# Patient Record
Sex: MALE | Race: WHITE | NOT HISPANIC OR LATINO | URBAN - METROPOLITAN AREA
[De-identification: names, ages, dates, MRNs, and addresses within clinical notes are randomized per-mention and may not be internally consistent; named-entity substitution may affect disease eponyms.]

---

## 2017-01-03 ENCOUNTER — AMBULATORY - HEALTHEAST (OUTPATIENT)
Dept: CARDIOLOGY | Facility: CLINIC | Age: 82
End: 2017-01-03

## 2017-01-03 DIAGNOSIS — Z95.0 PACEMAKER: ICD-10-CM

## 2017-01-05 ENCOUNTER — AMBULATORY - HEALTHEAST (OUTPATIENT)
Dept: NURSING | Facility: CLINIC | Age: 82
End: 2017-01-05

## 2017-01-13 ENCOUNTER — COMMUNICATION - HEALTHEAST (OUTPATIENT)
Dept: NURSING | Facility: CLINIC | Age: 82
End: 2017-01-13

## 2017-01-13 ENCOUNTER — AMBULATORY - HEALTHEAST (OUTPATIENT)
Dept: LAB | Facility: CLINIC | Age: 82
End: 2017-01-13

## 2017-01-13 DIAGNOSIS — I48.92 ATRIAL FLUTTER, UNSPECIFIED TYPE (H): ICD-10-CM

## 2017-01-13 DIAGNOSIS — I48.0 PAROXYSMAL ATRIAL FIBRILLATION (H): ICD-10-CM

## 2017-01-30 ENCOUNTER — OFFICE VISIT - HEALTHEAST (OUTPATIENT)
Dept: FAMILY MEDICINE | Facility: CLINIC | Age: 82
End: 2017-01-30

## 2017-01-30 DIAGNOSIS — J06.9 URI (UPPER RESPIRATORY INFECTION): ICD-10-CM

## 2017-02-08 ENCOUNTER — COMMUNICATION - HEALTHEAST (OUTPATIENT)
Dept: FAMILY MEDICINE | Facility: CLINIC | Age: 82
End: 2017-02-08

## 2017-02-08 DIAGNOSIS — G47.00 INSOMNIA: ICD-10-CM

## 2017-03-08 ENCOUNTER — COMMUNICATION - HEALTHEAST (OUTPATIENT)
Dept: FAMILY MEDICINE | Facility: CLINIC | Age: 82
End: 2017-03-08

## 2017-03-08 DIAGNOSIS — R52 PAIN: ICD-10-CM

## 2017-03-27 ENCOUNTER — COMMUNICATION - HEALTHEAST (OUTPATIENT)
Dept: FAMILY MEDICINE | Facility: CLINIC | Age: 82
End: 2017-03-27

## 2017-03-27 DIAGNOSIS — G47.00 INSOMNIA: ICD-10-CM

## 2017-04-12 ENCOUNTER — COMMUNICATION - HEALTHEAST (OUTPATIENT)
Dept: FAMILY MEDICINE | Facility: CLINIC | Age: 82
End: 2017-04-12

## 2017-04-12 DIAGNOSIS — I48.0 PAROXYSMAL ATRIAL FIBRILLATION (H): ICD-10-CM

## 2017-05-08 ENCOUNTER — AMBULATORY - HEALTHEAST (OUTPATIENT)
Dept: CARDIOLOGY | Facility: CLINIC | Age: 82
End: 2017-05-08

## 2017-05-08 DIAGNOSIS — Z95.0 CARDIAC PACEMAKER IN SITU: ICD-10-CM

## 2017-05-10 LAB — HCC DEVICE COMMENTS: NORMAL

## 2017-05-18 ENCOUNTER — OFFICE VISIT - HEALTHEAST (OUTPATIENT)
Dept: FAMILY MEDICINE | Facility: CLINIC | Age: 82
End: 2017-05-18

## 2017-05-18 DIAGNOSIS — R05.9 COUGH: ICD-10-CM

## 2017-05-18 ASSESSMENT — MIFFLIN-ST. JEOR: SCORE: 1427.89

## 2017-06-02 ENCOUNTER — OFFICE VISIT - HEALTHEAST (OUTPATIENT)
Dept: FAMILY MEDICINE | Facility: CLINIC | Age: 82
End: 2017-06-02

## 2017-06-02 DIAGNOSIS — R47.1 DYSARTHRIA: ICD-10-CM

## 2017-06-02 DIAGNOSIS — K21.00 REFLUX ESOPHAGITIS: ICD-10-CM

## 2017-06-02 DIAGNOSIS — Z00.00 ROUTINE GENERAL MEDICAL EXAMINATION AT A HEALTH CARE FACILITY: ICD-10-CM

## 2017-06-02 DIAGNOSIS — I50.22 CHRONIC SYSTOLIC CONGESTIVE HEART FAILURE (H): ICD-10-CM

## 2017-06-02 DIAGNOSIS — I25.10 CORONARY ATHEROSCLEROSIS: ICD-10-CM

## 2017-06-02 DIAGNOSIS — F33.9 MAJOR DEPRESSIVE DISORDER, RECURRENT EPISODE (H): ICD-10-CM

## 2017-06-02 ASSESSMENT — MIFFLIN-ST. JEOR: SCORE: 1401.57

## 2017-06-05 ENCOUNTER — COMMUNICATION - HEALTHEAST (OUTPATIENT)
Dept: FAMILY MEDICINE | Facility: CLINIC | Age: 82
End: 2017-06-05

## 2017-06-07 ENCOUNTER — COMMUNICATION - HEALTHEAST (OUTPATIENT)
Dept: NURSING | Facility: CLINIC | Age: 82
End: 2017-06-07

## 2017-06-07 ENCOUNTER — AMBULATORY - HEALTHEAST (OUTPATIENT)
Dept: LAB | Facility: CLINIC | Age: 82
End: 2017-06-07

## 2017-06-07 DIAGNOSIS — I48.0 PAROXYSMAL ATRIAL FIBRILLATION (H): ICD-10-CM

## 2017-06-07 DIAGNOSIS — I48.92 ATRIAL FLUTTER, UNSPECIFIED TYPE (H): ICD-10-CM

## 2017-07-06 ENCOUNTER — AMBULATORY - HEALTHEAST (OUTPATIENT)
Dept: LAB | Facility: CLINIC | Age: 82
End: 2017-07-06

## 2017-07-06 ENCOUNTER — COMMUNICATION - HEALTHEAST (OUTPATIENT)
Dept: NURSING | Facility: CLINIC | Age: 82
End: 2017-07-06

## 2017-07-06 DIAGNOSIS — I48.0 PAROXYSMAL ATRIAL FIBRILLATION (H): ICD-10-CM

## 2017-07-06 DIAGNOSIS — I48.92 ATRIAL FLUTTER, UNSPECIFIED TYPE (H): ICD-10-CM

## 2017-07-31 ENCOUNTER — AMBULATORY - HEALTHEAST (OUTPATIENT)
Dept: LAB | Facility: CLINIC | Age: 82
End: 2017-07-31

## 2017-07-31 ENCOUNTER — COMMUNICATION - HEALTHEAST (OUTPATIENT)
Dept: INTERNAL MEDICINE | Facility: CLINIC | Age: 82
End: 2017-07-31

## 2017-07-31 DIAGNOSIS — I48.0 PAROXYSMAL ATRIAL FIBRILLATION (H): ICD-10-CM

## 2017-07-31 DIAGNOSIS — I48.92 ATRIAL FLUTTER, UNSPECIFIED TYPE (H): ICD-10-CM

## 2017-08-10 ENCOUNTER — COMMUNICATION - HEALTHEAST (OUTPATIENT)
Dept: FAMILY MEDICINE | Facility: CLINIC | Age: 82
End: 2017-08-10

## 2017-08-10 DIAGNOSIS — I48.0 PAROXYSMAL ATRIAL FIBRILLATION (H): ICD-10-CM

## 2017-08-15 ENCOUNTER — AMBULATORY - HEALTHEAST (OUTPATIENT)
Dept: CARDIOLOGY | Facility: CLINIC | Age: 82
End: 2017-08-15

## 2017-08-15 DIAGNOSIS — Z95.0 PACEMAKER: ICD-10-CM

## 2017-08-15 LAB — HCC DEVICE COMMENTS: NORMAL

## 2017-08-31 ENCOUNTER — COMMUNICATION - HEALTHEAST (OUTPATIENT)
Dept: FAMILY MEDICINE | Facility: CLINIC | Age: 82
End: 2017-08-31

## 2017-08-31 ENCOUNTER — AMBULATORY - HEALTHEAST (OUTPATIENT)
Dept: LAB | Facility: CLINIC | Age: 82
End: 2017-08-31

## 2017-08-31 DIAGNOSIS — I48.0 PAROXYSMAL ATRIAL FIBRILLATION (H): ICD-10-CM

## 2017-08-31 DIAGNOSIS — I48.92 ATRIAL FLUTTER, UNSPECIFIED TYPE (H): ICD-10-CM

## 2017-09-08 ENCOUNTER — COMMUNICATION - HEALTHEAST (OUTPATIENT)
Dept: FAMILY MEDICINE | Facility: CLINIC | Age: 82
End: 2017-09-08

## 2017-09-08 DIAGNOSIS — G47.00 INSOMNIA: ICD-10-CM

## 2017-09-18 ENCOUNTER — COMMUNICATION - HEALTHEAST (OUTPATIENT)
Dept: NURSING | Facility: CLINIC | Age: 82
End: 2017-09-18

## 2017-09-18 DIAGNOSIS — I48.91 ATRIAL FIBRILLATION (H): ICD-10-CM

## 2017-09-19 ENCOUNTER — COMMUNICATION - HEALTHEAST (OUTPATIENT)
Dept: FAMILY MEDICINE | Facility: CLINIC | Age: 82
End: 2017-09-19

## 2017-09-21 ENCOUNTER — COMMUNICATION - HEALTHEAST (OUTPATIENT)
Dept: NURSING | Facility: CLINIC | Age: 82
End: 2017-09-21

## 2017-09-21 ENCOUNTER — OFFICE VISIT - HEALTHEAST (OUTPATIENT)
Dept: FAMILY MEDICINE | Facility: CLINIC | Age: 82
End: 2017-09-21

## 2017-09-21 DIAGNOSIS — R20.9 BILATERAL COLD FEET: ICD-10-CM

## 2017-09-21 DIAGNOSIS — I25.10 CORONARY ARTERY DISEASE: ICD-10-CM

## 2017-09-21 DIAGNOSIS — R05.9 COUGH: ICD-10-CM

## 2017-09-21 DIAGNOSIS — I48.91 ATRIAL FIBRILLATION (H): ICD-10-CM

## 2017-09-21 LAB — LDLC SERPL CALC-MCNC: 90 MG/DL

## 2017-09-21 ASSESSMENT — MIFFLIN-ST. JEOR: SCORE: 1407.47

## 2017-09-22 ENCOUNTER — COMMUNICATION - HEALTHEAST (OUTPATIENT)
Dept: FAMILY MEDICINE | Facility: CLINIC | Age: 82
End: 2017-09-22

## 2017-09-28 ENCOUNTER — COMMUNICATION - HEALTHEAST (OUTPATIENT)
Dept: FAMILY MEDICINE | Facility: CLINIC | Age: 82
End: 2017-09-28

## 2017-10-12 ENCOUNTER — AMBULATORY - HEALTHEAST (OUTPATIENT)
Dept: CARDIOLOGY | Facility: CLINIC | Age: 82
End: 2017-10-12

## 2017-10-12 ENCOUNTER — OFFICE VISIT - HEALTHEAST (OUTPATIENT)
Dept: CARDIOLOGY | Facility: CLINIC | Age: 82
End: 2017-10-12

## 2017-10-12 DIAGNOSIS — I48.0 PAROXYSMAL ATRIAL FIBRILLATION (H): ICD-10-CM

## 2017-10-12 DIAGNOSIS — I49.5 SICK SINUS SYNDROME WITH TACHYCARDIA (H): ICD-10-CM

## 2017-10-12 DIAGNOSIS — Z95.0 CARDIAC PACEMAKER IN SITU: ICD-10-CM

## 2017-10-12 DIAGNOSIS — I25.10 CORONARY ATHEROSCLEROSIS: ICD-10-CM

## 2017-10-12 DIAGNOSIS — I10 ESSENTIAL HYPERTENSION: ICD-10-CM

## 2017-10-12 DIAGNOSIS — I50.22 CHRONIC SYSTOLIC CONGESTIVE HEART FAILURE (H): ICD-10-CM

## 2017-10-12 LAB — HCC DEVICE COMMENTS: NORMAL

## 2017-10-12 ASSESSMENT — MIFFLIN-ST. JEOR: SCORE: 1431.28

## 2017-10-18 ENCOUNTER — RECORDS - HEALTHEAST (OUTPATIENT)
Dept: ADMINISTRATIVE | Facility: OTHER | Age: 82
End: 2017-10-18

## 2017-10-18 ENCOUNTER — AMBULATORY - HEALTHEAST (OUTPATIENT)
Dept: CARDIOLOGY | Facility: CLINIC | Age: 82
End: 2017-10-18

## 2017-10-26 ENCOUNTER — OFFICE VISIT - HEALTHEAST (OUTPATIENT)
Dept: FAMILY MEDICINE | Facility: CLINIC | Age: 82
End: 2017-10-26

## 2017-10-26 ENCOUNTER — COMMUNICATION - HEALTHEAST (OUTPATIENT)
Dept: FAMILY MEDICINE | Facility: CLINIC | Age: 82
End: 2017-10-26

## 2017-10-26 ENCOUNTER — COMMUNICATION - HEALTHEAST (OUTPATIENT)
Dept: CARDIOLOGY | Facility: CLINIC | Age: 82
End: 2017-10-26

## 2017-10-26 ENCOUNTER — COMMUNICATION - HEALTHEAST (OUTPATIENT)
Dept: INTERNAL MEDICINE | Facility: CLINIC | Age: 82
End: 2017-10-26

## 2017-10-26 DIAGNOSIS — I48.91 A-FIB (H): ICD-10-CM

## 2017-10-26 DIAGNOSIS — I48.91 ATRIAL FIBRILLATION (H): ICD-10-CM

## 2017-10-26 ASSESSMENT — MIFFLIN-ST. JEOR: SCORE: 1431.28

## 2018-01-16 ENCOUNTER — AMBULATORY - HEALTHEAST (OUTPATIENT)
Dept: CARDIOLOGY | Facility: CLINIC | Age: 83
End: 2018-01-16

## 2018-01-16 DIAGNOSIS — Z95.0 CARDIAC PACEMAKER IN SITU: ICD-10-CM

## 2018-01-16 LAB — HCC DEVICE COMMENTS: NORMAL

## 2018-03-01 ENCOUNTER — COMMUNICATION - HEALTHEAST (OUTPATIENT)
Dept: FAMILY MEDICINE | Facility: CLINIC | Age: 83
End: 2018-03-01

## 2018-03-01 DIAGNOSIS — G47.00 INSOMNIA: ICD-10-CM

## 2018-04-10 ENCOUNTER — COMMUNICATION - HEALTHEAST (OUTPATIENT)
Dept: FAMILY MEDICINE | Facility: CLINIC | Age: 83
End: 2018-04-10

## 2018-04-12 ENCOUNTER — COMMUNICATION - HEALTHEAST (OUTPATIENT)
Dept: FAMILY MEDICINE | Facility: CLINIC | Age: 83
End: 2018-04-12

## 2018-04-12 DIAGNOSIS — R52 PAIN: ICD-10-CM

## 2018-04-19 ENCOUNTER — COMMUNICATION - HEALTHEAST (OUTPATIENT)
Dept: CARDIOLOGY | Facility: CLINIC | Age: 83
End: 2018-04-19

## 2018-04-19 DIAGNOSIS — I48.91 A-FIB (H): ICD-10-CM

## 2018-05-01 ENCOUNTER — AMBULATORY - HEALTHEAST (OUTPATIENT)
Dept: CARDIOLOGY | Facility: CLINIC | Age: 83
End: 2018-05-01

## 2018-05-01 DIAGNOSIS — Z95.0 CARDIAC PACEMAKER IN SITU: ICD-10-CM

## 2018-05-01 LAB
HCC DEVICE COMMENTS: NORMAL
HCC DEVICE IMPLANTING PROVIDER: NORMAL
HCC DEVICE MANUFACTURE: NORMAL
HCC DEVICE MODEL: NORMAL
HCC DEVICE SERIAL NUMBER: NORMAL
HCC DEVICE TYPE: NORMAL

## 2018-05-01 ASSESSMENT — MIFFLIN-ST. JEOR: SCORE: 1444.89

## 2018-05-25 ENCOUNTER — COMMUNICATION - HEALTHEAST (OUTPATIENT)
Dept: FAMILY MEDICINE | Facility: CLINIC | Age: 83
End: 2018-05-25

## 2018-05-25 DIAGNOSIS — G47.00 INSOMNIA: ICD-10-CM

## 2018-06-07 ENCOUNTER — RECORDS - HEALTHEAST (OUTPATIENT)
Dept: ADMINISTRATIVE | Facility: OTHER | Age: 83
End: 2018-06-07

## 2018-06-11 ENCOUNTER — OFFICE VISIT - HEALTHEAST (OUTPATIENT)
Dept: FAMILY MEDICINE | Facility: CLINIC | Age: 83
End: 2018-06-11

## 2018-06-11 DIAGNOSIS — R47.1 DYSARTHRIA: ICD-10-CM

## 2018-06-11 DIAGNOSIS — I25.5 ISCHEMIC CARDIOMYOPATHY: ICD-10-CM

## 2018-06-11 DIAGNOSIS — Z95.0 CARDIAC PACEMAKER IN SITU: ICD-10-CM

## 2018-06-11 DIAGNOSIS — Z00.00 MEDICARE ANNUAL WELLNESS VISIT, SUBSEQUENT: ICD-10-CM

## 2018-06-11 DIAGNOSIS — I25.10 CORONARY ATHEROSCLEROSIS: ICD-10-CM

## 2018-06-11 DIAGNOSIS — R52 PAIN: ICD-10-CM

## 2018-06-11 DIAGNOSIS — I48.92 ATRIAL FLUTTER, UNSPECIFIED TYPE (H): ICD-10-CM

## 2018-06-11 DIAGNOSIS — G62.9 NEUROPATHY: ICD-10-CM

## 2018-06-11 DIAGNOSIS — I10 ESSENTIAL HYPERTENSION: ICD-10-CM

## 2018-06-11 ASSESSMENT — MIFFLIN-ST. JEOR: SCORE: 1412.01

## 2018-06-15 ENCOUNTER — COMMUNICATION - HEALTHEAST (OUTPATIENT)
Dept: FAMILY MEDICINE | Facility: CLINIC | Age: 83
End: 2018-06-15

## 2018-06-22 ENCOUNTER — AMBULATORY - HEALTHEAST (OUTPATIENT)
Dept: CARDIOLOGY | Facility: CLINIC | Age: 83
End: 2018-06-22

## 2018-06-25 ENCOUNTER — OFFICE VISIT - HEALTHEAST (OUTPATIENT)
Dept: CARDIOLOGY | Facility: CLINIC | Age: 83
End: 2018-06-25

## 2018-06-25 DIAGNOSIS — Z95.0 CARDIAC PACEMAKER IN SITU: ICD-10-CM

## 2018-06-25 DIAGNOSIS — I10 ESSENTIAL HYPERTENSION: ICD-10-CM

## 2018-06-25 DIAGNOSIS — I25.5 ISCHEMIC CARDIOMYOPATHY: ICD-10-CM

## 2018-06-25 DIAGNOSIS — I48.92 ATRIAL FLUTTER (H): ICD-10-CM

## 2018-06-25 DIAGNOSIS — I50.22 CHRONIC SYSTOLIC CONGESTIVE HEART FAILURE (H): ICD-10-CM

## 2018-06-25 LAB
ANION GAP SERPL CALCULATED.3IONS-SCNC: 7 MMOL/L (ref 5–18)
BNP SERPL-MCNC: 196 PG/ML (ref 0–93)
BUN SERPL-MCNC: 20 MG/DL (ref 8–28)
CALCIUM SERPL-MCNC: 9.2 MG/DL (ref 8.5–10.5)
CHLORIDE BLD-SCNC: 95 MMOL/L (ref 98–107)
CO2 SERPL-SCNC: 31 MMOL/L (ref 22–31)
CREAT SERPL-MCNC: 0.85 MG/DL (ref 0.7–1.3)
GFR SERPL CREATININE-BSD FRML MDRD: >60 ML/MIN/1.73M2
GLUCOSE BLD-MCNC: 103 MG/DL (ref 70–125)
POTASSIUM BLD-SCNC: 4.4 MMOL/L (ref 3.5–5)
SODIUM SERPL-SCNC: 133 MMOL/L (ref 136–145)

## 2018-06-25 ASSESSMENT — MIFFLIN-ST. JEOR: SCORE: 1402.94

## 2018-07-09 ENCOUNTER — AMBULATORY - HEALTHEAST (OUTPATIENT)
Dept: CARDIOLOGY | Facility: CLINIC | Age: 83
End: 2018-07-09

## 2018-07-09 DIAGNOSIS — I50.23 ACUTE ON CHRONIC SYSTOLIC CONGESTIVE HEART FAILURE (H): ICD-10-CM

## 2018-07-25 ENCOUNTER — AMBULATORY - HEALTHEAST (OUTPATIENT)
Dept: NURSING | Facility: CLINIC | Age: 83
End: 2018-07-25

## 2018-07-25 DIAGNOSIS — Z23 IMMUNIZATION DUE: ICD-10-CM

## 2018-07-30 ENCOUNTER — AMBULATORY - HEALTHEAST (OUTPATIENT)
Dept: CARDIOLOGY | Facility: CLINIC | Age: 83
End: 2018-07-30

## 2018-07-30 DIAGNOSIS — I50.23 ACUTE ON CHRONIC SYSTOLIC CONGESTIVE HEART FAILURE (H): ICD-10-CM

## 2018-07-30 LAB
ANION GAP SERPL CALCULATED.3IONS-SCNC: 6 MMOL/L (ref 5–18)
BUN SERPL-MCNC: 17 MG/DL (ref 8–28)
CALCIUM SERPL-MCNC: 9 MG/DL (ref 8.5–10.5)
CHLORIDE BLD-SCNC: 95 MMOL/L (ref 98–107)
CO2 SERPL-SCNC: 32 MMOL/L (ref 22–31)
CREAT SERPL-MCNC: 0.84 MG/DL (ref 0.7–1.3)
GFR SERPL CREATININE-BSD FRML MDRD: >60 ML/MIN/1.73M2
GLUCOSE BLD-MCNC: 95 MG/DL (ref 70–125)
POTASSIUM BLD-SCNC: 4.4 MMOL/L (ref 3.5–5)
SODIUM SERPL-SCNC: 133 MMOL/L (ref 136–145)

## 2018-08-01 ENCOUNTER — AMBULATORY - HEALTHEAST (OUTPATIENT)
Dept: CARDIOLOGY | Facility: CLINIC | Age: 83
End: 2018-08-01

## 2018-08-01 ENCOUNTER — COMMUNICATION - HEALTHEAST (OUTPATIENT)
Dept: CARDIOLOGY | Facility: CLINIC | Age: 83
End: 2018-08-01

## 2018-08-01 DIAGNOSIS — Z95.0 CARDIAC PACEMAKER IN SITU: ICD-10-CM

## 2018-08-08 ENCOUNTER — COMMUNICATION - HEALTHEAST (OUTPATIENT)
Dept: CARDIOLOGY | Facility: CLINIC | Age: 83
End: 2018-08-08

## 2018-08-08 DIAGNOSIS — I10 HYPERTENSION: ICD-10-CM

## 2018-08-13 ENCOUNTER — COMMUNICATION - HEALTHEAST (OUTPATIENT)
Dept: CARDIOLOGY | Facility: CLINIC | Age: 83
End: 2018-08-13

## 2018-10-04 ENCOUNTER — AMBULATORY - HEALTHEAST (OUTPATIENT)
Dept: CARDIOLOGY | Facility: CLINIC | Age: 83
End: 2018-10-04

## 2018-10-04 DIAGNOSIS — Z95.0 CARDIAC PACEMAKER IN SITU: ICD-10-CM

## 2018-10-04 ASSESSMENT — MIFFLIN-ST. JEOR: SCORE: 1436.96

## 2018-10-10 ENCOUNTER — COMMUNICATION - HEALTHEAST (OUTPATIENT)
Dept: CARDIOLOGY | Facility: CLINIC | Age: 83
End: 2018-10-10

## 2018-10-10 DIAGNOSIS — I48.91 A-FIB (H): ICD-10-CM

## 2018-10-18 ENCOUNTER — OFFICE VISIT - HEALTHEAST (OUTPATIENT)
Dept: CARDIOLOGY | Facility: CLINIC | Age: 83
End: 2018-10-18

## 2018-10-18 DIAGNOSIS — I10 ESSENTIAL HYPERTENSION: ICD-10-CM

## 2018-10-18 DIAGNOSIS — I48.19 PERSISTENT ATRIAL FIBRILLATION (H): ICD-10-CM

## 2018-10-18 DIAGNOSIS — I25.10 ATHEROSCLEROSIS OF NATIVE CORONARY ARTERY OF NATIVE HEART WITHOUT ANGINA PECTORIS: ICD-10-CM

## 2018-10-18 ASSESSMENT — MIFFLIN-ST. JEOR: SCORE: 1446.03

## 2018-11-14 ENCOUNTER — COMMUNICATION - HEALTHEAST (OUTPATIENT)
Dept: FAMILY MEDICINE | Facility: CLINIC | Age: 83
End: 2018-11-14

## 2018-11-14 DIAGNOSIS — G47.00 INSOMNIA: ICD-10-CM

## 2019-01-02 ENCOUNTER — COMMUNICATION - HEALTHEAST (OUTPATIENT)
Dept: FAMILY MEDICINE | Facility: CLINIC | Age: 84
End: 2019-01-02

## 2019-01-02 DIAGNOSIS — R52 PAIN: ICD-10-CM

## 2019-01-07 ENCOUNTER — AMBULATORY - HEALTHEAST (OUTPATIENT)
Dept: CARDIOLOGY | Facility: CLINIC | Age: 84
End: 2019-01-07

## 2019-01-07 DIAGNOSIS — Z95.0 CARDIAC PACEMAKER IN SITU: ICD-10-CM

## 2019-03-06 ENCOUNTER — COMMUNICATION - HEALTHEAST (OUTPATIENT)
Dept: CARDIOLOGY | Facility: CLINIC | Age: 84
End: 2019-03-06

## 2019-03-06 DIAGNOSIS — I48.91 A-FIB (H): ICD-10-CM

## 2019-03-14 ENCOUNTER — COMMUNICATION - HEALTHEAST (OUTPATIENT)
Dept: TELEHEALTH | Facility: CLINIC | Age: 84
End: 2019-03-14

## 2019-04-03 ENCOUNTER — COMMUNICATION - HEALTHEAST (OUTPATIENT)
Dept: FAMILY MEDICINE | Facility: CLINIC | Age: 84
End: 2019-04-03

## 2019-04-03 DIAGNOSIS — R52 PAIN: ICD-10-CM

## 2019-04-10 ENCOUNTER — AMBULATORY - HEALTHEAST (OUTPATIENT)
Dept: CARDIOLOGY | Facility: CLINIC | Age: 84
End: 2019-04-10

## 2019-04-10 DIAGNOSIS — Z95.0 CARDIAC PACEMAKER IN SITU: ICD-10-CM

## 2019-05-07 ENCOUNTER — OFFICE VISIT - HEALTHEAST (OUTPATIENT)
Dept: CARDIOLOGY | Facility: CLINIC | Age: 84
End: 2019-05-07

## 2019-05-07 DIAGNOSIS — I48.19 PERSISTENT ATRIAL FIBRILLATION (H): ICD-10-CM

## 2019-05-07 DIAGNOSIS — I25.10 ATHEROSCLEROSIS OF NATIVE CORONARY ARTERY OF NATIVE HEART WITHOUT ANGINA PECTORIS: ICD-10-CM

## 2019-05-07 DIAGNOSIS — I10 ESSENTIAL HYPERTENSION: ICD-10-CM

## 2019-05-07 DIAGNOSIS — I49.5 SICK SINUS SYNDROME WITH TACHYCARDIA (H): ICD-10-CM

## 2019-05-07 DIAGNOSIS — Z95.0 CARDIAC PACEMAKER IN SITU: ICD-10-CM

## 2019-05-07 DIAGNOSIS — I25.10 CAD (CORONARY ARTERY DISEASE): ICD-10-CM

## 2019-05-07 ASSESSMENT — MIFFLIN-ST. JEOR: SCORE: 1433.61

## 2019-05-16 ENCOUNTER — COMMUNICATION - HEALTHEAST (OUTPATIENT)
Dept: FAMILY MEDICINE | Facility: CLINIC | Age: 84
End: 2019-05-16

## 2019-05-16 DIAGNOSIS — G47.00 INSOMNIA: ICD-10-CM

## 2019-06-14 ENCOUNTER — OFFICE VISIT - HEALTHEAST (OUTPATIENT)
Dept: FAMILY MEDICINE | Facility: CLINIC | Age: 84
End: 2019-06-14

## 2019-06-14 DIAGNOSIS — I49.5 SICK SINUS SYNDROME WITH TACHYCARDIA (H): ICD-10-CM

## 2019-06-14 DIAGNOSIS — Z00.00 MEDICARE ANNUAL WELLNESS VISIT, SUBSEQUENT: ICD-10-CM

## 2019-06-14 DIAGNOSIS — I10 ESSENTIAL HYPERTENSION: ICD-10-CM

## 2019-06-14 DIAGNOSIS — R47.1 DYSARTHRIA: ICD-10-CM

## 2019-06-14 DIAGNOSIS — I25.10 ATHEROSCLEROSIS OF NATIVE CORONARY ARTERY OF NATIVE HEART WITHOUT ANGINA PECTORIS: ICD-10-CM

## 2019-06-14 DIAGNOSIS — I48.19 PERSISTENT ATRIAL FIBRILLATION (H): ICD-10-CM

## 2019-06-14 LAB
ALBUMIN SERPL-MCNC: 3.2 G/DL (ref 3.5–5)
ALP SERPL-CCNC: 119 U/L (ref 45–120)
ALT SERPL W P-5'-P-CCNC: 23 U/L (ref 0–45)
ANION GAP SERPL CALCULATED.3IONS-SCNC: 9 MMOL/L (ref 5–18)
AST SERPL W P-5'-P-CCNC: 36 U/L (ref 0–40)
BILIRUB DIRECT SERPL-MCNC: 0.4 MG/DL
BILIRUB SERPL-MCNC: 0.8 MG/DL (ref 0–1)
BUN SERPL-MCNC: 18 MG/DL (ref 8–28)
CALCIUM SERPL-MCNC: 9.4 MG/DL (ref 8.5–10.5)
CHLORIDE BLD-SCNC: 96 MMOL/L (ref 98–107)
CHOLEST SERPL-MCNC: 143 MG/DL
CO2 SERPL-SCNC: 29 MMOL/L (ref 22–31)
CREAT SERPL-MCNC: 0.86 MG/DL (ref 0.7–1.3)
ERYTHROCYTE [DISTWIDTH] IN BLOOD BY AUTOMATED COUNT: 11.9 % (ref 11–14.5)
FASTING STATUS PATIENT QL REPORTED: YES
GFR SERPL CREATININE-BSD FRML MDRD: >60 ML/MIN/1.73M2
GLUCOSE BLD-MCNC: 76 MG/DL (ref 70–125)
HCT VFR BLD AUTO: 37.4 % (ref 40–54)
HDLC SERPL-MCNC: 45 MG/DL
HGB BLD-MCNC: 12.6 G/DL (ref 14–18)
LDLC SERPL CALC-MCNC: 88 MG/DL
MCH RBC QN AUTO: 31.4 PG (ref 27–34)
MCHC RBC AUTO-ENTMCNC: 33.7 G/DL (ref 32–36)
MCV RBC AUTO: 93 FL (ref 80–100)
PLATELET # BLD AUTO: 149 THOU/UL (ref 140–440)
PMV BLD AUTO: 6.9 FL (ref 7–10)
POTASSIUM BLD-SCNC: 4.1 MMOL/L (ref 3.5–5)
PROT SERPL-MCNC: 7.4 G/DL (ref 6–8)
RBC # BLD AUTO: 4.02 MILL/UL (ref 4.4–6.2)
SODIUM SERPL-SCNC: 134 MMOL/L (ref 136–145)
TRIGL SERPL-MCNC: 52 MG/DL
WBC: 5.5 THOU/UL (ref 4–11)

## 2019-06-14 ASSESSMENT — MIFFLIN-ST. JEOR: SCORE: 1435.03

## 2019-06-19 ENCOUNTER — COMMUNICATION - HEALTHEAST (OUTPATIENT)
Dept: FAMILY MEDICINE | Facility: CLINIC | Age: 84
End: 2019-06-19

## 2019-06-26 ENCOUNTER — COMMUNICATION - HEALTHEAST (OUTPATIENT)
Dept: FAMILY MEDICINE | Facility: CLINIC | Age: 84
End: 2019-06-26

## 2019-06-26 DIAGNOSIS — R52 PAIN: ICD-10-CM

## 2019-06-27 ENCOUNTER — COMMUNICATION - HEALTHEAST (OUTPATIENT)
Dept: FAMILY MEDICINE | Facility: CLINIC | Age: 84
End: 2019-06-27

## 2019-06-27 DIAGNOSIS — R52 PAIN: ICD-10-CM

## 2019-07-15 ENCOUNTER — AMBULATORY - HEALTHEAST (OUTPATIENT)
Dept: CARDIOLOGY | Facility: CLINIC | Age: 84
End: 2019-07-15

## 2019-07-15 DIAGNOSIS — Z95.0 CARDIAC PACEMAKER IN SITU: ICD-10-CM

## 2019-07-16 ENCOUNTER — COMMUNICATION - HEALTHEAST (OUTPATIENT)
Dept: CARDIOLOGY | Facility: CLINIC | Age: 84
End: 2019-07-16

## 2019-07-26 ENCOUNTER — OFFICE VISIT - HEALTHEAST (OUTPATIENT)
Dept: FAMILY MEDICINE | Facility: CLINIC | Age: 84
End: 2019-07-26

## 2019-07-26 ENCOUNTER — RECORDS - HEALTHEAST (OUTPATIENT)
Dept: GENERAL RADIOLOGY | Facility: CLINIC | Age: 84
End: 2019-07-26

## 2019-07-26 DIAGNOSIS — M25.562 ACUTE PAIN OF LEFT KNEE: ICD-10-CM

## 2019-07-26 DIAGNOSIS — M25.562 PAIN IN LEFT KNEE: ICD-10-CM

## 2019-07-31 ENCOUNTER — COMMUNICATION - HEALTHEAST (OUTPATIENT)
Dept: CARDIOLOGY | Facility: CLINIC | Age: 84
End: 2019-07-31

## 2019-07-31 DIAGNOSIS — I10 HYPERTENSION: ICD-10-CM

## 2019-08-08 ENCOUNTER — COMMUNICATION - HEALTHEAST (OUTPATIENT)
Dept: FAMILY MEDICINE | Facility: CLINIC | Age: 84
End: 2019-08-08

## 2019-08-08 DIAGNOSIS — G47.00 INSOMNIA: ICD-10-CM

## 2019-09-03 ENCOUNTER — COMMUNICATION - HEALTHEAST (OUTPATIENT)
Dept: FAMILY MEDICINE | Facility: CLINIC | Age: 84
End: 2019-09-03

## 2019-09-05 ENCOUNTER — COMMUNICATION - HEALTHEAST (OUTPATIENT)
Dept: FAMILY MEDICINE | Facility: CLINIC | Age: 84
End: 2019-09-05

## 2019-09-11 ENCOUNTER — AMBULATORY - HEALTHEAST (OUTPATIENT)
Dept: CARDIOLOGY | Facility: CLINIC | Age: 84
End: 2019-09-11

## 2019-09-11 DIAGNOSIS — I50.22 CHRONIC SYSTOLIC HEART FAILURE (H): ICD-10-CM

## 2019-09-11 DIAGNOSIS — I48.19 PERSISTENT ATRIAL FIBRILLATION (H): ICD-10-CM

## 2019-09-11 DIAGNOSIS — I25.10 CAD (CORONARY ARTERY DISEASE): ICD-10-CM

## 2019-09-11 DIAGNOSIS — I49.5 SICK SINUS SYNDROME WITH TACHYCARDIA (H): ICD-10-CM

## 2019-09-11 DIAGNOSIS — Z95.0 CARDIAC PACEMAKER IN SITU: ICD-10-CM

## 2019-09-11 DIAGNOSIS — I25.10 ATHEROSCLEROSIS OF NATIVE CORONARY ARTERY OF NATIVE HEART WITHOUT ANGINA PECTORIS: ICD-10-CM

## 2019-09-11 DIAGNOSIS — I10 ESSENTIAL HYPERTENSION: ICD-10-CM

## 2019-09-11 DIAGNOSIS — I48.4 ATYPICAL ATRIAL FLUTTER (H): ICD-10-CM

## 2019-09-11 DIAGNOSIS — I25.5 ISCHEMIC CARDIOMYOPATHY: ICD-10-CM

## 2019-09-11 LAB
ANION GAP SERPL CALCULATED.3IONS-SCNC: 6 MMOL/L (ref 5–18)
BNP SERPL-MCNC: 1075 PG/ML (ref 0–93)
BUN SERPL-MCNC: 24 MG/DL (ref 8–28)
CALCIUM SERPL-MCNC: 9.1 MG/DL (ref 8.5–10.5)
CHLORIDE BLD-SCNC: 92 MMOL/L (ref 98–107)
CO2 SERPL-SCNC: 29 MMOL/L (ref 22–31)
CREAT SERPL-MCNC: 0.89 MG/DL (ref 0.7–1.3)
GFR SERPL CREATININE-BSD FRML MDRD: >60 ML/MIN/1.73M2
GLUCOSE BLD-MCNC: 84 MG/DL (ref 70–125)
POTASSIUM BLD-SCNC: 5 MMOL/L (ref 3.5–5)
SODIUM SERPL-SCNC: 127 MMOL/L (ref 136–145)

## 2019-09-11 ASSESSMENT — MIFFLIN-ST. JEOR: SCORE: 1460.77

## 2019-09-12 ENCOUNTER — AMBULATORY - HEALTHEAST (OUTPATIENT)
Dept: CARDIOLOGY | Facility: CLINIC | Age: 84
End: 2019-09-12

## 2019-09-12 ENCOUNTER — COMMUNICATION - HEALTHEAST (OUTPATIENT)
Dept: CARDIOLOGY | Facility: CLINIC | Age: 84
End: 2019-09-12

## 2019-09-12 DIAGNOSIS — I25.10 CAD (CORONARY ARTERY DISEASE): ICD-10-CM

## 2019-09-13 ENCOUNTER — HOSPITAL ENCOUNTER (OUTPATIENT)
Dept: CARDIOLOGY | Facility: HOSPITAL | Age: 84
Discharge: HOME | End: 2019-09-13
Attending: INTERNAL MEDICINE

## 2019-09-13 ENCOUNTER — OFFICE VISIT - HEALTHEAST (OUTPATIENT)
Dept: FAMILY MEDICINE | Facility: CLINIC | Age: 84
End: 2019-09-13

## 2019-09-13 DIAGNOSIS — I50.22 CHRONIC SYSTOLIC HEART FAILURE (H): ICD-10-CM

## 2019-09-13 DIAGNOSIS — I25.5 ISCHEMIC CARDIOMYOPATHY: ICD-10-CM

## 2019-09-13 DIAGNOSIS — R09.89 THROAT CLEARING: ICD-10-CM

## 2019-09-13 ASSESSMENT — MIFFLIN-ST. JEOR: SCORE: 1452.83

## 2019-09-16 LAB
AORTIC ROOT: 3.1 CM
AORTIC VALVE MEAN VELOCITY: 95.5 CM/S
AV DIMENSIONLESS INDEX VTI: 0.8
AV MEAN GRADIENT: 4 MMHG
AV PEAK GRADIENT: 6.1 MMHG
AV VALVE AREA: 3 CM2
AV VELOCITY RATIO: 0.8
BSA FOR ECHO PROCEDURE: 1.95 M2
CV BLOOD PRESSURE: ABNORMAL MMHG
CV ECHO HEIGHT: 68.5 IN
CV ECHO WEIGHT: 174 LBS
DOP CALC AO PEAK VEL: 123 CM/S
DOP CALC AO VTI: 24.1 CM
DOP CALC LVOT AREA: 3.8 CM2
DOP CALC LVOT DIAMETER: 2.2 CM
DOP CALC LVOT PEAK VEL: 92.4 CM/S
DOP CALC LVOT STROKE VOLUME: 71.8 CM3
DOP CALCLVOT PEAK VEL VTI: 18.9 CM
EJECTION FRACTION: 51 % (ref 55–75)
FRACTIONAL SHORTENING: 57 % (ref 28–44)
INTERVENTRICULAR SEPTUM IN END DIASTOLE: 0.9 CM (ref 0.6–1)
IVS/PW RATIO: 1
LA AREA 1: 34 CM2
LA AREA 2: 34 CM2
LEFT ATRIUM LENGTH: 6.6 CM
LEFT ATRIUM SIZE: 5.2 CM
LEFT ATRIUM VOLUME INDEX: 76.3 ML/M2
LEFT ATRIUM VOLUME: 148.9 ML
LEFT VENTRICLE CARDIAC INDEX: 2.3 L/MIN/M2
LEFT VENTRICLE CARDIAC OUTPUT: 4.5 L/MIN
LEFT VENTRICLE DIASTOLIC VOLUME INDEX: 61 CM3/M2 (ref 34–74)
LEFT VENTRICLE DIASTOLIC VOLUME: 119 CM3 (ref 62–150)
LEFT VENTRICLE HEART RATE: 63 BPM
LEFT VENTRICLE MASS INDEX: 81.1 G/M2
LEFT VENTRICLE SYSTOLIC VOLUME INDEX: 30 CM3/M2 (ref 11–31)
LEFT VENTRICLE SYSTOLIC VOLUME: 58.5 CM3 (ref 21–61)
LEFT VENTRICULAR INTERNAL DIMENSION IN DIASTOLE: 5 CM (ref 4.2–5.8)
LEFT VENTRICULAR INTERNAL DIMENSION IN SYSTOLE: 2.15 CM (ref 2.5–4)
LEFT VENTRICULAR MASS: 158.2 G
LEFT VENTRICULAR OUTFLOW TRACT MEAN GRADIENT: 2 MMHG
LEFT VENTRICULAR OUTFLOW TRACT MEAN VELOCITY: 72.1 CM/S
LEFT VENTRICULAR OUTFLOW TRACT PEAK GRADIENT: 3 MMHG
LEFT VENTRICULAR POSTERIOR WALL IN END DIASTOLE: 0.9 CM (ref 0.6–1)
LV STROKE VOLUME INDEX: 36.8 ML/M2
MITRAL REGURGITANT VELOCITY TIME INTEGRAL: 137 CM
MR FLOW: 21 CM3
MR MEAN GRADIENT: 59 MMHG
MR MEAN VELOCITY: 410 CM/S
MR PEAK GRADIENT: 80.6 MMHG
MR PISA EROA: 0.2 CM2
MR PISA RADIUS: 0.6 CM
MR PISA VN NYQUIST: 30.7 CM/S
MV AVERAGE E/E' RATIO: 5.9 CM/S
MV DECELERATION TIME: 278 MS
MV E'TISSUE VEL-LAT: 15.3 CM/S
MV E'TISSUE VEL-MED: 5.22 CM/S
MV LATERAL E/E' RATIO: 3.9
MV MEDIAL E/E' RATIO: 11.5
MV PEAK E VELOCITY: 60.1 CM/S
MV REGURGITANT VOLUME: 21.2 CC
NUC REST DIASTOLIC VOLUME INDEX: 2788 LBS
NUC REST SYSTOLIC VOLUME INDEX: 68.5 IN
PISA MR PEAK VEL: 449 CM/S
TRICUSPID REGURGITATION PEAK PRESSURE GRADIENT: 37.9 MMHG
TRICUSPID VALVE PEAK REGURGITANT VELOCITY: 308 CM/S

## 2019-09-19 ENCOUNTER — AMBULATORY - HEALTHEAST (OUTPATIENT)
Dept: CARDIOLOGY | Facility: CLINIC | Age: 84
End: 2019-09-19

## 2019-09-19 ENCOUNTER — OFFICE VISIT - HEALTHEAST (OUTPATIENT)
Dept: CARDIOLOGY | Facility: CLINIC | Age: 84
End: 2019-09-19

## 2019-09-19 DIAGNOSIS — Z95.0 CARDIAC PACEMAKER IN SITU: ICD-10-CM

## 2019-09-19 DIAGNOSIS — I48.19 PERSISTENT ATRIAL FIBRILLATION (H): ICD-10-CM

## 2019-09-19 DIAGNOSIS — I50.33 ACUTE ON CHRONIC HEART FAILURE WITH PRESERVED EJECTION FRACTION (H): ICD-10-CM

## 2019-09-19 DIAGNOSIS — I25.5 ISCHEMIC CARDIOMYOPATHY: ICD-10-CM

## 2019-09-19 DIAGNOSIS — R60.9 EDEMA, UNSPECIFIED TYPE: ICD-10-CM

## 2019-09-19 LAB
ANION GAP SERPL CALCULATED.3IONS-SCNC: 6 MMOL/L (ref 5–18)
BUN SERPL-MCNC: 19 MG/DL (ref 8–28)
CALCIUM SERPL-MCNC: 9.4 MG/DL (ref 8.5–10.5)
CHLORIDE BLD-SCNC: 93 MMOL/L (ref 98–107)
CO2 SERPL-SCNC: 32 MMOL/L (ref 22–31)
CREAT SERPL-MCNC: 0.8 MG/DL (ref 0.7–1.3)
GFR SERPL CREATININE-BSD FRML MDRD: >60 ML/MIN/1.73M2
GLUCOSE BLD-MCNC: 87 MG/DL (ref 70–125)
POTASSIUM BLD-SCNC: 4.1 MMOL/L (ref 3.5–5)
SODIUM SERPL-SCNC: 131 MMOL/L (ref 136–145)

## 2019-09-19 ASSESSMENT — MIFFLIN-ST. JEOR: SCORE: 1449.99

## 2019-09-23 ENCOUNTER — HOSPITAL ENCOUNTER (OUTPATIENT)
Dept: INTERVENTIONAL RADIOLOGY/VASCULAR | Facility: HOSPITAL | Age: 84
Discharge: HOME | End: 2019-09-23
Attending: INTERNAL MEDICINE | Admitting: RADIOLOGY

## 2019-09-23 DIAGNOSIS — I25.5 ISCHEMIC CARDIOMYOPATHY: ICD-10-CM

## 2019-09-23 DIAGNOSIS — I48.19 PERSISTENT ATRIAL FIBRILLATION (H): ICD-10-CM

## 2019-09-23 DIAGNOSIS — I50.33 ACUTE ON CHRONIC HEART FAILURE WITH PRESERVED EJECTION FRACTION (H): ICD-10-CM

## 2019-09-23 DIAGNOSIS — Z95.0 CARDIAC PACEMAKER IN SITU: ICD-10-CM

## 2019-09-29 ENCOUNTER — COMMUNICATION - HEALTHEAST (OUTPATIENT)
Dept: CARDIOLOGY | Facility: CLINIC | Age: 84
End: 2019-09-29

## 2019-10-02 ENCOUNTER — COMMUNICATION - HEALTHEAST (OUTPATIENT)
Dept: CARDIOLOGY | Facility: CLINIC | Age: 84
End: 2019-10-02

## 2019-10-02 DIAGNOSIS — I48.91 A-FIB (H): ICD-10-CM

## 2019-10-09 ENCOUNTER — SURGERY - HEALTHEAST (OUTPATIENT)
Dept: CARDIOLOGY | Facility: CLINIC | Age: 84
End: 2019-10-09

## 2019-10-09 ENCOUNTER — OFFICE VISIT - HEALTHEAST (OUTPATIENT)
Dept: CARDIOLOGY | Facility: CLINIC | Age: 84
End: 2019-10-09

## 2019-10-09 ENCOUNTER — AMBULATORY - HEALTHEAST (OUTPATIENT)
Dept: CARDIOLOGY | Facility: CLINIC | Age: 84
End: 2019-10-09

## 2019-10-09 DIAGNOSIS — I25.5 ISCHEMIC CARDIOMYOPATHY: ICD-10-CM

## 2019-10-09 DIAGNOSIS — I50.9 HEART FAILURE (H): ICD-10-CM

## 2019-10-09 DIAGNOSIS — I50.32 CHRONIC HEART FAILURE WITH PRESERVED EJECTION FRACTION (H): ICD-10-CM

## 2019-10-09 DIAGNOSIS — I49.5 SICK SINUS SYNDROME WITH TACHYCARDIA (H): ICD-10-CM

## 2019-10-09 DIAGNOSIS — Z95.0 CARDIAC PACEMAKER IN SITU: ICD-10-CM

## 2019-10-09 DIAGNOSIS — I48.91 ATRIAL FIBRILLATION (H): ICD-10-CM

## 2019-10-09 LAB
ANION GAP SERPL CALCULATED.3IONS-SCNC: 7 MMOL/L (ref 5–18)
BUN SERPL-MCNC: 22 MG/DL (ref 8–28)
CALCIUM SERPL-MCNC: 8.9 MG/DL (ref 8.5–10.5)
CHLORIDE BLD-SCNC: 95 MMOL/L (ref 98–107)
CO2 SERPL-SCNC: 33 MMOL/L (ref 22–31)
CREAT SERPL-MCNC: 0.88 MG/DL (ref 0.7–1.3)
GFR SERPL CREATININE-BSD FRML MDRD: >60 ML/MIN/1.73M2
GLUCOSE BLD-MCNC: 106 MG/DL (ref 70–125)
POTASSIUM BLD-SCNC: 4.3 MMOL/L (ref 3.5–5)
SODIUM SERPL-SCNC: 135 MMOL/L (ref 136–145)

## 2019-10-09 ASSESSMENT — MIFFLIN-ST. JEOR: SCORE: 1404.63

## 2019-10-10 ENCOUNTER — COMMUNICATION - HEALTHEAST (OUTPATIENT)
Dept: CARDIOLOGY | Facility: CLINIC | Age: 84
End: 2019-10-10

## 2019-10-18 ENCOUNTER — COMMUNICATION - HEALTHEAST (OUTPATIENT)
Dept: FAMILY MEDICINE | Facility: CLINIC | Age: 84
End: 2019-10-18

## 2019-10-18 DIAGNOSIS — R52 PAIN: ICD-10-CM

## 2019-10-23 ENCOUNTER — AMBULATORY - HEALTHEAST (OUTPATIENT)
Dept: CARDIOLOGY | Facility: CLINIC | Age: 84
End: 2019-10-23

## 2019-10-23 DIAGNOSIS — I25.10 CAD (CORONARY ARTERY DISEASE): ICD-10-CM

## 2019-10-23 DIAGNOSIS — Z95.0 CARDIAC PACEMAKER IN SITU: ICD-10-CM

## 2019-10-30 ENCOUNTER — AMBULATORY - HEALTHEAST (OUTPATIENT)
Dept: NURSING | Facility: CLINIC | Age: 84
End: 2019-10-30

## 2019-10-30 DIAGNOSIS — Z23 FLU VACCINE NEED: ICD-10-CM

## 2019-11-20 ENCOUNTER — COMMUNICATION - HEALTHEAST (OUTPATIENT)
Dept: CARDIOLOGY | Facility: CLINIC | Age: 84
End: 2019-11-20

## 2019-11-21 ENCOUNTER — OFFICE VISIT - HEALTHEAST (OUTPATIENT)
Dept: CARDIOLOGY | Facility: CLINIC | Age: 84
End: 2019-11-21

## 2019-11-21 DIAGNOSIS — Z95.0 CARDIAC PACEMAKER IN SITU: ICD-10-CM

## 2019-11-21 DIAGNOSIS — I49.5 SICK SINUS SYNDROME WITH TACHYCARDIA (H): ICD-10-CM

## 2019-11-21 DIAGNOSIS — T82.110A PACEMAKER LEAD MALFUNCTION: ICD-10-CM

## 2019-11-21 DIAGNOSIS — I50.32 CHRONIC HEART FAILURE WITH PRESERVED EJECTION FRACTION (H): ICD-10-CM

## 2019-11-21 DIAGNOSIS — I25.5 ISCHEMIC CARDIOMYOPATHY: ICD-10-CM

## 2019-11-21 DIAGNOSIS — I48.4 ATYPICAL ATRIAL FLUTTER (H): ICD-10-CM

## 2019-12-02 ENCOUNTER — SURGERY - HEALTHEAST (OUTPATIENT)
Dept: CARDIOLOGY | Facility: CLINIC | Age: 84
End: 2019-12-02

## 2019-12-02 ASSESSMENT — MIFFLIN-ST. JEOR: SCORE: 1374.82

## 2019-12-03 ASSESSMENT — MIFFLIN-ST. JEOR: SCORE: 1353.5

## 2019-12-05 ENCOUNTER — RECORDS - HEALTHEAST (OUTPATIENT)
Dept: ADMINISTRATIVE | Facility: OTHER | Age: 84
End: 2019-12-05

## 2019-12-09 ENCOUNTER — AMBULATORY - HEALTHEAST (OUTPATIENT)
Dept: CARDIOLOGY | Facility: CLINIC | Age: 84
End: 2019-12-09

## 2019-12-09 ENCOUNTER — OFFICE VISIT - HEALTHEAST (OUTPATIENT)
Dept: CARDIOLOGY | Facility: CLINIC | Age: 84
End: 2019-12-09

## 2019-12-09 DIAGNOSIS — I25.10 ATHEROSCLEROSIS OF NATIVE CORONARY ARTERY OF NATIVE HEART WITHOUT ANGINA PECTORIS: ICD-10-CM

## 2019-12-09 DIAGNOSIS — I10 ESSENTIAL HYPERTENSION: ICD-10-CM

## 2019-12-09 DIAGNOSIS — I25.5 ISCHEMIC CARDIOMYOPATHY: ICD-10-CM

## 2019-12-09 DIAGNOSIS — Z95.0 BIVENTRICULAR CARDIAC PACEMAKER IN SITU: ICD-10-CM

## 2019-12-09 DIAGNOSIS — I48.21 PERMANENT ATRIAL FIBRILLATION (H): ICD-10-CM

## 2019-12-09 DIAGNOSIS — I49.5 SICK SINUS SYNDROME WITH TACHYCARDIA (H): ICD-10-CM

## 2019-12-09 DIAGNOSIS — I50.32 CHRONIC HEART FAILURE WITH PRESERVED EJECTION FRACTION (H): ICD-10-CM

## 2019-12-09 ASSESSMENT — MIFFLIN-ST. JEOR: SCORE: 1391.6

## 2019-12-20 ENCOUNTER — AMBULATORY - HEALTHEAST (OUTPATIENT)
Dept: CARDIOLOGY | Facility: CLINIC | Age: 84
End: 2019-12-20

## 2019-12-20 DIAGNOSIS — Z95.0 BIVENTRICULAR CARDIAC PACEMAKER IN SITU: ICD-10-CM

## 2019-12-20 DIAGNOSIS — I48.92 ATRIAL FLUTTER, UNSPECIFIED TYPE (H): ICD-10-CM

## 2020-01-01 ENCOUNTER — COMMUNICATION - HEALTHEAST (OUTPATIENT)
Dept: FAMILY MEDICINE | Facility: CLINIC | Age: 85
End: 2020-01-01

## 2020-01-01 ENCOUNTER — AMBULATORY - HEALTHEAST (OUTPATIENT)
Dept: CARDIOLOGY | Facility: CLINIC | Age: 85
End: 2020-01-01

## 2020-01-01 ENCOUNTER — RECORDS - HEALTHEAST (OUTPATIENT)
Dept: ADMINISTRATIVE | Facility: OTHER | Age: 85
End: 2020-01-01

## 2020-01-01 ENCOUNTER — COMMUNICATION - HEALTHEAST (OUTPATIENT)
Dept: CARDIOLOGY | Facility: CLINIC | Age: 85
End: 2020-01-01

## 2020-01-01 ENCOUNTER — COMMUNICATION - HEALTHEAST (OUTPATIENT)
Dept: HOME HEALTH SERVICES | Facility: HOME HEALTH | Age: 85
End: 2020-01-01

## 2020-01-01 ENCOUNTER — OFFICE VISIT - HEALTHEAST (OUTPATIENT)
Dept: CARDIOLOGY | Facility: CLINIC | Age: 85
End: 2020-01-01

## 2020-01-01 ENCOUNTER — COMMUNICATION - HEALTHEAST (OUTPATIENT)
Dept: ADMINISTRATIVE | Facility: CLINIC | Age: 85
End: 2020-01-01

## 2020-01-01 ENCOUNTER — DOCUMENTATION ONLY (OUTPATIENT)
Dept: OTHER | Facility: CLINIC | Age: 85
End: 2020-01-01

## 2020-01-01 ENCOUNTER — OFFICE VISIT - HEALTHEAST (OUTPATIENT)
Dept: FAMILY MEDICINE | Facility: CLINIC | Age: 85
End: 2020-01-01

## 2020-01-01 ENCOUNTER — AMBULATORY - HEALTHEAST (OUTPATIENT)
Dept: PULMONOLOGY | Facility: OTHER | Age: 85
End: 2020-01-01

## 2020-01-01 ENCOUNTER — HOME CARE/HOSPICE - HEALTHEAST (OUTPATIENT)
Dept: HOME HEALTH SERVICES | Facility: HOME HEALTH | Age: 85
End: 2020-01-01

## 2020-01-01 ENCOUNTER — COMMUNICATION - HEALTHEAST (OUTPATIENT)
Dept: PULMONOLOGY | Facility: OTHER | Age: 85
End: 2020-01-01

## 2020-01-01 ENCOUNTER — COMMUNICATION - HEALTHEAST (OUTPATIENT)
Dept: SCHEDULING | Facility: CLINIC | Age: 85
End: 2020-01-01

## 2020-01-01 ENCOUNTER — AMBULATORY - HEALTHEAST (OUTPATIENT)
Dept: FAMILY MEDICINE | Facility: CLINIC | Age: 85
End: 2020-01-01

## 2020-01-01 ENCOUNTER — AMBULATORY - HEALTHEAST (OUTPATIENT)
Dept: OTHER | Facility: CLINIC | Age: 85
End: 2020-01-01

## 2020-01-01 ENCOUNTER — OFFICE VISIT - HEALTHEAST (OUTPATIENT)
Dept: PHARMACY | Facility: CLINIC | Age: 85
End: 2020-01-01

## 2020-01-01 ENCOUNTER — HOSPITAL ENCOUNTER (OUTPATIENT)
Dept: CARDIOLOGY | Facility: HOSPITAL | Age: 85
Discharge: HOME | End: 2020-09-23
Attending: INTERNAL MEDICINE

## 2020-01-01 DIAGNOSIS — I50.32 CHRONIC HEART FAILURE WITH PRESERVED EJECTION FRACTION (H): ICD-10-CM

## 2020-01-01 DIAGNOSIS — I25.5 ISCHEMIC CARDIOMYOPATHY: ICD-10-CM

## 2020-01-01 DIAGNOSIS — I97.190 COMPLETE AV BLOCK DUE TO AV NODAL ABLATION (H): ICD-10-CM

## 2020-01-01 DIAGNOSIS — F33.9 EPISODE OF RECURRENT MAJOR DEPRESSIVE DISORDER, UNSPECIFIED DEPRESSION EPISODE SEVERITY (H): ICD-10-CM

## 2020-01-01 DIAGNOSIS — I44.2 COMPLETE AV BLOCK DUE TO AV NODAL ABLATION (H): ICD-10-CM

## 2020-01-01 DIAGNOSIS — I47.29 PAROXYSMAL VENTRICULAR TACHYCARDIA (H): ICD-10-CM

## 2020-01-01 DIAGNOSIS — R09.02 HYPOXEMIA: ICD-10-CM

## 2020-01-01 DIAGNOSIS — Z95.0 BIVENTRICULAR CARDIAC PACEMAKER IN SITU: ICD-10-CM

## 2020-01-01 DIAGNOSIS — G89.4 CHRONIC PAIN SYNDROME: ICD-10-CM

## 2020-01-01 DIAGNOSIS — Z00.00 MEDICARE ANNUAL WELLNESS VISIT, SUBSEQUENT: ICD-10-CM

## 2020-01-01 DIAGNOSIS — Z79.899 POLYPHARMACY: ICD-10-CM

## 2020-01-01 DIAGNOSIS — I27.20 PULMONARY HYPERTENSION (H): ICD-10-CM

## 2020-01-01 DIAGNOSIS — I48.21 PERMANENT ATRIAL FIBRILLATION (H): ICD-10-CM

## 2020-01-01 DIAGNOSIS — G47.00 INSOMNIA: ICD-10-CM

## 2020-01-01 DIAGNOSIS — I27.22 PULMONARY HYPERTENSION DUE TO LEFT HEART DISEASE (H): ICD-10-CM

## 2020-01-01 DIAGNOSIS — R40.0 DROWSINESS: ICD-10-CM

## 2020-01-01 DIAGNOSIS — I25.10 ATHEROSCLEROSIS OF NATIVE CORONARY ARTERY OF NATIVE HEART WITHOUT ANGINA PECTORIS: ICD-10-CM

## 2020-01-01 DIAGNOSIS — R23.8 FRAGILE SKIN: ICD-10-CM

## 2020-01-01 DIAGNOSIS — I48.91 A-FIB (H): ICD-10-CM

## 2020-01-01 DIAGNOSIS — G47.00 INSOMNIA, UNSPECIFIED TYPE: ICD-10-CM

## 2020-01-01 DIAGNOSIS — Z78.9 POLST (PHYSICIAN ORDERS FOR LIFE-SUSTAINING TREATMENT): ICD-10-CM

## 2020-01-01 DIAGNOSIS — L89.101 PRESSURE INJURY OF BACK, STAGE 1: ICD-10-CM

## 2020-01-01 DIAGNOSIS — F41.9 ANXIETY: ICD-10-CM

## 2020-01-01 DIAGNOSIS — R52 PAIN: ICD-10-CM

## 2020-01-01 DIAGNOSIS — J69.0 ASPIRATION PNEUMONIA, UNSPECIFIED ASPIRATION PNEUMONIA TYPE, UNSPECIFIED LATERALITY, UNSPECIFIED PART OF LUNG (H): ICD-10-CM

## 2020-01-01 DIAGNOSIS — R09.02 HYPOXIA: ICD-10-CM

## 2020-01-01 DIAGNOSIS — J43.9 PULMONARY EMPHYSEMA (H): ICD-10-CM

## 2020-01-01 DIAGNOSIS — J96.01 ACUTE RESPIRATORY FAILURE WITH HYPOXIA (H): ICD-10-CM

## 2020-01-01 DIAGNOSIS — R41.0 CONFUSION: ICD-10-CM

## 2020-01-01 DIAGNOSIS — I48.92 ATRIAL FLUTTER (H): ICD-10-CM

## 2020-01-01 DIAGNOSIS — J43.9 PULMONARY EMPHYSEMA, UNSPECIFIED EMPHYSEMA TYPE (H): ICD-10-CM

## 2020-01-01 DIAGNOSIS — I10 HYPERTENSION: ICD-10-CM

## 2020-01-01 DIAGNOSIS — R68.89 CLINICAL DECOMPENSATION: ICD-10-CM

## 2020-01-01 DIAGNOSIS — I49.5 SICK SINUS SYNDROME WITH TACHYCARDIA (H): ICD-10-CM

## 2020-01-01 DIAGNOSIS — Z23 ENCOUNTER FOR IMMUNIZATION: ICD-10-CM

## 2020-01-01 DIAGNOSIS — I48.0 PAROXYSMAL ATRIAL FIBRILLATION (H): ICD-10-CM

## 2020-01-01 DIAGNOSIS — Z78.9 PATIENT HAS ACTIVE PHYSICIAN ORDERS FOR LIFE-SUSTAINING TREATMENT (POLST) FORM: ICD-10-CM

## 2020-01-01 DIAGNOSIS — I10 ESSENTIAL HYPERTENSION: ICD-10-CM

## 2020-01-01 LAB
ALBUMIN SERPL-MCNC: 3.5 G/DL (ref 3.5–5)
ALP SERPL-CCNC: 172 U/L (ref 45–120)
ALT SERPL W P-5'-P-CCNC: 26 U/L (ref 0–45)
ANION GAP SERPL CALCULATED.3IONS-SCNC: 11 MMOL/L (ref 5–18)
ANION GAP SERPL CALCULATED.3IONS-SCNC: 8 MMOL/L (ref 5–18)
AORTIC ROOT: 3.1 CM
AORTIC VALVE MEAN VELOCITY: 73.1 CM/S
AR DECEL SLOPE: 1720 MM/S2
AR PEAK VELOCITY: 329 CM/S
ASCENDING AORTA: 3.1 CM
AST SERPL W P-5'-P-CCNC: 37 U/L (ref 0–40)
AV DIMENSIONLESS INDEX VTI: 0.8
AV MEAN GRADIENT: 2 MMHG
AV PEAK GRADIENT: 3.7 MMHG
AV REGURGITANT PEAK GRADIENT: 43.3 MMHG
AV REGURGITATION PRESSURE HALF TIME: 560 MS
AV VALVE AREA: 2.5 CM2
BILIRUB DIRECT SERPL-MCNC: 0.3 MG/DL
BILIRUB SERPL-MCNC: 0.6 MG/DL (ref 0–1)
BNP SERPL-MCNC: 409 PG/ML (ref 0–93)
BSA FOR ECHO PROCEDURE: 1.84 M2
BUN SERPL-MCNC: 38 MG/DL (ref 8–28)
BUN SERPL-MCNC: 42 MG/DL (ref 8–28)
CALCIUM SERPL-MCNC: 8.8 MG/DL (ref 8.5–10.5)
CALCIUM SERPL-MCNC: 9.1 MG/DL (ref 8.5–10.5)
CHLORIDE BLD-SCNC: 92 MMOL/L (ref 98–107)
CHLORIDE BLD-SCNC: 95 MMOL/L (ref 98–107)
CO2 SERPL-SCNC: 33 MMOL/L (ref 22–31)
CO2 SERPL-SCNC: 35 MMOL/L (ref 22–31)
CREAT SERPL-MCNC: 0.85 MG/DL (ref 0.7–1.3)
CREAT SERPL-MCNC: 0.88 MG/DL (ref 0.7–1.3)
CV BLOOD PRESSURE: NORMAL MMHG
CV ECHO HEIGHT: 69 IN
CV ECHO WEIGHT: 153 LBS
DOP CALC AO PEAK VEL: 96.7 CM/S
DOP CALC AO VTI: 17.5 CM
DOP CALC LVOT AREA: 3.14 CM2
DOP CALC LVOT DIAMETER: 2 CM
DOP CALC LVOT STROKE VOLUME: 43.6 CM3
DOP CALCLVOT PEAK VEL VTI: 13.9 CM
EJECTION FRACTION: 51 % (ref 55–75)
ERYTHROCYTE [DISTWIDTH] IN BLOOD BY AUTOMATED COUNT: 14.4 % (ref 11–14.5)
FRACTIONAL SHORTENING: 26.9 % (ref 28–44)
GFR SERPL CREATININE-BSD FRML MDRD: >60 ML/MIN/1.73M2
GFR SERPL CREATININE-BSD FRML MDRD: >60 ML/MIN/1.73M2
GLUCOSE BLD-MCNC: 108 MG/DL (ref 70–125)
GLUCOSE BLD-MCNC: 79 MG/DL (ref 70–125)
HCC DEVICE COMMENTS: NORMAL
HCC DEVICE IMPLANTING PROVIDER: NORMAL
HCC DEVICE MANUFACTURE: NORMAL
HCC DEVICE MODEL: NORMAL
HCC DEVICE SERIAL NUMBER: NORMAL
HCC DEVICE TYPE: NORMAL
HCT VFR BLD AUTO: 39 % (ref 40–54)
HGB BLD-MCNC: 12.3 G/DL (ref 14–18)
INTERVENTRICULAR SEPTUM IN END DIASTOLE: 0.7 CM (ref 0.6–1)
IVS/PW RATIO: 0.7
LA AREA 1: 27.4 CM2
LA AREA 2: 30.2 CM2
LDLC SERPL CALC-MCNC: 107 MG/DL
LEFT ATRIUM LENGTH: 5.8 CM
LEFT ATRIUM SIZE: 4.6 CM
LEFT ATRIUM VOLUME INDEX: 65.9 ML/M2
LEFT ATRIUM VOLUME: 121.3 ML
LEFT VENTRICLE CARDIAC INDEX: 1.5 L/MIN/M2
LEFT VENTRICLE CARDIAC OUTPUT: 2.8 L/MIN
LEFT VENTRICLE DIASTOLIC VOLUME INDEX: 47.8 CM3/M2 (ref 34–74)
LEFT VENTRICLE DIASTOLIC VOLUME: 88 CM3 (ref 62–150)
LEFT VENTRICLE HEART RATE: 64 BPM
LEFT VENTRICLE MASS INDEX: 85.3 G/M2
LEFT VENTRICLE SYSTOLIC VOLUME INDEX: 23.4 CM3/M2 (ref 11–31)
LEFT VENTRICLE SYSTOLIC VOLUME: 43 CM3 (ref 21–61)
LEFT VENTRICULAR INTERNAL DIMENSION IN DIASTOLE: 5.2 CM (ref 4.2–5.8)
LEFT VENTRICULAR INTERNAL DIMENSION IN SYSTOLE: 3.8 CM (ref 2.5–4)
LEFT VENTRICULAR MASS: 156.9 G
LEFT VENTRICULAR OUTFLOW TRACT MEAN GRADIENT: 1 MMHG
LEFT VENTRICULAR OUTFLOW TRACT MEAN VELOCITY: 49.4 CM/S
LEFT VENTRICULAR POSTERIOR WALL IN END DIASTOLE: 1 CM (ref 0.6–1)
LV STROKE VOLUME INDEX: 23.7 ML/M2
MCH RBC QN AUTO: 28.3 PG (ref 27–34)
MCHC RBC AUTO-ENTMCNC: 31.5 G/DL (ref 32–36)
MCV RBC AUTO: 90 FL (ref 80–100)
MITRAL REGURGITANT VELOCITY TIME INTEGRAL: 133 CM
MITRAL VALVE E/A RATIO: 1.9
MR MEAN GRADIENT: 43 MMHG
MR MEAN VELOCITY: 305 CM/S
MR PEAK GRADIENT: 70.6 MMHG
MR PISA EROA: 0.1 CM2
MR PISA RADIUS: 0.5 CM
MR PISA VN NYQUIST: 24 CM/S
MV AVERAGE E/E' RATIO: 9.9 CM/S
MV DECELERATION TIME: 165 MS
MV E'TISSUE VEL-LAT: 8.77 CM/S
MV E'TISSUE VEL-MED: 3.41 CM/S
MV LATERAL E/E' RATIO: 6.9
MV MEDIAL E/E' RATIO: 17.7
MV PEAK A VELOCITY: 32.1 CM/S
MV PEAK E VELOCITY: 60.5 CM/S
MV REGURGITANT VOLUME: 11.9 CC
NUC REST DIASTOLIC VOLUME INDEX: 2448 LBS
NUC REST SYSTOLIC VOLUME INDEX: 69 IN
PISA MR PEAK VEL: 420 CM/S
PLATELET # BLD AUTO: 165 THOU/UL (ref 140–440)
PMV BLD AUTO: 7.3 FL (ref 7–10)
POTASSIUM BLD-SCNC: 3.8 MMOL/L (ref 3.5–5)
POTASSIUM BLD-SCNC: 4.2 MMOL/L (ref 3.5–5)
PROT SERPL-MCNC: 8.5 G/DL (ref 6–8)
RBC # BLD AUTO: 4.35 MILL/UL (ref 4.4–6.2)
RIGHT VENTRICULAR INTERNAL DIMENSION IN DYSTOLE: 4.7 CM
SODIUM SERPL-SCNC: 136 MMOL/L (ref 136–145)
SODIUM SERPL-SCNC: 138 MMOL/L (ref 136–145)
TRICUSPID REGURGITATION PEAK PRESSURE GRADIENT: 49 MMHG
TRICUSPID VALVE ANULAR PLANE SYSTOLIC EXCURSION: 1.3 CM
TRICUSPID VALVE PEAK REGURGITANT VELOCITY: 350 CM/S
WBC: 6 THOU/UL (ref 4–11)

## 2020-01-01 ASSESSMENT — MIFFLIN-ST. JEOR
SCORE: 1382.53
SCORE: 1364.38
SCORE: 1374.36
SCORE: 1364.38

## 2020-01-01 ASSESSMENT — PATIENT HEALTH QUESTIONNAIRE - PHQ9: SUM OF ALL RESPONSES TO PHQ QUESTIONS 1-9: 0

## 2020-01-08 ENCOUNTER — COMMUNICATION - HEALTHEAST (OUTPATIENT)
Dept: CARDIOLOGY | Facility: CLINIC | Age: 85
End: 2020-01-08

## 2020-01-08 ENCOUNTER — AMBULATORY - HEALTHEAST (OUTPATIENT)
Dept: CARDIOLOGY | Facility: CLINIC | Age: 85
End: 2020-01-08

## 2020-01-08 DIAGNOSIS — I49.5 SICK SINUS SYNDROME WITH TACHYCARDIA (H): ICD-10-CM

## 2020-01-08 DIAGNOSIS — Z95.0 BIVENTRICULAR CARDIAC PACEMAKER IN SITU: ICD-10-CM

## 2020-01-10 ENCOUNTER — RECORDS - HEALTHEAST (OUTPATIENT)
Dept: ADMINISTRATIVE | Facility: OTHER | Age: 85
End: 2020-01-10

## 2020-01-29 ENCOUNTER — RECORDS - HEALTHEAST (OUTPATIENT)
Dept: ADMINISTRATIVE | Facility: OTHER | Age: 85
End: 2020-01-29

## 2020-02-17 ENCOUNTER — OFFICE VISIT - HEALTHEAST (OUTPATIENT)
Dept: CARDIOLOGY | Facility: CLINIC | Age: 85
End: 2020-02-17

## 2020-02-17 DIAGNOSIS — Z95.0 BIVENTRICULAR CARDIAC PACEMAKER IN SITU: ICD-10-CM

## 2020-02-17 DIAGNOSIS — I50.32 CHRONIC HEART FAILURE WITH PRESERVED EJECTION FRACTION (H): ICD-10-CM

## 2020-02-17 ASSESSMENT — MIFFLIN-ST. JEOR: SCORE: 1446.03

## 2020-03-02 ENCOUNTER — AMBULATORY - HEALTHEAST (OUTPATIENT)
Dept: CARDIOLOGY | Facility: CLINIC | Age: 85
End: 2020-03-02

## 2020-03-02 DIAGNOSIS — Z95.0 BIVENTRICULAR CARDIAC PACEMAKER IN SITU: ICD-10-CM

## 2020-03-02 DIAGNOSIS — I97.190 COMPLETE AV BLOCK DUE TO AV NODAL ABLATION (H): ICD-10-CM

## 2020-03-02 DIAGNOSIS — I44.2 COMPLETE AV BLOCK DUE TO AV NODAL ABLATION (H): ICD-10-CM

## 2020-03-03 ENCOUNTER — COMMUNICATION - HEALTHEAST (OUTPATIENT)
Dept: CARDIOLOGY | Facility: CLINIC | Age: 85
End: 2020-03-03

## 2020-03-05 ENCOUNTER — OFFICE VISIT - HEALTHEAST (OUTPATIENT)
Dept: CARDIOLOGY | Facility: CLINIC | Age: 85
End: 2020-03-05

## 2020-03-05 DIAGNOSIS — I50.9 CHF (CONGESTIVE HEART FAILURE) (H): ICD-10-CM

## 2020-03-05 LAB
ANION GAP SERPL CALCULATED.3IONS-SCNC: 10 MMOL/L (ref 5–18)
BUN SERPL-MCNC: 35 MG/DL (ref 8–28)
CALCIUM SERPL-MCNC: 9 MG/DL (ref 8.5–10.5)
CHLORIDE BLD-SCNC: 93 MMOL/L (ref 98–107)
CO2 SERPL-SCNC: 30 MMOL/L (ref 22–31)
CREAT SERPL-MCNC: 1.2 MG/DL (ref 0.7–1.3)
GFR SERPL CREATININE-BSD FRML MDRD: 58 ML/MIN/1.73M2
GLUCOSE BLD-MCNC: 99 MG/DL (ref 70–125)
POTASSIUM BLD-SCNC: 4.6 MMOL/L (ref 3.5–5)
SODIUM SERPL-SCNC: 133 MMOL/L (ref 136–145)

## 2020-03-05 ASSESSMENT — MIFFLIN-ST. JEOR: SCORE: 1455.1

## 2020-03-09 ENCOUNTER — AMBULATORY - HEALTHEAST (OUTPATIENT)
Dept: LAB | Facility: CLINIC | Age: 85
End: 2020-03-09

## 2020-03-09 ENCOUNTER — COMMUNICATION - HEALTHEAST (OUTPATIENT)
Dept: CARDIOLOGY | Facility: CLINIC | Age: 85
End: 2020-03-09

## 2020-03-09 DIAGNOSIS — I50.32 CHRONIC HEART FAILURE WITH PRESERVED EJECTION FRACTION (H): ICD-10-CM

## 2020-03-09 DIAGNOSIS — I50.9 CHF (CONGESTIVE HEART FAILURE) (H): ICD-10-CM

## 2020-03-09 LAB
ANION GAP SERPL CALCULATED.3IONS-SCNC: 12 MMOL/L (ref 5–18)
BUN SERPL-MCNC: 32 MG/DL (ref 8–28)
CALCIUM SERPL-MCNC: 9.7 MG/DL (ref 8.5–10.5)
CHLORIDE BLD-SCNC: 84 MMOL/L (ref 98–107)
CO2 SERPL-SCNC: 38 MMOL/L (ref 22–31)
CREAT SERPL-MCNC: 1.14 MG/DL (ref 0.7–1.3)
GFR SERPL CREATININE-BSD FRML MDRD: >60 ML/MIN/1.73M2
GLUCOSE BLD-MCNC: 121 MG/DL (ref 70–125)
POTASSIUM BLD-SCNC: 3.2 MMOL/L (ref 3.5–5)
SODIUM SERPL-SCNC: 134 MMOL/L (ref 136–145)

## 2020-03-11 ENCOUNTER — COMMUNICATION - HEALTHEAST (OUTPATIENT)
Dept: CARDIOLOGY | Facility: CLINIC | Age: 85
End: 2020-03-11

## 2020-03-11 DIAGNOSIS — I50.32 CHRONIC HEART FAILURE WITH PRESERVED EJECTION FRACTION (H): ICD-10-CM

## 2020-03-12 ENCOUNTER — COMMUNICATION - HEALTHEAST (OUTPATIENT)
Dept: FAMILY MEDICINE | Facility: CLINIC | Age: 85
End: 2020-03-12

## 2020-03-12 ENCOUNTER — COMMUNICATION - HEALTHEAST (OUTPATIENT)
Dept: CARDIOLOGY | Facility: CLINIC | Age: 85
End: 2020-03-12

## 2020-03-13 ENCOUNTER — AMBULATORY - HEALTHEAST (OUTPATIENT)
Dept: LAB | Facility: CLINIC | Age: 85
End: 2020-03-13

## 2020-03-13 ENCOUNTER — COMMUNICATION - HEALTHEAST (OUTPATIENT)
Dept: FAMILY MEDICINE | Facility: CLINIC | Age: 85
End: 2020-03-13

## 2020-03-13 DIAGNOSIS — I50.32 CHRONIC HEART FAILURE WITH PRESERVED EJECTION FRACTION (H): ICD-10-CM

## 2020-03-13 DIAGNOSIS — E66.3 OVERWEIGHT: ICD-10-CM

## 2020-03-13 LAB
ALBUMIN SERPL-MCNC: 3.3 G/DL (ref 3.5–5)
ALP SERPL-CCNC: 179 U/L (ref 45–120)
ALT SERPL W P-5'-P-CCNC: 37 U/L (ref 0–45)
ANION GAP SERPL CALCULATED.3IONS-SCNC: 10 MMOL/L (ref 5–18)
AST SERPL W P-5'-P-CCNC: 48 U/L (ref 0–40)
BILIRUB SERPL-MCNC: 1 MG/DL (ref 0–1)
BUN SERPL-MCNC: 51 MG/DL (ref 8–28)
CALCIUM SERPL-MCNC: 9.6 MG/DL (ref 8.5–10.5)
CHLORIDE BLD-SCNC: 91 MMOL/L (ref 98–107)
CO2 SERPL-SCNC: 38 MMOL/L (ref 22–31)
CREAT SERPL-MCNC: 1.31 MG/DL (ref 0.7–1.3)
GFR SERPL CREATININE-BSD FRML MDRD: 52 ML/MIN/1.73M2
GLUCOSE BLD-MCNC: 151 MG/DL (ref 70–125)
POTASSIUM BLD-SCNC: 3.6 MMOL/L (ref 3.5–5)
PROT SERPL-MCNC: 8.4 G/DL (ref 6–8)
SODIUM SERPL-SCNC: 139 MMOL/L (ref 136–145)

## 2020-03-16 ENCOUNTER — COMMUNICATION - HEALTHEAST (OUTPATIENT)
Dept: CARDIOLOGY | Facility: CLINIC | Age: 85
End: 2020-03-16

## 2020-03-16 ENCOUNTER — COMMUNICATION - HEALTHEAST (OUTPATIENT)
Dept: FAMILY MEDICINE | Facility: CLINIC | Age: 85
End: 2020-03-16

## 2020-03-17 ENCOUNTER — COMMUNICATION - HEALTHEAST (OUTPATIENT)
Dept: CARDIOLOGY | Facility: CLINIC | Age: 85
End: 2020-03-17

## 2020-03-17 DIAGNOSIS — I50.32 CHRONIC HEART FAILURE WITH PRESERVED EJECTION FRACTION (H): ICD-10-CM

## 2020-03-17 DIAGNOSIS — I10 HYPERTENSION: ICD-10-CM

## 2020-03-17 DIAGNOSIS — I48.91 A-FIB (H): ICD-10-CM

## 2020-03-20 ENCOUNTER — AMBULATORY - HEALTHEAST (OUTPATIENT)
Dept: OTHER | Facility: CLINIC | Age: 85
End: 2020-03-20

## 2020-03-20 ENCOUNTER — DOCUMENTATION ONLY (OUTPATIENT)
Dept: OTHER | Facility: CLINIC | Age: 85
End: 2020-03-20

## 2020-03-27 ENCOUNTER — OFFICE VISIT - HEALTHEAST (OUTPATIENT)
Dept: FAMILY MEDICINE | Facility: CLINIC | Age: 85
End: 2020-03-27

## 2020-03-27 ENCOUNTER — COMMUNICATION - HEALTHEAST (OUTPATIENT)
Dept: CARDIOLOGY | Facility: CLINIC | Age: 85
End: 2020-03-27

## 2020-03-27 DIAGNOSIS — I10 ESSENTIAL HYPERTENSION: ICD-10-CM

## 2020-03-27 DIAGNOSIS — I50.9 CHF (CONGESTIVE HEART FAILURE) (H): ICD-10-CM

## 2020-03-27 DIAGNOSIS — I50.32 CHRONIC HEART FAILURE WITH PRESERVED EJECTION FRACTION (H): ICD-10-CM

## 2020-03-27 DIAGNOSIS — I48.21 PERMANENT ATRIAL FIBRILLATION (H): ICD-10-CM

## 2020-03-27 DIAGNOSIS — I44.2 COMPLETE AV BLOCK DUE TO AV NODAL ABLATION (H): ICD-10-CM

## 2020-03-27 DIAGNOSIS — I25.10 ATHEROSCLEROSIS OF NATIVE CORONARY ARTERY OF NATIVE HEART WITHOUT ANGINA PECTORIS: ICD-10-CM

## 2020-03-27 DIAGNOSIS — M62.81 GENERALIZED MUSCLE WEAKNESS: ICD-10-CM

## 2020-03-27 DIAGNOSIS — I97.190 COMPLETE AV BLOCK DUE TO AV NODAL ABLATION (H): ICD-10-CM

## 2020-03-30 ENCOUNTER — COMMUNICATION - HEALTHEAST (OUTPATIENT)
Dept: CARDIOLOGY | Facility: CLINIC | Age: 85
End: 2020-03-30

## 2020-03-30 DIAGNOSIS — I50.9 CHF (CONGESTIVE HEART FAILURE) (H): ICD-10-CM

## 2020-04-02 ENCOUNTER — RECORDS - HEALTHEAST (OUTPATIENT)
Dept: ADMINISTRATIVE | Facility: OTHER | Age: 85
End: 2020-04-02

## 2020-04-06 ENCOUNTER — OFFICE VISIT - HEALTHEAST (OUTPATIENT)
Dept: FAMILY MEDICINE | Facility: CLINIC | Age: 85
End: 2020-04-06

## 2020-04-06 DIAGNOSIS — K59.01 SLOW TRANSIT CONSTIPATION: ICD-10-CM

## 2020-04-21 ENCOUNTER — COMMUNICATION - HEALTHEAST (OUTPATIENT)
Dept: FAMILY MEDICINE | Facility: CLINIC | Age: 85
End: 2020-04-21

## 2020-04-21 DIAGNOSIS — I50.32 CHRONIC HEART FAILURE WITH PRESERVED EJECTION FRACTION (H): ICD-10-CM

## 2020-04-21 DIAGNOSIS — R63.0 ANOREXIA: ICD-10-CM

## 2020-04-30 ENCOUNTER — AMBULATORY - HEALTHEAST (OUTPATIENT)
Dept: SCHEDULING | Facility: CLINIC | Age: 85
End: 2020-04-30

## 2020-04-30 DIAGNOSIS — I50.9 CHRONIC HEART FAILURE, UNSPECIFIED HEART FAILURE TYPE (H): ICD-10-CM

## 2020-04-30 DIAGNOSIS — R63.0 ANOREXIA: ICD-10-CM

## 2021-01-01 ENCOUNTER — RECORDS - HEALTHEAST (OUTPATIENT)
Dept: ADMINISTRATIVE | Facility: OTHER | Age: 86
End: 2021-01-01

## 2021-01-01 ENCOUNTER — AMBULATORY - HEALTHEAST (OUTPATIENT)
Dept: NURSING | Facility: CLINIC | Age: 86
End: 2021-01-01

## 2021-01-01 ENCOUNTER — AMBULATORY - HEALTHEAST (OUTPATIENT)
Dept: CARDIOLOGY | Facility: CLINIC | Age: 86
End: 2021-01-01

## 2021-01-01 ENCOUNTER — COMMUNICATION - HEALTHEAST (OUTPATIENT)
Dept: CARDIOLOGY | Facility: CLINIC | Age: 86
End: 2021-01-01

## 2021-01-01 ENCOUNTER — COMMUNICATION - HEALTHEAST (OUTPATIENT)
Dept: FAMILY MEDICINE | Facility: CLINIC | Age: 86
End: 2021-01-01

## 2021-01-01 DIAGNOSIS — I44.2 THIRD DEGREE AV BLOCK (H): ICD-10-CM

## 2021-01-01 DIAGNOSIS — I48.91 A-FIB (H): ICD-10-CM

## 2021-01-01 DIAGNOSIS — Z95.0 BIVENTRICULAR CARDIAC PACEMAKER IN SITU: ICD-10-CM

## 2021-01-01 DIAGNOSIS — I25.5 ISCHEMIC CARDIOMYOPATHY: ICD-10-CM

## 2021-01-01 DIAGNOSIS — I49.5 SICK SINUS SYNDROME WITH TACHYCARDIA (H): ICD-10-CM

## 2021-01-01 DIAGNOSIS — I48.19 PERSISTENT ATRIAL FIBRILLATION (H): ICD-10-CM

## 2021-01-01 LAB
HCC DEVICE COMMENTS: NORMAL
HCC DEVICE COMMENTS: NORMAL
HCC DEVICE IMPLANTING PROVIDER: NORMAL
HCC DEVICE IMPLANTING PROVIDER: NORMAL
HCC DEVICE MANUFACTURE: NORMAL
HCC DEVICE MANUFACTURE: NORMAL
HCC DEVICE MODEL: NORMAL
HCC DEVICE MODEL: NORMAL
HCC DEVICE SERIAL NUMBER: NORMAL
HCC DEVICE SERIAL NUMBER: NORMAL
HCC DEVICE TYPE: NORMAL
HCC DEVICE TYPE: NORMAL

## 2021-05-13 ENCOUNTER — AMBULATORY - HEALTHEAST (OUTPATIENT)
Dept: CARDIOLOGY | Facility: CLINIC | Age: 86
End: 2021-05-13

## 2021-05-13 ENCOUNTER — COMMUNICATION - HEALTHEAST (OUTPATIENT)
Dept: CARDIOLOGY | Facility: CLINIC | Age: 86
End: 2021-05-13

## 2021-05-13 ENCOUNTER — COMMUNICATION - HEALTHEAST (OUTPATIENT)
Dept: FAMILY MEDICINE | Facility: CLINIC | Age: 86
End: 2021-05-13

## 2021-05-27 ASSESSMENT — PATIENT HEALTH QUESTIONNAIRE - PHQ9: SUM OF ALL RESPONSES TO PHQ QUESTIONS 1-9: 0

## 2021-05-27 NOTE — TELEPHONE ENCOUNTER
Refill Approved    Rx renewed per Medication Renewal Policy. Medication was last renewed on 1/3/19.    Marilee Loza, [facility] Triage/Med Refill 4/4/2019     Requested Prescriptions   Pending Prescriptions Disp Refills     gabapentin (NEURONTIN) 300 MG capsule [Pharmacy Med Name: Gabapentin Oral Capsule 300 MG] 270 capsule 0     Sig: TAKE 1 CAPSULE BY MOUTH 3 TIMES DAILY. MAY TAKE 2-3 CAPSULES BY MOUTH DAILY, DOES NOT NEED TO BE 3 SEPERATE TIMES    Gabapentin/Levetiracetam/Tiagabine Refill Protocol  Passed - 4/3/2019  9:49 AM       Passed - PCP or prescribing provider visit in past 12 months or next 3 months    Last office visit with prescriber/PCP: 9/21/2017 Timbo Auguste MD OR same dept: Visit date not found OR same specialty: 10/26/2017 Dilshad Khan MD  Last physical: 6/11/2018 Last MTM visit: Visit date not found   Next visit within 3 mo: Visit date not found  Next physical within 3 mo: Visit date not found  Prescriber OR PCP: Timbo Auguste MD  Last diagnosis associated with med order: 1. Pain  - gabapentin (NEURONTIN) 300 MG capsule [Pharmacy Med Name: Gabapentin Oral Capsule 300 MG]; TAKE 1 CAPSULE BY MOUTH 3 TIMES DAILY. MAY TAKE 2-3 CAPSULES BY MOUTH DAILY, DOES NOT NEED TO BE 3 SEPERATE TIMES  Dispense: 270 capsule; Refill: 0    If protocol passes may refill for 12 months if within 3 months of last provider visit (or a total of 15 months).

## 2021-05-28 NOTE — TELEPHONE ENCOUNTER
Due to be seen    Rx renewed per Medication Renewal Policy. Medication was last renewed on 11/16/18.    Marilee Loza, [facility] Triage/Med Refill 5/16/2019     Requested Prescriptions   Pending Prescriptions Disp Refills     mirtazapine (REMERON) 7.5 MG tablet [Pharmacy Med Name: Mirtazapine Oral Tablet 7.5 MG] 270 tablet 0     Sig: TAKE 1 TO 3 TABLETS BY MOUTH AT BEDTIME.       Tricyclics/Misc Antidepressant/Antianxiety Meds Refill Protocol Passed - 5/16/2019  9:19 AM        Passed - PCP or prescribing provider visit in last year     Last office visit with prescriber/PCP: 9/21/2017 Timbo Auguste MD OR same dept: Visit date not found OR same specialty: 10/26/2017 Dilshad Khan MD  Last physical: 6/11/2018 Last MTM visit: Visit date not found   Next visit within 3 mo: Visit date not found  Next physical within 3 mo: Visit date not found  Prescriber OR PCP: Timbo Auguste MD  Last diagnosis associated with med order: 1. Insomnia  - mirtazapine (REMERON) 7.5 MG tablet [Pharmacy Med Name: Mirtazapine Oral Tablet 7.5 MG]; TAKE 1 TO 3 TABLETS BY MOUTH AT BEDTIME  Dispense: 270 tablet; Refill: 0    If protocol passes may refill for 12 months if within 3 months of last provider visit (or a total of 15 months).

## 2021-05-29 NOTE — PROGRESS NOTES
Assessment and Plan:       1. Medicare annual wellness visit, subsequent    Reviewed the annual wellness visit health risk assessment form  Mini cog score is 4/5  PHQ-2 score is 0  Reviewed falls risk    Vaccines are up to date    2. Hypertension    Continue lisinopril and metoprolol    - Basic Metabolic Panel  - Hepatic Profile  - Lipid Cascade  - HM2(CBC w/o Differential)    3. Atherosclerosis of native coronary artery of native heart without angina pectoris  4. Persistent atrial fibrillation (H)  5. Sick sinus syndrome with tachycardia (H)    Continue plan of cardiology, Dr. Hartman    Continue furosemide  Continue lisinopril and metoprolol  Continue Eliquis    6. Dysarthria    Follow-up with [facility] as planned        The patient's current medical problems were reviewed.    I have had an Advance Directives discussion with the patient.  The following health maintenance schedule was reviewed with the patient and provided in printed form in the after visit summary:   Health Maintenance   Topic Date Due     DEPRESSION FOLLOW UP  07/17/1934     FALL RISK ASSESSMENT  05/18/2018     INFLUENZA VACCINE RULE BASED (Season Ended) 08/01/2019     ADVANCE DIRECTIVES DISCUSSED WITH PATIENT  06/11/2023     TD 18+ HE  06/06/2024     PNEUMOCOCCAL POLYSACCHARIDE VACCINE AGE 65 AND OVER  Completed     PNEUMOCOCCAL CONJUGATE VACCINE FOR ADULTS (PCV13 OR PREVNAR)  Completed     ZOSTER VACCINES  Completed        Subjective:   Chief Complaint: Frank Robles is an 84 y.o. male here for an Annual Wellness visit.   HPI: This is a pleasant 84-year-old male presents to the clinic for an annual wellness visit.    He has a known history of ischemic cardiomyopathy with previous coronary artery bypass graft surgery and atrial flutter followed by cardiology previously at the [facility] and now with [facility].  He has a history of sick sinus syndrome treated with pacemaker placement.  He had previous surgery which was  complicated by a left atrial appendage laceration which required a second operation.  He developed a left phrenic neuropathy.  He has a history of atrial fibrillation with previous pulmonary vein isolation ablation.  He continues to be treated with Eliquis.    His blood pressure has been stable and he continues to take lisinopril as well as metoprolol.  He also takes furosemide.    He states that he has remained physically active by walking.  He rates his health as excellent.  He exercises 5-7 times per week.  Is not requiring assistance with activities of daily living.    He has follow-up with [facility] previously for dysarthria as well.  There is been no change recently.  He denies any swallowing difficulties.    Review of systems is negative for headache, chest pain, shortness of breath, palpitations, bowel changes, or concerns.      Review of Systems:   Please see above.  The rest of the review of systems are negative for all systems.    Patient Care Team:  Timbo Auguste MD as PCP - General (Family Medicine)     Patient Active Problem List   Diagnosis     Cardiac Devices Pacemaker Present     Adverse Effect Of Anticholinergics     Chronic Major Depression     Frequent, Full-bladder Emptying (Polyuria)     Constipation     Insomnia     Actinic Keratosis     Lipoma Of The Adipose Tissue     Hypertension     Coronary Artery Disease     Congestive Heart Failure     Persistent Atrial Fibrillation     Chronic Reflux Esophagitis     Ischemic cardiomyopathy     Sick sinus syndrome with tachycardia (H)     Pacemaker lead malfunction     Atrial flutter (H)     Dysarthria     Past Medical History:   Diagnosis Date     Actinic keratosis      Acute posthemorrhagic anemia      Anemia      Anxiety disorder due to general medical condition      Atrial flutter (H)      CAD (coronary artery disease)      Cardiomyopathy (H)      CHF (congestive heart failure) (H)      Chronic reflux esophagitis      Constipation       Decubitus ulcer      Fatigue      Fatigue      Foot pain      Hemorrhoids      HTN (hypertension)      Insomnia      Lipoma     adipose tissue     Major depression, chronic      Pacemaker lead malfunction 9/8/2014     Pleural effusion      Polyuria      Slurred speech     few months after open heart     SSS (sick sinus syndrome) (H)      Transient global amnesia      Vasovagal syncope       Past Surgical History:   Procedure Laterality Date     CARDIAC PACEMAKER PLACEMENT       RI ABLATE HEART DYSRHYTHM FOCUS      Description: Catheter Ablation Atrial Fibrillation;  Recorded: 02/05/2014;  Comments: PVI Aug 2009: (PVI-RF + roof line)     RI CABG, VEIN, SINGLE      Description: CABG (CABG);  Proc Date: 02/07/2013;  Comments: Dr. Jatinder Simpson at [facility] ; Feb 2013: CAB X 3 (LIMA to LAD, SVG to OM, SVG to PDA); ; Reopened same day for RA bleeding     RI REMOVAL OF TONSILS,<11 Y/O      Description: Tonsillectomy;  Recorded: 03/26/2013;     TOTAL HIP ARTHROPLASTY      bilateral      Family History   Problem Relation Age of Onset     Heart attack Father 76      Social History     Socioeconomic History     Marital status:      Spouse name: Not on file     Number of children: Not on file     Years of education: Not on file     Highest education level: Not on file   Occupational History     Not on file   Social Needs     Financial resource strain: Not on file     Food insecurity:     Worry: Not on file     Inability: Not on file     Transportation needs:     Medical: Not on file     Non-medical: Not on file   Tobacco Use     Smoking status: Never Smoker     Smokeless tobacco: Never Used   Substance and Sexual Activity     Alcohol use: Yes     Alcohol/week: 0.6 oz     Types: 1 Cans of beer per week     Drug use: No     Sexual activity: Not on file   Lifestyle     Physical activity:     Days per week: Not on file     Minutes per session: Not on file     Stress: Not on file   Relationships     Social connections:      "Talks on phone: Not on file     Gets together: Not on file     Attends Caodaism service: Not on file     Active member of club or organization: Not on file     Attends meetings of clubs or organizations: Not on file     Relationship status: Not on file     Intimate partner violence:     Fear of current or ex partner: Not on file     Emotionally abused: Not on file     Physically abused: Not on file     Forced sexual activity: Not on file   Other Topics Concern     Not on file   Social History Narrative     Not on file      Current Outpatient Medications   Medication Sig Dispense Refill     ELIQUIS 5 mg Tab tablet TAKE 1 TABLET BY MOUTH TWICE DAILY 180 tablet 1     furosemide (LASIX) 20 MG tablet Take 1 tablet (20 mg total) by mouth 2 (two) times a day at 9am and 6pm. Patient currently takes one daily 180 tablet 3     gabapentin (NEURONTIN) 300 MG capsule TAKE 1 CAPSULE BY MOUTH 3 TIMES DAILY. MAY TAKE 2-3 CAPSULES BY MOUTH DAILY, DOES NOT NEED TO BE 3 SEPERATE TIMES 270 capsule 0     lisinopril (PRINIVIL,ZESTRIL) 20 MG tablet Take one PO Qday 90 tablet 3     MELATONIN ORAL Take by mouth.       metoprolol succinate (TOPROL-XL) 25 MG Take 2 tablets (50 mg total) by mouth daily. 90 tablet 3     mirtazapine (REMERON) 7.5 MG tablet TAKE 1 TO 3 TABLETS BY MOUTH AT BEDTIME. 270 tablet 0     valACYclovir (VALTREX) 500 MG tablet 500 mg daily.        No current facility-administered medications for this visit.       Objective:   Vital Signs:   Visit Vitals  /58   Pulse 68   Temp 97.5  F (36.4  C) (Oral)   Resp 16   Ht 5' 8.5\" (1.74 m)   Wt 170 lb 5 oz (77.3 kg)   BMI 25.52 kg/m         VisionScreening:  No exam data present     PHYSICAL EXAM  General appearance: alert, appears stated age and cooperative  Head: Normocephalic, without obvious abnormality, atraumatic  Eyes: conjunctivae/corneas clear. PERRL, EOM's intact.   Ears: normal TM's and external ear canals both ears  Nose: Nares normal. Septum midline. Mucosa " normal. No drainage or sinus tenderness.  Throat: lips, mucosa, and tongue normal; teeth and gums normal  Neck: no adenopathy, supple, symmetrical, trachea midline and thyroid not enlarged  Back: symmetric, no curvature. ROM normal. No CVA tenderness.  Lungs: clear to auscultation bilaterally  Heart: regular rate and rhythm, S1, S2 normal, no murmur, click, rub or gallop  Abdomen: soft, non-tender; bowel sounds normal; no masses,  no organomegaly  Extremities: extremities normal, atraumatic, no cyanosis or edema  Skin: Skin color, texture, turgor normal. No rashes or lesions  Lymph nodes: Cervical nodes normal.  Neurologic: Alert and oriented X 3.  Speech is slow and deliberate      Assessment Results 6/14/2019   Activities of Daily Living No help needed   Instrumental Activities of Daily Living No help needed   Mini Cog Total Score 4   Some recent data might be hidden     A Mini-Cog score of 0-2 suggests the possibility of dementia, score of 3-5 suggests no dementia    Identified Health Risks:     Patient's advanced directive was discussed and I am comfortable with the patient's wishes.

## 2021-05-29 NOTE — PATIENT INSTRUCTIONS - HE
Advance Directive  Patient s advance directive was discussed and I am comfortable with the patient s wishes.  Patient Education   Personalized Prevention Plan  You are due for the preventive services outlined below.  Your care team is available to assist you in scheduling these services.  If you have already completed any of these items, please share that information with your care team to update in your medical record.  Health Maintenance   Topic Date Due     DEPRESSION FOLLOW UP  07/17/1934     FALL RISK ASSESSMENT  05/18/2018     INFLUENZA VACCINE RULE BASED (Season Ended) 08/01/2019     ADVANCE DIRECTIVES DISCUSSED WITH PATIENT  06/11/2023     TD 18+ HE  06/06/2024     PNEUMOCOCCAL POLYSACCHARIDE VACCINE AGE 65 AND OVER  Completed     PNEUMOCOCCAL CONJUGATE VACCINE FOR ADULTS (PCV13 OR PREVNAR)  Completed     ZOSTER VACCINES  Completed

## 2021-05-30 VITALS — BODY MASS INDEX: 25.02 KG/M2 | WEIGHT: 169.4 LBS

## 2021-05-30 NOTE — TELEPHONE ENCOUNTER
Remote device check reviewed.  Probable short run of nonsustained VT (approximately 11 seconds).  Spontaneous termination.  EGM is different than the conduction.  No symptoms reported.  Most recent LVEF 43% (male echo).  No change in current medical therapy.  No change in device clinic follow-up,  Follow-up with me in 2-3 months in device clinic.

## 2021-05-30 NOTE — TELEPHONE ENCOUNTER
Type: routine pacemaker remote transmission.   Presenting rhythm: atrial fibrillation with ventricular pacing 77 bpm.  Battery/lead status: stable.  Arrhythmias: since 4/10/19; one ventricular high rate episode, appears to be 7sec NSVT avg. 180 bpm, EF=41% 6/7/18  Anticoagulant: Eliquis.   Comments: normal pacemaker function. Routed to device RN for review. DANICA      Transmission reviewed. Chronic AF, On Eliquis. VHR Episode on 6/7/19 shows possible NSVT lasting ~7 seconds during AF. Notable change in Vs morphology seen on episode EGM. A similar episode was noted last August and was labeled as RVR at that time. Per Echo at [facility] on 6/7/18 EF 41%.     Call placed to patient to review/assess. Left VM.     Will review EGMs with Dr. Segundo for rhythm identification and recommendations.     Rosita Hunt RN

## 2021-05-30 NOTE — PROGRESS NOTES
[facility] Clinic Note    Name: Frank Robles  : 1934   MRN: [id number]    Frank Robles is a 85 y.o. male presenting to discuss the following:     CC:   Chief Complaint   Patient presents with     Knee Pain     Left knee     HPI:  Bernard presents to clinic today for evaluation of left knee pain. Pain started overnight, woke up with pain. Not red, not warm to touch, mild swelling present. No known acute injuries although reports remote injury. At baseline able to ambulate without assistance, utilizing a cane today due to pain and in wheelchair in clinic.     Denies history of gout or pseudogout.     ROS:   CONSTITUTIONAL: No fevers, no chills.   MSK: Range of motion remains intact.   NEURO: No focal numbness, tingling, or weakness.     PMH:   Patient Active Problem List   Diagnosis     Cardiac Devices Pacemaker Present     Adverse Effect Of Anticholinergics     Chronic Major Depression     Frequent, Full-bladder Emptying (Polyuria)     Constipation     Insomnia     Actinic Keratosis     Lipoma Of The Adipose Tissue     Hypertension     Coronary Artery Disease     Congestive Heart Failure     Persistent Atrial Fibrillation     Chronic Reflux Esophagitis     Ischemic cardiomyopathy     Sick sinus syndrome with tachycardia (H)     Pacemaker lead malfunction     Atrial flutter (H)     Dysarthria       Past Medical History:   Diagnosis Date     Actinic keratosis      Acute posthemorrhagic anemia      Anemia      Anxiety disorder due to general medical condition      Atrial flutter (H)      CAD (coronary artery disease)      Cardiomyopathy (H)      CHF (congestive heart failure) (H)      Chronic reflux esophagitis      Constipation      Decubitus ulcer      Fatigue      Fatigue      Foot pain      Hemorrhoids      HTN (hypertension)      Insomnia      Lipoma     adipose tissue     Major depression, chronic      Pacemaker lead malfunction 2014     Pleural effusion      Polyuria      Slurred speech     few  months after open heart     SSS (sick sinus syndrome) (H)      Transient global amnesia      Vasovagal syncope        PSH:   Past Surgical History:   Procedure Laterality Date     CARDIAC PACEMAKER PLACEMENT       KS ABLATE HEART DYSRHYTHM FOCUS      Description: Catheter Ablation Atrial Fibrillation;  Recorded: 02/05/2014;  Comments: PVI Aug 2009: (PVI-RF + roof line)     KS CABG, VEIN, SINGLE      Description: CABG (CABG);  Proc Date: 02/07/2013;  Comments: Dr. Jatinder Simpson at [facility] ; Feb 2013: CAB X 3 (LIMA to LAD, SVG to OM, SVG to PDA); ; Reopened same day for RA bleeding     KS REMOVAL OF TONSILS,<11 Y/O      Description: Tonsillectomy;  Recorded: 03/26/2013;     TOTAL HIP ARTHROPLASTY      bilateral         MEDICATIONS:   Current Outpatient Medications on File Prior to Visit   Medication Sig Dispense Refill     ELIQUIS 5 mg Tab tablet TAKE 1 TABLET BY MOUTH TWICE DAILY 180 tablet 1     furosemide (LASIX) 20 MG tablet Take 1 tablet (20 mg total) by mouth 2 (two) times a day at 9am and 6pm. Patient currently takes one daily 180 tablet 3     gabapentin (NEURONTIN) 300 MG capsule TAKE 1 CAPSULE BY MOUTH 3 TIMES PER DAY.  MAY TAKE 2 TO 3 CAPSULES BY MOUTH DAILY, DOES NOT NEED TO BE 3 SEPERATE TIMES.  270 capsule 3     lisinopril (PRINIVIL,ZESTRIL) 20 MG tablet Take one PO Qday 90 tablet 3     MELATONIN ORAL Take by mouth.       metoprolol succinate (TOPROL-XL) 25 MG Take 2 tablets (50 mg total) by mouth daily. 90 tablet 3     mirtazapine (REMERON) 7.5 MG tablet TAKE 1 TO 3 TABLETS BY MOUTH AT BEDTIME. 270 tablet 0     valACYclovir (VALTREX) 500 MG tablet 500 mg daily.        No current facility-administered medications on file prior to visit.        ALLERGIES:  Allergies   Allergen Reactions     Zolpidem Other (See Comments)     Hallucinations needed to be restrained in hospital     Beta-Blockers (Beta-Adrenergic Blocking Agts) Other (See Comments)     Fatigue if metoprolol > 12.5 mg     Diphenhydramine Hcl  Other (See Comments)     tachycardia       PHYSICAL EXAM:   /72   Pulse 92   Temp 98.4  F (36.9  C) (Oral)   Resp 24    GENERAL: Bernard is a pleasant, elderly male in no acute distress.   HEART: Regular rate and rhythm, no murmurs.   LUNGS: Clear to auscultation bilaterally, unlabored.   MSK: Negative anterior and posterior drawer test. Pain with passive extension of knee. Not warm to touch or erythematous, mild joint effusion present, no posterior knee fossa cyst.     EXAM: XR KNEE LEFT 1 OR 2 VWS  LOCATION: [facility]  DATE/TIME: 7/26/2019 3:03 PM     INDICATION: acute left knee pain  COMPARISON: None.     FINDINGS: Extensive chondrocalcinosis with severe narrowing at the patellofemoral compartment with minimal spur formation. Mild narrowing in the medial and lateral femoral compartments. Trace suprapatellar effusion. No fracture or dislocation.     Extensive arterial calcifications.    ASSESSMENT & PLAN:   Frank Robles is a 85 y.o. male presenting today for evaluation of acute onset left knee pain.     1. Acute pain of left knee  - XR Knee Left 1 or 2 VWS; Future     Discussed differential diagnosis of exacerbation of underlying OA vs gout/pseudogout vs less likely infected joint. Without trauma, do not suspect acute meniscus, ligamentous, or fracture injury.     X-ray obtained and reveals extensive chondrocalcinosis with severe narrowing patellofemoral compartment. Mild narrowing medial and lateral femoral compartments. Trace suprapatellar effusion.     Called Bernard to discuss x-ray results and recommended scheduled acetaminophen 1000mg Q8H PRN, ice 20 minutes three times a day, and rest. Discussed option of short term NSAIDS but was previously instructed to avoid due to cardiac comorbidities and interactions with other medications (Eliquis).     If symptoms are not improving with conservative management, consider burst of oral steroids or steroid injection. If acutely worsening, return for further  evaluation and possible aspiration.     RTC: 2 weeks - if not improving / sooner if worsening  Scheduled for follow up with cardiology in September 2019  If knee pain resolves, recommend annual wellness exam in June 2020.     Nevaeh Paul, DO

## 2021-05-30 NOTE — TELEPHONE ENCOUNTER
Recommendations  By Dr. Segundo noted and reviewed with patient's wife. Will have schedulers contact patient to set up appointment.      Rosita Hunt RN

## 2021-05-30 NOTE — TELEPHONE ENCOUNTER
Refill Approved    Rx renewed per Medication Renewal Policy. Medication was last renewed on 4/4/19.    Leslye Gillespie, [facility] Triage/Med Refill 6/27/2019     Requested Prescriptions   Pending Prescriptions Disp Refills     gabapentin (NEURONTIN) 300 MG capsule [Pharmacy Med Name: Gabapentin Oral Capsule 300 MG] 270 capsule 0     Sig: TAKE 1 CAPSULE BY MOUTH 3 TIMES PER DAY.  MAY TAKE 2 TO 3 CAPSULES BY MOUTH DAILY, DOES NOT NEED TO BE 3 SEPERATE TIMES.       Gabapentin/Levetiracetam/Tiagabine Refill Protocol  Passed - 6/26/2019 11:16 AM        Passed - PCP or prescribing provider visit in past 12 months or next 3 months     Last office visit with prescriber/PCP: 9/21/2017 Timbo Auguste MD OR same dept: Visit date not found OR same specialty: 10/26/2017 Dilshad Khan MD  Last physical: 6/14/2019 Last MTM visit: Visit date not found   Next visit within 3 mo: Visit date not found  Next physical within 3 mo: Visit date not found  Prescriber OR PCP: Timbo Auguste MD  Last diagnosis associated with med order: 1. Pain  - gabapentin (NEURONTIN) 300 MG capsule [Pharmacy Med Name: Gabapentin Oral Capsule 300 MG]; TAKE 1 CAPSULE BY MOUTH 3 TIMES PER DAY.  MAY TAKE 2 TO 3 CAPSULES BY MOUTH DAILY, DOES NOT NEED TO BE 3 SEPERATE TIMES.   Dispense: 270 capsule; Refill: 0    If protocol passes may refill for 12 months if within 3 months of last provider visit (or a total of 15 months).

## 2021-05-30 NOTE — TELEPHONE ENCOUNTER
Reviewed episode date and time with wife.   She reports [facility] and then came home.   Denies any awareness of NSVT.     Routed to Dr. Segundo for review of NSVT.     Bindu Mccurdy RN BSN PHN  Device Nurse   [facility]

## 2021-05-31 VITALS — HEIGHT: 70 IN | BODY MASS INDEX: 23.77 KG/M2 | WEIGHT: 166 LBS

## 2021-05-31 VITALS — HEIGHT: 70 IN | WEIGHT: 166 LBS | BODY MASS INDEX: 23.77 KG/M2

## 2021-05-31 VITALS — BODY MASS INDEX: 24.73 KG/M2 | HEIGHT: 69 IN | WEIGHT: 167 LBS

## 2021-05-31 VITALS — HEIGHT: 68 IN | WEIGHT: 164.7 LBS | BODY MASS INDEX: 24.96 KG/M2

## 2021-05-31 VITALS — WEIGHT: 166 LBS | BODY MASS INDEX: 25.16 KG/M2 | HEIGHT: 68 IN

## 2021-05-31 NOTE — TELEPHONE ENCOUNTER
Refill Approved    Rx renewed per Medication Renewal Policy. Medication was last renewed on 5/16/19.    Katherine Mattson, [facility] Triage/Med Refill 8/9/2019     Requested Prescriptions   Pending Prescriptions Disp Refills     mirtazapine (REMERON) 7.5 MG tablet [Pharmacy Med Name: Mirtazapine Oral Tablet 7.5 MG] 270 tablet 0     Sig: TAKE 1 TO 3 TABLETS BY MOUTH AT BEDTIME.       Tricyclics/Misc Antidepressant/Antianxiety Meds Refill Protocol Passed - 8/8/2019  9:52 AM        Passed - PCP or prescribing provider visit in last year     Last office visit with prescriber/PCP: 9/21/2017 Timbo Auguste MD OR same dept: 7/26/2019 Nevaeh Paul DO OR same specialty: 7/26/2019 Nevaeh Paul DO  Last physical: 6/14/2019 Last MTM visit: Visit date not found   Next visit within 3 mo: Visit date not found  Next physical within 3 mo: Visit date not found  Prescriber OR PCP: Timbo Auguste MD  Last diagnosis associated with med order: 1. Insomnia  - mirtazapine (REMERON) 7.5 MG tablet [Pharmacy Med Name: Mirtazapine Oral Tablet 7.5 MG]; TAKE 1 TO 3 TABLETS BY MOUTH AT BEDTIME.  Dispense: 270 tablet; Refill: 0    If protocol passes may refill for 12 months if within 3 months of last provider visit (or a total of 15 months).

## 2021-06-01 VITALS — HEIGHT: 68 IN | BODY MASS INDEX: 25.01 KG/M2 | WEIGHT: 165 LBS

## 2021-06-01 VITALS — WEIGHT: 167 LBS | BODY MASS INDEX: 25.31 KG/M2 | HEIGHT: 68 IN

## 2021-06-01 VITALS — WEIGHT: 169 LBS | HEIGHT: 70 IN | BODY MASS INDEX: 24.2 KG/M2

## 2021-06-01 NOTE — PROGRESS NOTES
In clinic device check with Device RN and follow-up with Dr. Segundo.  Please see link for full device report.  Patient was informed of results and next follow up during today's visit.

## 2021-06-01 NOTE — TELEPHONE ENCOUNTER
Noted.  Phone call to pharmacy, rx states lasix 20 mg 2 tablets twice a day and then pt takes one tablet a day.  They would like clarification.  Chart reviewed, pt was seen yesterday by Dr. Segundo and instructions state that patient should increase his lasix to 40 mg two times a day.  Phone call to patient and his wife, they clarify that  he had been taking 20 mg twice a day and understand that Dr. Segundo would like the patient to increase to 40 mg two times a day.  New rx sent to pharmacy.

## 2021-06-01 NOTE — PROGRESS NOTES
[facility] HEART [facility]  Arrhythmia Clinic  Dave Segundo    Referring:      Assessment:         Acute on chronic systolic heart failure: The patient demonstrates evidence of volume overload consistent with systolic heart failure and a relatively acute change in symptoms.  The patient's most recent echocardiogram from approximately 1 year ago demonstrated an LVEF of 41% and mild to moderate mitral regurgitation.  The patient warrants further assessment of his left ventricular systolic performance and valve status with rapid reassessment of medication changes in the heart failure clinic.  Nonsustained VT which was asymptomatic and been demonstrated on the patient's most recent remote device transmission.  Further recommendations based on the findings of his device interrogation.  The patient's presently has been pacing in the right ventricle 97% of the time and could be a contributor to his heart failure symptoms.    Persistent atrial flutter: The patient has had persistent atypical atrial flutter which prompted reprogramming of his pacemaker device to a VVIR pacing mode.  Surprisingly the patient today is out of his atrial flutter and in a junctional rhythm with periods of sinus bradycardia.  His pacemaker was reprogrammed to a DDD-R mode to reestablish AV synchrony.  These programming changes may ultimately help with this above heart failure symptoms.  I would not recommend any membrane active antiarrhythmic drug therapy at this time.    Essential hypertension: The patient's blood pressure is at target, but would afford further titration of his heart failure medications.    Recommendations:    Patient's pacemaker reprogrammed to a DDD- R pacing mode lower rate 60 bpm.    BMP and BNP laboratory sent today.    Lasix dose increased from 20 mg twice daily to 40 mg twice daily    Follow-up in the heart failure clinic with the heart failure NP in 7 days.    Echocardiogram to assess the patient's left  ventricular systolic performance and valve status.    Follow-up with heart failure cardiologist, Dr. David Hartman in the next 1-2 months.      Patient Active Problem List   Diagnosis     Dual Chamber Cardiac Devices Pacemaker Present     Adverse Effect Of Anticholinergics     Chronic Major Depression     Frequent, Full-bladder Emptying (Polyuria)     Constipation     Insomnia     Actinic Keratosis     Lipoma Of The Adipose Tissue     Hypertension     Coronary Artery Disease     Chronic systolic heart failure (H)     Persistent Atrial Fibrillation     Chronic Reflux Esophagitis     Ischemic cardiomyopathy     Sick sinus syndrome with tachycardia (H)     Pacemaker lead malfunction     Atrial flutter (H)     Dysarthria       Subjective:  Frank Robles (85 y.o. male) returns to the arrhythmia clinic for interval reevaluation of his rhythm and device status.  The patient his wife noticed a change in his clinical status starting about 2 weeks ago.  At that time the patient relates that he began to notice an increase in his weight.  He denies any ankle edema but does note that his abdomen is a bit more distended.  His appetite seemed fine but he cut back on his food intake because of the weight increase.  He was still riding his 3 wheeled battery assisted bicycle.  He reports some increased difficulty sleeping but denies orthopnea or PND.  The patient reports no other new changes in his general healthcare status.    Current Outpatient Medications   Medication Sig Dispense Refill     ELIQUIS 5 mg Tab tablet TAKE 1 TABLET BY MOUTH TWICE DAILY 180 tablet 1     furosemide (LASIX) 20 MG tablet Take 2 tablets (40 mg total) by mouth 2 (two) times a day at 9am and 6pm. Patient currently takes one daily 180 tablet 3     gabapentin (NEURONTIN) 300 MG capsule TAKE 1 CAPSULE BY MOUTH 3 TIMES PER DAY.  MAY TAKE 2 TO 3 CAPSULES BY MOUTH DAILY, DOES NOT NEED TO BE 3 SEPERATE TIMES.  (Patient taking differently: TAKE 1 CAPSULE BY MOUTH  "3 TIMES PER DAY.  MAY TAKE 2 TO 4 CAPSULES BY MOUTH DAILY, DOES NOT NEED TO BE 3 SEPERATE TIMES.) 270 capsule 3     MELATONIN ORAL Take by mouth.       metoprolol succinate (TOPROL-XL) 25 MG TAKE 2 TABLETS BY MOUTH DAILY 180 tablet 2     mirtazapine (REMERON) 7.5 MG tablet TAKE 1 TO 3 TABLETS BY MOUTH AT BEDTIME. 270 tablet 3     valACYclovir (VALTREX) 500 MG tablet 500 mg daily.        lisinopril (PRINIVIL,ZESTRIL) 20 MG tablet Take one PO Qday 90 tablet 3     No current facility-administered medications for this visit.        Review of Systems:   General: Weight Gain  Eyes: WNL  Ears/Nose/Throat: WNL  Lungs: Shortness of Breath  Heart: Shortness of Breath with activity  Stomach: WNL  Bladder: WNL  Muscle/Joints: WNL  Skin: WNL  Nervous System: Loss of Balance  Mental Health: WNL     Blood: WNL    Family History  Family History   Problem Relation Age of Onset     Heart attack Father 76       Social History   reports that he has never smoked. He has never used smokeless tobacco. He reports that he drinks about 0.6 oz of alcohol per week. He reports that he does not use drugs.    Objective:   Vital signs in last 24 hours:  /70 (Patient Site: Left Arm, Patient Position: Sitting, Cuff Size: Adult Regular)   Pulse 60   Resp 16   Ht 5' 8.5\" (1.74 m)   Wt 176 lb (79.8 kg)   BMI 26.37 kg/m    Weight: Weight: 176 lb (79.8 kg)     Physical Exam:  General: The patient is alert oriented to person place and situation.  The patient is in o acute distress at the time of my evaluation.  Eyes: Pupils are equal, round, and reactive to light.  Conjunctiva and sclera are clear.  ENT: Oral mucosa is moist and without redness. No evident nasal discharge.  Pulmonary: Lungs are notable for some mild decrease in breath sounds bilaterally in the bases.  There is some scant rales in the mid one third bilaterally.  I do not appreciate any rhonchi, or wheezes.    Cardiovascular exam: Rhythm is regular. S1 and S2 are normal.  There " is a 3/6 systolic murmur heard at the left ventricular apex.  I do not appreciate an S3 gallop.  JVP is elevated to the angle of jaw with the patient sitting 45 degrees.  Lower extremities demonstrate trace edema bilaterally.   Distal pulses are intact bilaterally.  Abdomen is mildly distended, soft, and nontender.  There is possible evidence of the liver edge below the right costal margin.  Musculoskeletal: Spine is straight. Extremities without deformity.  Neuro: Gait is normal.   Skin is warm, dry, and otherwise intact.      Cardiographics:   Interrogation of the patient's dual-chamber pacemaker demonstrates normal battery monitoring voltage.  The patient's right atrial right ventricular lead status are stable and normal.  Device diagnostics show resolution of the patient's atrial flutter and an underlying junctional rhythm or sinus bradycardia with rates in the 30s.  Right ventricular pacing is 97.4%.  There was a single high rate ventricular episode lasting 3 beats on June 7.    Lab Results:   Lab Results   Component Value Date     (L) 09/11/2019    K 5.0 09/11/2019    CL 92 (L) 09/11/2019    CO2 29 09/11/2019    BUN 24 09/11/2019    CREATININE 0.89 09/11/2019    CALCIUM 9.1 09/11/2019     Lab Results   Component Value Date    WBC 5.5 06/14/2019    WBC 6.8 09/08/2014    HGB 12.6 (L) 06/14/2019    HCT 37.4 (L) 06/14/2019    MCV 93 06/14/2019     06/14/2019     Lab Results   Component Value Date    INR 1.10 10/26/2017         Outside record review:

## 2021-06-01 NOTE — PROGRESS NOTES
[facility] Note    Name: Frank Robles  : 1934   MRN: [id number]    Frank Robles is a 85 y.o. male presenting to discuss the following:     CC:   Chief Complaint   Patient presents with     Cough       HPI:  Siting in evening after eating, listening to music or reading, has a cough where he muffles with mouth shut (clearing throat?). When he woke up this morning, lungs felt funny when breathing. Wife asked if rattled and he said maybe. Wanted to get in before the weekend. Has been clearing throat more in the mornings and last 5 days.    Seeing heart clinic later today.     ROS:   CONSTITUTIONAL: No fevers or chills. Appetite is poor. Sleeping okay.   HEENT: No rhinorrhea. No sore throat. No ear pain. No hoarse voice.   HEART: No chest pain.   LUNGS: Is short of breath, less stamina. No productive cough.   ABDOMEN: No nausea, no abdominal pain. No diarrhea. Denies history of reflux. No sore taste in throat.   : No urinary symptoms.   MSK: No myalgias or arthralgias.     PMH:   Patient Active Problem List   Diagnosis     Dual Chamber Cardiac Devices Pacemaker Present     Adverse Effect Of Anticholinergics     Chronic Major Depression     Frequent, Full-bladder Emptying (Polyuria)     Constipation     Insomnia     Actinic Keratosis     Lipoma Of The Adipose Tissue     Hypertension     Coronary Artery Disease     Chronic systolic heart failure (H)     Persistent Atrial Fibrillation     Chronic Reflux Esophagitis     Ischemic cardiomyopathy     Sick sinus syndrome with tachycardia (H)     Pacemaker lead malfunction     Atrial flutter (H)     Dysarthria       Past Medical History:   Diagnosis Date     Actinic keratosis      Acute posthemorrhagic anemia      Anemia      Anxiety disorder due to general medical condition      Atrial flutter (H)      CAD (coronary artery disease)      Cardiomyopathy (H)      CHF (congestive heart failure) (H)      Chronic reflux esophagitis      Chronic systolic heart  failure (H)     Created by [facility] Annotation: Mar 18 2013  1:29PM - SanyaDave: diastolic  Replacement Utility updated for latest IMO load     Constipation      Decubitus ulcer      Fatigue      Fatigue      Foot pain      Hemorrhoids      HTN (hypertension)      Insomnia      Lipoma     adipose tissue     Major depression, chronic      Pacemaker lead malfunction 9/8/2014     Pleural effusion      Polyuria      Slurred speech     few months after open heart     SSS (sick sinus syndrome) (H)      Transient global amnesia      Vasovagal syncope        PSH:   Past Surgical History:   Procedure Laterality Date     CARDIAC PACEMAKER PLACEMENT       MA ABLATE HEART DYSRHYTHM FOCUS      Description: Catheter Ablation Atrial Fibrillation;  Recorded: 02/05/2014;  Comments: PVI Aug 2009: (PVI-RF + roof line)     MA CABG, VEIN, SINGLE      Description: CABG (CABG);  Proc Date: 02/07/2013;  Comments: Dr. Jatinder Simpson at [facility]; ; Feb 2013: CAB X 3 (LIMA to LAD, SVG to OM, SVG to PDA); ; Reopened same day for RA bleeding     MA REMOVAL OF TONSILS,<13 Y/O      Description: Tonsillectomy;  Recorded: 03/26/2013;     TOTAL HIP ARTHROPLASTY      bilateral         MEDICATIONS:   Current Outpatient Medications on File Prior to Visit   Medication Sig Dispense Refill     ELIQUIS 5 mg Tab tablet TAKE 1 TABLET BY MOUTH TWICE DAILY 180 tablet 1     furosemide (LASIX) 20 MG tablet Take 2 tablets (40 mg total) by mouth 2 (two) times a day at 9am and 6pm. 360 tablet 3     gabapentin (NEURONTIN) 300 MG capsule TAKE 1 CAPSULE BY MOUTH 3 TIMES PER DAY.  MAY TAKE 2 TO 3 CAPSULES BY MOUTH DAILY, DOES NOT NEED TO BE 3 SEPERATE TIMES.  (Patient taking differently: TAKE 1 CAPSULE BY MOUTH 3 TIMES PER DAY.  MAY TAKE 2 TO 4 CAPSULES BY MOUTH DAILY, DOES NOT NEED TO BE 3 SEPERATE TIMES.) 270 capsule 3     MELATONIN ORAL Take by mouth.       metoprolol succinate (TOPROL-XL) 25 MG TAKE 2 TABLETS BY MOUTH DAILY 180 tablet 2      "mirtazapine (REMERON) 7.5 MG tablet TAKE 1 TO 3 TABLETS BY MOUTH AT BEDTIME. 270 tablet 3     valACYclovir (VALTREX) 500 MG tablet 500 mg daily.        lisinopril (PRINIVIL,ZESTRIL) 20 MG tablet Take one PO Qday 90 tablet 3     No current facility-administered medications on file prior to visit.        ALLERGIES:  Allergies   Allergen Reactions     Zolpidem Other (See Comments)     Hallucinations needed to be restrained in hospital     Beta-Blockers (Beta-Adrenergic Blocking Agts) Other (See Comments)     Fatigue if metoprolol > 12.5 mg     Diphenhydramine Hcl Other (See Comments)     tachycardia     SOCIAL HISTORY:   Social History     Tobacco Use     Smoking status: Never Smoker     Smokeless tobacco: Never Used   Substance Use Topics     Alcohol use: Yes     Alcohol/week: 0.6 oz     Types: 1 Cans of beer per week     Drug use: No       PHYSICAL EXAM:   /60   Pulse 63   Temp 98.2  F (36.8  C) (Oral)   Resp 16   Ht 5' 8.5\" (1.74 m)   Wt 174 lb 4 oz (79 kg)   SpO2 94%   BMI 26.11 kg/m     GENERAL: Bernard is a pleasant, elderly male, accompanied by wife, non-toxic appearing.   HEENT: Sclera white, no nasal discharge, tympanic membranes normal bilaterally, oropharynx pink and moist, no cervical lymphadenopathy.   HEART: Regular rate and rhythm, no murmurs.   LUNGS: Clear to auscultation bilaterally, unlabored.  ABDOMEN: Soft, non-tender to palpation.  EXTREMITIES: Trace lower extremity edema, pulses intact, normal capillary refill.     ASSESSMENT & PLAN:   Frank Robles is a 85 y.o. male presenting today for evaluation of slight cough/throat clearing, concerned about infection, wants to catch early before the weekend.     1. Throat clearing  At this point in time, I do not suspect pneumonia. No history of tobacco use, now risk for bronchitis. Discussed possibility of volume overload causing symptoms but recently started on Lasix and weight decreasing, so if related should resolve. Also discussed " possibility of silent reflux, but declines initiation of Zantac.     Offered CBC and x-ray, but based on history and clinical exam I do not suspect we will find anything. Bernard and wife are okay with deferring this work-up. May be early onset of viral infection, but discussed recommendation would be for OTC medications for symptomatic management. Will continue to monitor for now.     If symptoms are worsening, will return for further evaluation or present to walk in care over the weekend.    RTC: If symptoms are worsening / June 2020 - annual wellness exam with PCP    Nevaeh Paul, DO

## 2021-06-01 NOTE — TELEPHONE ENCOUNTER
----- Message from Jacqui Ibarra sent at 9/12/2019 10:28 AM CDT -----  Contact: [facility]  General phone call:    Caller: [facility]  Primary cardiologist: Dr. Segundo  Detailed reason for call: they are calling for clarification they received 2 Rx's with conflicting information for the Furosemide 20 mg.    New or active symptoms?   Best phone number: [phone]  Best time to contact: Anytime  Ok to leave a detailed message? Yes  Device? no    Additional Info:

## 2021-06-01 NOTE — PROGRESS NOTES
Patient and pt wife seen in clinic for HF education s/p recent hospital discharge 0-00-00.  Reviewed HF Binder that includes the  HF Sx Awareness & Action plan  handout and  A Stronger Pump  booklet and Weight log booklet highlighting : _X__Na management in diet  __X_importance of daily wts   Instructed patient in signs and sx of heart failure, reiterated when to call clinic - reviewed HF hotline # [phone] and after hours call # [phone].  Majority of time was spent reviewing: signs and symptoms of HF, HF action plan, and minimal low salt diet discussion.  Patient and wife verbalized understanding of HF discussion.  Pt has been eating a low salt diet for the past 6 years, he weighs himself daily, and stays physically active walking. Opportunity to ask questions and have them answered.   No formal f/u scheduled with nurse clinician for continued education - will continue to reinforce HF management education. SUZY,RN

## 2021-06-01 NOTE — PATIENT INSTRUCTIONS - HE
Frank Robles,    It was a pleasure to see you today at the [facility].     My recommendations after this visit include:  - You will be called with the results of your labs. We will discuss any medication changes and follow up at that time .   - Limit salt in your diet.   - Continue to weigh yourself daily.     Kelli Mendoza, CNP

## 2021-06-02 VITALS — WEIGHT: 171 LBS | HEIGHT: 69 IN | BODY MASS INDEX: 25.33 KG/M2

## 2021-06-02 VITALS — WEIGHT: 169 LBS | BODY MASS INDEX: 25.03 KG/M2 | HEIGHT: 69 IN

## 2021-06-02 NOTE — PATIENT INSTRUCTIONS - HE
Frank Robles,    It was a pleasure to see you today at the [facility].     My recommendations after this visit include:  - You will be called with the results of your labs.   - Continue to weigh yourself daily.   - Our clinic will call to schedule your device upgrade.   - You are due to see Dr. Hartman in November, please schedule.     Kelli Mendoza, CNP

## 2021-06-02 NOTE — PROGRESS NOTES
7/17/1934  Home:[phone] (home) Cell:[phone] (mobile)  Emergency Contact: Dedra Robles [phone]    +++Important patient information for CSC/Cath Lab staff : None+++    [facility] EP Cath Lab Procedure Order     Device Implant/Revision:  Procedure: Upgrade to CRT-P, from a dual PPM  Device Type: Dual Pacemaker  Device Company/Device Rep Needed for Procedure: [employer]    Diagnosis:  Cardiomyopathy  Anticipated Case Duration:  Standard  Scheduling Needs/Timeframe:  Next Available  Anesthesia:  None  Research Protocol:  No    [facility] EP Cath Lab Prep   Ordering Provider: Dr Segundo  Ordering Date: 10/9/2019  Orders Status: Intial order placed and Order set placed    H&P:  Compled by Kelli OROZCO on 9/19/19 if scheduled within 30 days, pt to schedule with PMD if procedure outside of this timeframe  PCP: Timbo Auguste MD, [phone]    Pre-op Labs: Ordered AM of procedure    Medical Records Pertinent for Procedure:  Echo 9/13/19, EKG 9/8/14 and Device Implant Record 9/8/14    Patient Education:    Teach with Patient: Will be completed via phone prior to procedure.    Risks Reviewed:     Pacemaker Insertion    <1% for each of the following:  infection, bleeding, hematoma, pneumothorax, subclavian vein thrombosis, cardiac perforation, cardiac tamponade, arrhythmias, pectoral or diaphragmatic stimulation, air embolus, pocket erosion, device interactions.    <4% lead dislodgment, <1% lead fracture or generator  malfunction.    <0.5% CVA or MI.    <0.1% death.    If external defibrillation or CV is needed, 25% risk for superficial burn.    For patients on anticoagulation, the risk of bleeding, hematoma and tamponade are increased.      Biventricular Implants  In addition to standard risks for ICD or pacemaker implant, there is:    90% success of LV lead placement.    10%  dislodgment (LV lead)    <3% risk venous rupture, cardiac tamponade    Patient counseled re: options if LV lead placement is not feasible  transvenously.      Consent: Will be obtained in [facility] day of procedure    Pre-Procedure Instructions that were Reviewed with Patient:  NPO after midnight, Remove all jewelry prior to coming in for procedure, Shower prior to arrival, Notified patient of time and date of procedure by CV , Transportation arrangements needed s/p procedure, Post-procedure follow up process, Sedation plan/orders and Pre-procedure letter was sent to pt by CV     Pre-Procedure Medication Instructions:  Instructions given to pt regarding anticoagulants: Eliquis- instructed to continue anticoagulation uninterrupted through their procedure  Instructions given to pt regarding antiarrhythmic medication: N/A for this type of procedure; N/A  Instructions for medication, other than anticoagulants/antiarrhythmics listed above, given to pt: to take all morning medications with small sips of water, with the exception of OTC supplements and MVI      Allergies   Allergen Reactions     Zolpidem Other (See Comments)     Hallucinations needed to be restrained in hospital     Beta-Blockers (Beta-Adrenergic Blocking Agts) Other (See Comments)     Fatigue if metoprolol > 12.5 mg     Diphenhydramine Hcl Other (See Comments)     tachycardia       Current Outpatient Medications:      ELIQUIS 5 mg Tab tablet, TAKE 1 TABLET BY MOUTH TWICE DAILY, Disp: 180 tablet, Rfl: 1     furosemide (LASIX) 20 MG tablet, Take 2 tablets (40 mg total) by mouth 2 (two) times a day at 9am and 6pm., Disp: 360 tablet, Rfl: 3     gabapentin (NEURONTIN) 300 MG capsule, TAKE 1 CAPSULE BY MOUTH 3 TIMES PER DAY.  MAY TAKE 2 TO 3 CAPSULES BY MOUTH DAILY, DOES NOT NEED TO BE 3 SEPERATE TIMES.  (Patient taking differently: TAKE 1 CAPSULE BY MOUTH 3 TIMES PER DAY.  MAY TAKE 2 TO 4 CAPSULES BY MOUTH DAILY, DOES NOT NEED TO BE 3 SEPERATE TIMES.), Disp: 270 capsule, Rfl: 3     MELATONIN ORAL, Take by mouth., Disp: , Rfl:      metoprolol succinate (TOPROL-XL) 25 MG, TAKE 2 TABLETS  BY MOUTH DAILY, Disp: 180 tablet, Rfl: 2     mirtazapine (REMERON) 7.5 MG tablet, TAKE 1 TO 3 TABLETS BY MOUTH AT BEDTIME., Disp: 270 tablet, Rfl: 3     valACYclovir (VALTREX) 500 MG tablet, 500 mg daily. , Disp: , Rfl:     Documentation Date:10/9/2019 7:27 AM  Sarah Smith RN

## 2021-06-02 NOTE — TELEPHONE ENCOUNTER
Medication Question or Clarification  Who is calling: Patient's spouse, Dominique  What medication are you calling about? (include dose and sig) Gabapentin 300 mg, up to 3 capsules per day  Who prescribed the medication?: Timbo Auguste MD   What is your question/concern?:  Patient now has home health care that loads a medication dispensing machine for Bernard.  Bernard does not have enough medication to fill the box, next refill date in 10/24/19.  Patient is asking Dr. Auguste to call [facility] pharmacy to release the medication today so home care can fill his medication box this weekend.  Please let Dominique know when this is done.  Pharmacy: [facility] #[id number]  Okay to leave a detailed message?: Yes  Site CMT - Please call the pharmacy to obtain any additional needed information.

## 2021-06-03 VITALS
OXYGEN SATURATION: 94 % | DIASTOLIC BLOOD PRESSURE: 60 MMHG | SYSTOLIC BLOOD PRESSURE: 108 MMHG | RESPIRATION RATE: 16 BRPM | HEART RATE: 63 BPM | WEIGHT: 174.25 LBS | HEIGHT: 69 IN | TEMPERATURE: 98.2 F | BODY MASS INDEX: 25.81 KG/M2

## 2021-06-03 VITALS — BODY MASS INDEX: 25.22 KG/M2 | HEIGHT: 69 IN | WEIGHT: 170.31 LBS

## 2021-06-03 VITALS
DIASTOLIC BLOOD PRESSURE: 54 MMHG | HEART RATE: 72 BPM | RESPIRATION RATE: 16 BRPM | WEIGHT: 171 LBS | HEIGHT: 69 IN | BODY MASS INDEX: 25.33 KG/M2 | SYSTOLIC BLOOD PRESSURE: 100 MMHG

## 2021-06-03 VITALS
SYSTOLIC BLOOD PRESSURE: 104 MMHG | HEART RATE: 68 BPM | OXYGEN SATURATION: 92 % | DIASTOLIC BLOOD PRESSURE: 52 MMHG | WEIGHT: 161 LBS | BODY MASS INDEX: 23.85 KG/M2 | RESPIRATION RATE: 14 BRPM | HEIGHT: 69 IN

## 2021-06-03 VITALS
DIASTOLIC BLOOD PRESSURE: 70 MMHG | HEART RATE: 60 BPM | RESPIRATION RATE: 16 BRPM | WEIGHT: 176 LBS | SYSTOLIC BLOOD PRESSURE: 120 MMHG | HEIGHT: 69 IN | BODY MASS INDEX: 26.07 KG/M2

## 2021-06-03 VITALS — WEIGHT: 170 LBS | BODY MASS INDEX: 25.18 KG/M2 | HEIGHT: 69 IN

## 2021-06-04 VITALS
WEIGHT: 172 LBS | HEART RATE: 72 BPM | DIASTOLIC BLOOD PRESSURE: 58 MMHG | BODY MASS INDEX: 25.4 KG/M2 | SYSTOLIC BLOOD PRESSURE: 100 MMHG | RESPIRATION RATE: 20 BRPM

## 2021-06-04 VITALS
WEIGHT: 162 LBS | BODY MASS INDEX: 23.75 KG/M2 | HEART RATE: 72 BPM | DIASTOLIC BLOOD PRESSURE: 70 MMHG | RESPIRATION RATE: 16 BRPM | SYSTOLIC BLOOD PRESSURE: 120 MMHG

## 2021-06-04 VITALS
DIASTOLIC BLOOD PRESSURE: 76 MMHG | BODY MASS INDEX: 25.62 KG/M2 | HEART RATE: 76 BPM | SYSTOLIC BLOOD PRESSURE: 118 MMHG | HEIGHT: 69 IN | RESPIRATION RATE: 16 BRPM | WEIGHT: 173 LBS

## 2021-06-04 VITALS
BODY MASS INDEX: 23.55 KG/M2 | SYSTOLIC BLOOD PRESSURE: 110 MMHG | HEART RATE: 84 BPM | DIASTOLIC BLOOD PRESSURE: 60 MMHG | WEIGHT: 159 LBS | HEIGHT: 69 IN | RESPIRATION RATE: 16 BRPM | TEMPERATURE: 98.2 F

## 2021-06-04 VITALS — HEIGHT: 69 IN | WEIGHT: 150.6 LBS | BODY MASS INDEX: 22.31 KG/M2

## 2021-06-04 VITALS
BODY MASS INDEX: 25.33 KG/M2 | SYSTOLIC BLOOD PRESSURE: 124 MMHG | DIASTOLIC BLOOD PRESSURE: 70 MMHG | WEIGHT: 171 LBS | HEART RATE: 84 BPM | RESPIRATION RATE: 16 BRPM | HEIGHT: 69 IN

## 2021-06-04 VITALS — BODY MASS INDEX: 22.51 KG/M2 | WEIGHT: 152.4 LBS

## 2021-06-04 NOTE — PATIENT INSTRUCTIONS - HE
Pacemaker Post-operative Checklist      The Device Registered Nurse (RN) reviewed the pacemaker function.      The Device RN did a wound assessment and wound care teaching.    Please call the Device Nurses with any signs of infection or questions regarding wound healing. Device Nurse Line: [phone], Option #3      The Device RN demonstrated and displayed the specific remote monitoring system for your pacemaker.      The Device RN reviewed the Partnership Agreement Form.    Patient Instructions    Do not lift your LEFT arm above the shoulder height, perform any vigorous arm movements such as swimming, golfing, washing windows, shoveling show, vacuuming or lifting greater than 10-15lbs with the affected arm for four weeks from the date of implant. The last December 30th.       To reduce the risk of infection, try to avoid any dental procedures for the first 6 weeks after your pacemaker implant. If you have an emergent or urgent dental need during this time, contact the device clinic for a prescription for an antibiotic. January 13th.       You will receive a device identification (ID) card in the mail from the device  within 6 weeks to replace the temporary ID card you were given in the hospital.      You may travel by any mode of transportation; just show your pacemaker ID card. You may be subject to a hand search or use of a handheld wand, but official should not keep the wand over the implant site for greater than 5-10 seconds.      For any surgery, let your doctor know you have a pacemaker.       Your pacemaker is NOT MRI safe -- due to abandoned leads on right.       Most household appliances, including a microwave, will not interfere with your pacemaker function. If you suspect interference, simply move away from the source. Cell phones do not cause a problem. Please refer to the device booklet from the  or their website under the section on electromagnetic interference (REEMA) for  further guidelines on things that may interfere with your pacemaker.       Device Clinic Contact Information  Device Nurses: [phone], Option #3    Device Remote Specialists: [phone], Option #2. For questions about your Remote Transmission or Transmission Schedule  Device Schedulers: [phone], Option #1

## 2021-06-04 NOTE — PROGRESS NOTES
DEVICE CLINIC RN POST-OP NOTE    Reason for visit: Post-op biventricular pacemaker check and wound assessment     Device: Medtronic Precepta Quad CRT-P  Procedure date: 12/2/19  Implant Diagnosis: AVB, CHB, SSS, AF  Implanting Physician: Dr. Dave Segundo    Assessment  Subjective: Bernard is feeling well since his upgrade to a CRT-P. No significant complaints or concerns expressed. See physical assessment in Homa Johnson CNP, note.   Incision/device pocket: The steri-strips were removed from the incision and it was cleansed with adhesive remover and alcohol wipes. The incision is clean, dry and intact. There are no signs of infection present.      Device Findings  Interrogation of device reveals normal sensing and capture thresholds  See the Paceart Report for a full summary of the device information.        Patient Education  Frank Robles was accompanied today by his son and wife.     [facility]'s Partnership Agreement for Device Patients and Post-operative Checklist were presented and reviewed with patient at today's appointment.    Signs and symptoms of infection, poor wound healing, and device function were reviewed. Range of motion and weight restrictions for the left side are four weeks. He was issued a Carelink remote monitor and instructed on its set-up and use for remote monitoring by the Med[employer] representative prior to hospital discharge. These instructions were reviewed with the patient at today s visit.       Plan  Remote on 1/8/19  Return in 3 months    Sandra Yanez RN BSN  [facility]

## 2021-06-04 NOTE — PATIENT INSTRUCTIONS - HE
Frank Robles,    It was a pleasure to see you today at [facility].     My recommendations after this visit include:  - Please follow up with Dr Hartman in May   - Please follow up with Kelli Mendoza in 2 months  - No changes to your medications    Homa Johnson, CNP

## 2021-06-05 VITALS
TEMPERATURE: 97.2 F | DIASTOLIC BLOOD PRESSURE: 58 MMHG | BODY MASS INDEX: 22.65 KG/M2 | WEIGHT: 153.4 LBS | RESPIRATION RATE: 20 BRPM | HEART RATE: 68 BPM | SYSTOLIC BLOOD PRESSURE: 103 MMHG | OXYGEN SATURATION: 91 %

## 2021-06-05 VITALS
TEMPERATURE: 97.3 F | WEIGHT: 149.6 LBS | BODY MASS INDEX: 22.09 KG/M2 | OXYGEN SATURATION: 96 % | HEART RATE: 64 BPM | RESPIRATION RATE: 24 BRPM | SYSTOLIC BLOOD PRESSURE: 100 MMHG | DIASTOLIC BLOOD PRESSURE: 49 MMHG

## 2021-06-05 VITALS
HEART RATE: 72 BPM | BODY MASS INDEX: 22.66 KG/M2 | DIASTOLIC BLOOD PRESSURE: 65 MMHG | SYSTOLIC BLOOD PRESSURE: 117 MMHG | WEIGHT: 153 LBS | HEIGHT: 69 IN | TEMPERATURE: 97.2 F

## 2021-06-05 VITALS
SYSTOLIC BLOOD PRESSURE: 90 MMHG | DIASTOLIC BLOOD PRESSURE: 60 MMHG | OXYGEN SATURATION: 96 % | WEIGHT: 154 LBS | BODY MASS INDEX: 22.74 KG/M2 | HEART RATE: 65 BPM | RESPIRATION RATE: 14 BRPM

## 2021-06-05 VITALS — BODY MASS INDEX: 22.66 KG/M2 | HEIGHT: 69 IN | WEIGHT: 153 LBS

## 2021-06-05 VITALS
SYSTOLIC BLOOD PRESSURE: 110 MMHG | WEIGHT: 155.2 LBS | OXYGEN SATURATION: 94 % | HEIGHT: 69 IN | DIASTOLIC BLOOD PRESSURE: 60 MMHG | HEART RATE: 65 BPM | BODY MASS INDEX: 22.99 KG/M2

## 2021-06-05 VITALS
HEART RATE: 74 BPM | WEIGHT: 157 LBS | SYSTOLIC BLOOD PRESSURE: 110 MMHG | HEIGHT: 69 IN | BODY MASS INDEX: 23.25 KG/M2 | DIASTOLIC BLOOD PRESSURE: 58 MMHG | RESPIRATION RATE: 16 BRPM

## 2021-06-05 NOTE — TELEPHONE ENCOUNTER
Type: routine one month post-op remote CRT-P upgrade.  Presenting rhythm: appears atrial flutter with biV pacing, rate 78 pm.  Battery/lead status: stable  Arrhythmias: since 12/20/19, 38 mode switches, EGMs confirm atrial flutter with occasional functional atrial undersensing, possibly single episode in progress since 1/4/20, burden 21%, V-rates >/=120 bpm ~2%. No ventricular high rate episodes detected.  Anticoagulant: apixaban  Comments: normal pacemaker function. BiV pacing 98.6%. Routed to device RN. prd        Since last AF episode on 12/20/19 patient had returned back to NSR, but now back in AF since 1/4/20. V-rates controlled while in AF. On Eliquis. Hx of persistent A. Flutter in past and at one point was programmed VVIR but subsequently found to have spontaneously converted at last OV. Device was reprogrammed to Dual chamber mode for AV synchrony.       Call placed to patient to review/assess. LVM for callback.     Rosita Hunt RN

## 2021-06-05 NOTE — TELEPHONE ENCOUNTER
Wife, Dominique, Called back today. States he has not complained of any troubles or awareness of rhythm changes in last couple days. Confirms Eliquis compliance. Advised to call with any prolonged palpitations, increasing fatigue, shortness of breath, edema etc. She verbalized understanding.     Will continue routine monitoring for now as this is not a new rhythm change for patient, v-rates are controlled, and is asymptomatic.  Rosita Hunt RN

## 2021-06-06 NOTE — TELEPHONE ENCOUNTER
The large area of bruising is more likely related to the fact that he is taking Eliquis.  That is more likely than potassium.  However, I do recommend that he follow-up with the provider prescribing doctor if there are significant concerns.

## 2021-06-06 NOTE — TELEPHONE ENCOUNTER
Orders being requested: home care skilled nursing  Reason service is needed/diagnosis: medication, education and setup  When are orders needed by: asazafar  Where to send Orders: Phone:  [phone] and [facility] - Manager - Rickey Cano  Okay to leave detailed message?  Yes

## 2021-06-06 NOTE — PATIENT INSTRUCTIONS - HE
Frank Robles,    It was a pleasure to see you today at the [facility].     My recommendations after this visit include:  - No changes to your medications.    - You are due to see Dr. Hartman in May. See me in 6 months.     - Please call my nurse Eliz if you have any concerns: [phone]    Kelli Mendoza, CNP

## 2021-06-06 NOTE — TELEPHONE ENCOUNTER
----- Message from Padmini Hendrickson MD sent at 3/9/2020  2:58 PM CDT -----  Kale Wellington;  Can you contact Mr. Robles and update him regarding the test results? Please have him take 40 mEq of potassium x2 days and then repeat lab work on Thursday.     Thanks;  ~ Padmini

## 2021-06-06 NOTE — TELEPHONE ENCOUNTER
I have no problem entering an order.  However, I have not seen him within the past 6 months and I believe that a face-to-face visit is required.  There is no point in me entering an order if that will not suffice.

## 2021-06-06 NOTE — TELEPHONE ENCOUNTER
Contacted Al the patient's son today to discuss Bernard's response to the increased diuretic use.  He reports using   Torsemide 50mg daily   And the Metolazone 2.5mg x2 two days Friday and Saturday     They went in for labs today.they are currently pending.    Al reports that they weighed his father today both at home and at the clinic to confirm the wt. Of 153#  This is down significantly from his clinic wt on 3/5/20  These wts are fully clothed and with shoes.     He is looking thin, not much peripheral swelling noted at all.He was able to walk into the clinic with his cane today without too much difficulty, ( no stopping for shortness of breath) He has been up multiple times in the night to pass urine, but no problems sleeping.     They are concerned this is a lot of wt loss response. They were advised that we will review labs and let them know of any new recommendations at that time. Sk/RN    DOUGLASI to Dr. Hendrickson

## 2021-06-06 NOTE — TELEPHONE ENCOUNTER
Al was contacted today with lab results and recommendations for Bernard. He was advised of the low Potassium and the recommendation to take 20meq twice a day x 2 days then recheck labs on Thursday , orders placed sk/RN

## 2021-06-06 NOTE — TELEPHONE ENCOUNTER
Dr. Auguste,  Spoke to Rickey from [facility] home care. They would like pt to have a home care order through [facility] for medication education and set up, so it can be processed through Medicare for him.  She states if they are seeing the pt for this, he will be getting charged whenever they have to go out for med set up.  If billed through medicare, should be covered for him.  Please enter order for home care, skilled nurse visit.

## 2021-06-06 NOTE — TELEPHONE ENCOUNTER
----- Message from Padmini Hendrickson MD sent at 3/5/2020 11:06 AM CST -----  Regarding: Follow-up  Kale Wellington;  Can you please give Mr. Robles's wife a call on Monday, March 9th, 2020 to follow-up on his fluid status as well make sure that he gets repeat lab work. If his wife is not available to discuss, then please give his son a call at Son Pamela) @ [phone]?    Thanks;  ~ Padmini

## 2021-06-06 NOTE — TELEPHONE ENCOUNTER
----- Message from Kelli Mendoza CNP sent at 3/17/2020 12:00 PM CDT -----  Potassium most likely dropped due to dose of metolazone on 3/5. Potassium now normal.  Continue normal dose of potassium and high potassium foods. Seeing PCP next week and PCP can order labs.

## 2021-06-06 NOTE — TELEPHONE ENCOUNTER
Medication Question or Clarification  Who is calling: Spouse  What medication are you calling about (include dose and sig)?: Potassium 20 MEQ  Who prescribed the medication?: Dr. Hendrickson [facility]  What is your question/concern?: Patient spouse is calling for the patient to request advise on whether the new potassium he is taking could be could large bruise on his arms.   Requested Pharmacy: [facility]  Okay to leave a detailed message?: Yes

## 2021-06-06 NOTE — TELEPHONE ENCOUNTER
"----- Message from Camille Werner sent at 3/12/2020  9:14 AM CDT -----  Regarding: Rx questions from family  Caller: Carlo (son)    Primary cardiologist: Dr. Hartman    Detailed reason for call: Carlo stated that his parents are having trouble adjusting and/or making changes to Bernard's daily medications, for instance, his mother has discarded Bernard's Potassium Chloride tabs.    Carlo said family is helping manage Bernard's Rx(s) intake now, but are asking for guidance and resources such as a weekly phone \"visit\" from an RN just to make sure everyone is on track. What does Dr. Hartman recommend? Please call back.     Best phone number: [phone]    Best time to contact: today    Ok to leave a detailed message? Y    Device? Yes    "

## 2021-06-06 NOTE — TELEPHONE ENCOUNTER
Spoke to patient's wife Dedra. She called to provide update that patient, per recommendation of Device RN yesterday, is scheduled to be seen in [facility] on 3/5. Patient has had increase in shortness of breath, fatigue, decrease in appetite and weight gain (although weight is documented as stable since 2/17/20 in our chart). Dedra wanted to make sure Dr. Hartman was aware and also requested direct contact information for MATT RN-- which was provided to her. She had no other questions or concerns they plan to keep [facility] appointment on 3/5/20. -shital

## 2021-06-06 NOTE — PATIENT INSTRUCTIONS - HE
Thank you for allowing the [facility] clinic to be a part of your care. Please pay close attention to the following medications as they have been changed during this visit.    1.) Please stop taking furosemide (Lasix)    2.) Please start taking torsemide 50 mg (half of a 100 mg tablet) one time daily in the morning    3.) Please take Metolazone 2.5 mg one time daily for ONLY 2 DAYS with your torsemide medication.    4.) Please have repeat blood work on Monday, March 9th. The order has been placed.     5.) [facility] will call on Monday to follow-up.

## 2021-06-06 NOTE — TELEPHONE ENCOUNTER
Patient's son advised of lab results and care facility as well. Potassium Chloride 10meq two times a day continued following the increased supplement dosing for 4 days. sk/RN

## 2021-06-06 NOTE — TELEPHONE ENCOUNTER
----- Message from Jacqui Ibarra sent at 3/9/2020 10:33 AM CDT -----  General phone call:    Caller: Al Miller  Primary cardiologist: Dr. Hartman  Detailed reason for call: Paul is calling about CHF weight questions  New or active symptoms?   Best phone number: Paul Melendez [phone]  Best time to contact: Anytime  Ok to leave a detailed message? yes  Device? no    Additional Info:

## 2021-06-06 NOTE — TELEPHONE ENCOUNTER
Received message from patient wife Dedra requesting call back to discuss bruising.     Call back placed to patient wife-- unable to reach. Left message requesting call back. -shreyab

## 2021-06-06 NOTE — TELEPHONE ENCOUNTER
Please assist with the nutritionist referral.  However, I would recommend waiting for the coronoavirus epidemic to quiet down before following up.

## 2021-06-06 NOTE — TELEPHONE ENCOUNTER
Return call to patient son. Offered to enroll patient in [employer]. Son feels that this would be too confusing for patient and his wife. Therefore, advised to contact PCP to discuss initiating Home Care. -shital

## 2021-06-06 NOTE — TELEPHONE ENCOUNTER
----- Message from Nereida Anthony sent at 3/3/2020 11:07 AM CST -----      Caller: Wife    Primary cardiologist: Dr. Hartman    Detailed reason for call: Patient's wife stated that her  is becoming more fatigued, shortness of breath and gaining weight. He does have a [facility] appointment for 3/5/2020 but wife wants to discuss this too. Please advise    Best phone number: [phone]  Best time to contact: today  Ok to leave a detailed message? yes  Device? yes

## 2021-06-07 NOTE — TELEPHONE ENCOUNTER
I entered a referral for this patient and will for her wife as well so you can call regarding both tasks at the same time.  I am not sure if they are able to do virtual visits but I do recommend providing the number for nutritional services.

## 2021-06-07 NOTE — TELEPHONE ENCOUNTER
I called and talked with Al. His dad has congestive heart failure. He needs to be on a low sodium diet and he is loosing weight.

## 2021-06-07 NOTE — PROGRESS NOTES
"Frank Robles is a 85 y.o. male who is being evaluated via a billable telephone visit.      The patient has been notified of following:     \"This telephone visit will be conducted via a call between you and your physician/provider. We have found that certain health care needs can be provided without the need for a physical exam.  This service lets us provide the care you need with a short phone conversation.  If a prescription is necessary we can send it directly to your pharmacy.  If lab work is needed we can place an order for that and you can then stop by our lab to have the test done at a later time.    If during the course of the call the physician/provider feels a telephone visit is not appropriate, you will not be charged for this service.\"         Frank Robles complains of    Chief Complaint   Patient presents with     Consult     Discuss need for home health care/skilled nursing for medication assitance       I have reviewed and updated the patient's Past Medical History, Social History, Family History and Medication List.    ALLERGIES  Zolpidem; Beta-blockers (beta-adrenergic blocking agts); and Diphenhydramine hcl    Additional provider notes:     This is a pleasant 85-year-old male who was cared for during a phone visit given the COVID-19 epidemic.  Initially, the appointment was set up as a face-to-face visit was required for home health care.  However, given the epidemic it is hoped that a phone visit will suffice under the circumstances.  The patient is homebound given his health conditions which will be discussed below.    He is a pleasant gentleman with a well-known history of an ischemic cardiomyopathy with previous coronary artery bypass graft surgery.  He has a history of atrial flutter followed by cardiology previously at the [facility] and now with [facility].  He has a history of sick sinus syndrome and has had a pacemaker placement.  He also had a previous surgery which was " complicated by left atrial appendage laceration which required a second operation.  He developed a left phrenic neuropathy.  He has a history of atrial fibrillation with previous pulmonary vein isolation procedure as well.  He is treated with Eliquis as well as metoprolol.  In December, 2019 he had an upgrade to a CRT-P and has followed up with cardiology.  At the last visit he was felt to be hypervolemic and had an adjustment to his diuretics with a bump in metolazone as well as torsemide.  In September of 2018 he had an echocardiogram showing ejection fraction of 51%.  There is mild mitral regurgitation noted.    His medical history is otherwise notable for hypertension as well as skin cancer.  He has followed up with dermatology on a consistent basis and did have a squamous cell carcinoma lesion removed this past year.  He has been compliant with his medications.    In general, he is weak.  He has required home health care as he is considered homebound.  He walks with the aid of a walker and is very challenging, even prior to the COVID-19 epidemic, for him to be able to go places.  He primarily has been staying home for months.  He does require some assistance with food preparation.  He is able to get dressed and bathe himself at this point but does need assistance otherwise.  He has been attempting to walk down the hallway three times per day.  Otherwise spends a lot of time in a recliner or at the table doing puzzles.    His wife reports there have been some memory concerns.  He can be more forgetful and she will write things down for him.  She notes his energy level has been low.  He has been monitoring his weight closely.  He states that he has not had any breathing problems.  He denies chest pain, orthopnea, or paroxysmal nocturnal dyspnea.    He did have laboratory testing in early March and at one point his potassium level was low and this was replaced.  His more recent electrolytes were normal and his  creatinine was of elevated to 1.31 with a GFR 52.    His mood has been stable.  His wife and has been helpful though she has a fracture in her back and is required home health care as well.       Assessment/Plan:  1. Chronic heart failure with preserved ejection fraction (H)  2. Atherosclerosis of native coronary artery of native heart without angina pectoris  3. Complete AV block due to AV amira ablation (H)    Continue plan per cardiology  Recommend monitoring daily weight  Torsemide was increased to 50 mg daily  He has taken metolazone as needed under the direction of cardiology  Continue metoprolol    4. Generalized muscle weakness    Reviewed his health history  Patient does require home health care services including assistance with activities of daily living  He does walk with the aid of a walker    5. Permanent atrial fibrillation    Continue Eliquis  He has been treated with Metroprolol  Follow-up with cardiology    6. Hypertension    Continue with trouble on  Monitor blood pressure    Reviewed the plan with the patient and with his wife as well as the healthcare worker providing assistance  Follow-up as needed      Phone call duration:  18 minutes    NAHID Crawford MD

## 2021-06-07 NOTE — PROGRESS NOTES
"Frank Robles is a 85 y.o. male who is being evaluated via a billable telephone visit.      The patient has been notified of following:     \"This telephone visit will be conducted via a call between you and your physician/provider. We have found that certain health care needs can be provided without the need for a physical exam.  This service lets us provide the care you need with a short phone conversation.  If a prescription is necessary we can send it directly to your pharmacy.  If lab work is needed we can place an order for that and you can then stop by our lab to have the test done at a later time.    If during the course of the call the physician/provider feels a telephone visit is not appropriate, you will not be charged for this service.\"     Patient has given verbal consent to a Telephone visit? Yes    Frank Robles complains of    Chief Complaint   Patient presents with     Constipation     x last week       I have reviewed and updated the patient's Past Medical History, Social History, Family History and Medication List.    ALLERGIES  Zolpidem; Beta-blockers (beta-adrenergic blocking agts); and Diphenhydramine hcl    Additional provider notes  85-year-old gentleman being evaluated for complaints of constipation.  He is being evaluated via telephone due to the current coronavirus crisis.  He and his wife are on the phone today.  They report that he has for the last 4 to 5 days had problems with constipation.  He has had very little in the way of bowel movements.  He is having more abdominal discomfort, particularly when trying to have a bowel movement.  He has had a history of constipation in the past but this has been more prominent over the last 4 to 5 days.  No fevers, nausea, or vomiting.  No blood in his stools.  No recent change in his diet.  I reviewed his past medical history.  They have tried one half dose of MiraLAX 1-2 times a day and Colace which have not been helpful.    Past Medical " History:   Diagnosis Date     Actinic keratosis      Acute posthemorrhagic anemia      Anemia      Anxiety disorder due to general medical condition      Atrial fibrillation (H)      Atrial flutter (H)      Biventricular cardiac pacemaker in situ     Previous right-sided implant  Medtronic [id number] [employer]vis[employer] DR CARBAJAL DOI: 9/8/2014 Upgrade to CRT-P: Dec 2, 2019     CAD (coronary artery disease)      Cardiomyopathy (H)      CHF (congestive heart failure) (H)      Chronic reflux esophagitis      Chronic systolic heart failure (H)     Created by [facility] [employer] [facility] Annotation: Mar 18 2013  1:29PM - Dave Segundo: diastolic  Replacement Utility updated for latest IMO load     Constipation      Decubitus ulcer      Fatigue      Fatigue      Foot pain      Hemorrhoids      HTN (hypertension)      Insomnia      Lipoma     adipose tissue     Major depression, chronic      Pacemaker lead malfunction 9/8/2014     Pleural effusion      Polyuria      Slurred speech     few months after open heart     SSS (sick sinus syndrome) (H)      Stroke (H)      Third degree AV block (H) 12/2/2019     Transient global amnesia      Vasovagal syncope      Current Outpatient Medications on File Prior to Visit   Medication Sig Dispense Refill     diphenhydrAMINE-acetaminophen (TYLENOL PM)  mg Tab Take 1 tablet by mouth at bedtime as needed.       ELIQUIS 5 mg Tab tablet TAKE 1 TABLET (5 mg) BY MOUTH 2 times a day 180 tablet 1     gabapentin (NEURONTIN) 300 MG capsule Take 3 capsules (900 mg total) by mouth at bedtime.       gabapentin (NEURONTIN) 300 MG capsule Take 1 capsule (300 mg total) by mouth every morning.       MELATONIN ORAL Take 3 mg by mouth at bedtime. Takes 1/4 tablet       metOLazone (ZAROXOLYN) 2.5 MG tablet Take 1 tablet (2.5 mg total) by mouth as needed. Take when increse of 2-3 lb in 1-2 days. Edema to legs, shortness of breath. 4 tablet 2     metoprolol succinate (TOPROL-XL) 25 MG TAKE 2 TABLETS BY MOUTH once DAILY 180  tablet 1     mirtazapine (REMERON) 7.5 MG tablet TAKE 1 TO 3 TABLETS BY MOUTH AT BEDTIME. 270 tablet 3     potassium chloride (K-DUR,KLOR-CON) 10 MEQ tablet Take 1 tablet (10 mEq total) by mouth 2 (two) times a day. 60 tablet 2     torsemide (DEMADEX) 100 MG tablet Take 0.5 tablets (50 mg total) by mouth daily. 90 tablet 3     valACYclovir (VALTREX) 500 MG tablet 500 mg daily.        No current facility-administered medications on file prior to visit.        Assessment/Plan:  1. Slow transit constipation  85-year-old gentleman with uncomplicated constipation.  I recommend that he increase his MiraLAX to twice daily and add 1 to 2 tablets of Senokot once daily.  Would expect results in the next 1 to 2 days.  If after 2 to 3 days he is not experiencing any relief, he will call back to the clinic.  Once he has had a bowel movement, he will stop the Senokot and decrease the MiraLAX to once daily for 1 week.  Finally, we discussed the option of a trial of a glycerin suppository.  They are advised to avoid any over-the-counter enema treatments and to call back with any worsening symptoms or further questions.  - polyethylene glycol (GLYCOLAX) 17 gram/dose powder; Take 17 g by mouth 2 (two) times a day. Once you have results, reduce to once daily for 1 week, then prn  Dispense: 510 g; Refill: 0  - senna (SENOKOT) 8.6 mg tablet; Take 1 tablet by mouth 2 (two) times a day. Use until constipation resolves then as needed thereafter  Dispense: 60 tablet; Refill: 1        Phone call duration:  19 minutes    Camilal Rai MD

## 2021-06-08 NOTE — PROGRESS NOTES
Remote device check.  Please see link for full device report.  Patient was informed of results and next follow up via phone call.   Rosita Hunt RN

## 2021-06-08 NOTE — TELEPHONE ENCOUNTER
Remote device interrogation reviewed.  Pt with short (<5sec) episodes of asymptomatic NSVT.  Most recent echo from 2019 shows LVEF 51% and no WMA.  Ok to continue routine device f/u.  Schedule echo (TTE) in 2 months.

## 2021-06-08 NOTE — PROGRESS NOTES
ASSESSMENT:   1. URI (upper respiratory infection)         PLAN:  Cough  Likely viral infection at this time  Push fluids, get extra rest  Recommend hot tea with lemon/honey, lozenges to soothe throat and settle cough  May use a cough suppressant, such as Delsym or Robitussin or a cool mist humidifier to lessen cough  Return to clinic if symptoms are not improving as expected or if worsening in any way.         SUBJECTIVE:   Frank Robles is a 82 y.o. male presents today with cough for 10 days. He has had dry cough but denies congestion, sore throat, swollen glands, post nasal drip, myalgias, headache, fever, chills and SOB, dyspnea, ankle swelling, reflux, abd pain, n/v. No change in medications recently. Sick contacts: None. Has tried nothing for relief. Nonsmoker. No hx of asthma, has a partially collapsed left lung.     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        gabapentin (NEURONTIN) 300 MG capsule TAKE 2-3 CAPSULES BY MOUTH DAILY AS DIRECTED 270 capsule 2     lisinopril (PRINIVIL,ZESTRIL) 20 MG tablet TAKE 1 TABLET BY MOUTH ONCE DAILY 300 tablet 0     metoprolol succinate (TOPROL-XL) 25 MG TAKE 2 TABLETS BY MOUTH DAILY 100 tablet 1     mirtazapine (REMERON) 15 MG tablet TK 1 T PO  QHS  0     mirtazapine (REMERON) 7.5 MG tablet Take 1 tablet with a 15mg tab PO qhs       valACYclovir (VALTREX) 500 MG tablet 500 mg daily.        warfarin (COUMADIN) 5 MG tablet Take 1/2 to 1 tablets (2.5 to 5 mg) by mouth daily. Adjust dose based on INR results as directed. 90 tablet 1     ZIRGAN 0.15 % Gel PRN       No current facility-administered medications for this visit.        Allergies:   Allergies   Allergen Reactions     Zolpidem Other (See Comments)     Hallucinations needed to be restrained in hospital     Diphenhydramine Hcl Other (See Comments)     tachycardia       OBJECTIVE:   Vitals:    01/30/17 1230   BP: 130/80   Pulse: 72   Resp: 24   Temp: 97.8  F (36.6  C)   TempSrc: Oral   SpO2: 93%   Weight: 169 lb 6.4 oz (76.8 kg)     Physical exam reveals a pleasant 82 y.o. male.   Appears healthy, alert and cooperative.  Eyes:  RAFAELA, EOMI  Ears:  normal TMs bilaterally and normal canals bilaterally  Nose:    Mucosa normal. Scant, clear rhinorrhea.septum midline, normal mucosa  Mouth:  Mucosa pink and moist.  normal-appearing mucosa and no pharyngitis, no exudate   Neck: normal, supple and no adenopathy  Sinuses: nontender with palpation  Lungs: Chest is clear, no wheezing or rales. Symmetric air entry throughout both lung fields.  Heart: regular rate and rhythm, no murmur, rub or gallop

## 2021-06-08 NOTE — TELEPHONE ENCOUNTER
Type: routine pacemaker check  Presenting Rhythm: AP/BiVP, rates: 62bpm.  Lead/Battery status: stable   Arrhythmias: since 3/3/20, AT/AF burden: 28.2% EGMs show PAF, longest episode last >99 hours. v-rates controlled >/=120bpm ~5%. 2 VHRs. 1 EGM shows NSVT episode lasting ~8 seconds other EGM appears NSVT ~ 6 seconds. Rates: 170-180bpm.  EF 51%.   Anticoagulant: apixaban  Comments: normal device function. jtr     Transmission reviewed. Known PAF, on Eliquis and asymptomatic. Review of other EGMs suggest 2 episodes of NSVT lasting 6-8 seconds on May 8th at ~ 830 AM and on May 13th ~ midnight. V-rates 170s.  Last EF 51% per Echo 9/13/19. Reviewed with patient and his wife. Denies any troublesome palpitations or near-syncope. Confirms Toprol XL 50 mg daily. Advised to call with any near-syncope/syncope so we can have him send a download for arrhythmia review. Verbalized understanding.     Will review EGMs with Dr. Segundo due to durations >5 seconds.     Rosita Hunt RN

## 2021-06-08 NOTE — TELEPHONE ENCOUNTER
Call placed to patient to review recommendations from Dr. Segundo and to remind wife/patient to call with any near-syncope. Discussed Echo in ~2 months (orders were already placed) and wife states he is also due for follow up with Dr. Hartman. She will call to schedule. Denies any other questions/concerns at this time.     Rosita Hunt RN

## 2021-06-09 NOTE — TELEPHONE ENCOUNTER
RN cannot approve Refill Request    RN can NOT refill this medication med is not covered by policy/route to provider. Last office visit: 9/21/2017 Timbo Auguste MD Last Physical: 6/14/2019 Last MTM visit: Visit date not found Last visit same specialty: 9/13/2019 Nevaeh Paul DO.  Next visit within 3 mo: Visit date not found  Next physical within 3 mo: Visit date not found      Jennifer Zheng, [facility] Connection Triage/Med Refill 6/23/2020    Requested Prescriptions   Pending Prescriptions Disp Refills     mirtazapine (REMERON) 7.5 MG tablet [Pharmacy Med Name: Mirtazapine Oral Tablet 7.5 MG] 270 tablet 0     Sig: TAKE 1 TO 3 TABLETS BY MOUTH nightly AT BEDTIME       Tricyclics/Misc Antidepressant/Antianxiety Meds Refill Protocol Passed - 6/22/2020  2:35 PM        Passed - PCP or prescribing provider visit in last year     Last office visit with prescriber/PCP: 9/21/2017 Timbo Auguste MD OR same dept: 9/13/2019 Nevaeh Paul DO OR same specialty: 9/13/2019 Nevaeh Paul DO  Last physical: 6/14/2019 Last MTM visit: Visit date not found   Next visit within 3 mo: Visit date not found  Next physical within 3 mo: Visit date not found  Prescriber OR PCP: Timbo Auguste MD  Last diagnosis associated with med order: 1. Insomnia  - mirtazapine (REMERON) 7.5 MG tablet [Pharmacy Med Name: Mirtazapine Oral Tablet 7.5 MG]; TAKE 1 TO 3 TABLETS BY MOUTH nightly AT BEDTIME  Dispense: 270 tablet; Refill: 0    If protocol passes may refill for 12 months if within 3 months of last provider visit (or a total of 15 months).

## 2021-06-10 NOTE — PROGRESS NOTES
SUBJECTIVE:    This is a pleasant 82-year-old male who presents to the clinic with his wife.  Over the past 10 days he has had a cough.  This tends to be worse in the morning.  This generally is nonproductive.  He feels like he has some mucus in his throat.  He is not sure if he has allergies.  He actually states that he feels fine.  He has not had a fever.  Denies sore throat or ear pain.  He has no known history of asthma.  Medical history is notable for coronary artery disease, hypertension, and depression.  He has a progressive dysarthria which has been extensively evaluated by [facility]. Regarding his heart history, he has a complex history including known ischemic cardiomyopathy. He has had previous coronary artery bypass graft surgery and has had sick sinus syndrome with pacemaker placement. His previous surgery was complicated by a left atrial appendage laceration which required a second operation. He developed a left phrenic neuropathy with a decline in his ejection fraction to 34%. His more recent echocardiogram showed an improvement in his ejection fraction to 45%. He continues to follow up with cardiology. He does have a history of atrial fibrillation with previous pulmonary vein isolation ablation in the past. He did have atrial flutter and required cardioversion.     Patient Active Problem List   Diagnosis     Cardiac Devices Pacemaker Present     Adverse Effect Of Anticholinergics     Chronic Major Depression     Frequent, Full-bladder Emptying (Polyuria)     Constipation     Insomnia     Actinic Keratosis     Lipoma Of The Adipose Tissue     Hypertension     Coronary Artery Disease     Congestive Heart Failure     Chronic Reflux Esophagitis     Cardiomyopathy     Sick sinus syndrome with tachycardia     Pacemaker lead malfunction     Paroxysmal atrial fibrillation     Atrial flutter     Dysarthria       Current Outpatient Prescriptions on File Prior to Visit   Medication Sig     furosemide (LASIX)  20 MG tablet Take 20 mg by mouth daily.     gabapentin (NEURONTIN) 300 MG capsule TAKE 2 TO 3 CAPSULES BY MOUTH DAILY AS DIRECTED     lisinopril (PRINIVIL,ZESTRIL) 20 MG tablet TAKE 1 TABLET BY MOUTH ONCE DAILY     metoprolol succinate (TOPROL-XL) 25 MG TAKE 2 TABLETS BY MOUTH DAILY     mirtazapine (REMERON) 15 MG tablet TK 1 T PO  QHS     mirtazapine (REMERON) 7.5 MG tablet Take 1 tablet with a 15mg tab PO qhs     valACYclovir (VALTREX) 500 MG tablet 500 mg daily.      warfarin (COUMADIN) 5 MG tablet TAKE 1/2 TO 1 TABLET BY MOUTH DAILY. ADJUST DOSE BASED ON INR RESULTS/AS DIRECTED.     No current facility-administered medications on file prior to visit.        Allergies   Allergen Reactions     Zolpidem Other (See Comments)     Hallucinations needed to be restrained in hospital     Diphenhydramine Hcl Other (See Comments)     tachycardia            A 12 point comprehensive review of systems was negative except as noted.      OBJECTIVE:     Vitals:    05/18/17 1030   BP: 110/66   Pulse: 75   Resp: 16   Temp: 98.2  F (36.8  C)   SpO2: 99%       General: Alert, not acutely distressed   Head:  Atraumatic, normocephalic  Ears:  TMs normal pearly-gray  Eyes:  PERRL, extraocular movements are intact  Nose:  Septum midline  Throat:  Oral mucosa moist, oropharynx clear  Neck:  Supple without adenopathy or thyromegaly   Cardiovascular: S1-S2 with regular rate and without murmur, rub, or gallop   Lungs:  Clear to auscultation bilaterally   Extremities: Without cyanosis or edema   Neuro:  CN II-XII appear intact        Laboratory Results:    Results for orders placed or performed in visit on 05/08/17   Remote Device Check   Result Value Ref Range    Comments/Summary       Type: routine remote pacemaker transmission.   Presenting rhythm: Atrial flutter with ventricular pacing, rate 81 bpm.   Battery/lead status: Stable.   Arrhythmias: Since 1/3/17; AT/AF burden remains 100%, ventricular rates during AT/AF well-controlled. No  ventricular high rate episodes detected.   Comments: Normal pacemaker function. Pt taking Warfarin. [facility]           ASSESSMENT AND PLAN:    1. Cough  May be due to a viral respiratory infection or allergies with postnasal drainage    Given his recent symptoms and the fact that he generally feels fine recommend symptomatic treatment  Encourage fluids, rest, and tea mixed with honey  Can consider treatment with an antihistamine if having ongoing symptoms such as Claritin or Zyrtec  Would not favor antibiotics at this time.  However, if he develops worsening cough or shortness of breath would favor follow-up for reevaluation and probable chest x-ray    Patient agrees to plan        Timbo Auguste MD      This note has been dictated and transcribed using voice recognition software.  Any grammatical or contextual distortions are unintentional and inherent to the software.

## 2021-06-11 NOTE — PATIENT INSTRUCTIONS - HE
Patient Education   Instrumental Activities of Daily Living  Your Health Risk Assessment indicates you have difficulties with instrumental activities of daily living which include laundry, housekeeping, banking, shopping, using the telephone, food preparation, transportation, or taking your own medications. Please make a follow up appointment for us to address this issue in more detail.    [employer] has resources available on the following website: [email or web address]     Also, here is a local agency that provides help with meals and other assistance:   [facility] Line: [phone]       Advance Directive  Patient s advance directive was discussed and I am comfortable with the patient s wishes.  Patient Education   Personalized Prevention Plan  You are due for the preventive services outlined below.  Your care team is available to assist you in scheduling these services.  If you have already completed any of these items, please share that information with your care team to update in your medical record.  Health Maintenance   Topic Date Due     DEPRESSION ACTION PLAN  07/17/1934     HEART FAILURE ACTION PLAN  07/17/1934     MEDICARE ANNUAL WELLNESS VISIT  06/14/2020     LIPID  06/14/2020     FALL RISK ASSESSMENT  06/14/2020     INFLUENZA VACCINE RULE BASED (1) 08/01/2020     BMP  09/13/2020     CBC  12/02/2020     ALT  03/13/2021     TD 18+ HE  06/06/2024     ADVANCE CARE PLANNING  03/20/2025     TSH  Completed     PNEUMOCOCCAL IMMUNIZATION 65+ LOW/MEDIUM RISK  Completed     ZOSTER VACCINES  Completed     HEPATITIS B VACCINES  Aged Out      We will check your lab tests  You received the flu shot today  Continue to follow-up with cardiology, Dr. Erickson Hartman

## 2021-06-11 NOTE — PROGRESS NOTES
SUBJECTIVE:    This is a pleasant 82-year-old male who presents to the clinic for a complete physical examination.  He has a complex medical history including ischemic cardiomyopathy, previous coronary artery bypass graft surgery, and atrial flutter followed by cardiology. He has a complex history including known ischemic cardiomyopathy. He has had previous coronary artery bypass graft surgery and has had sick sinus syndrome with pacemaker placement. His previous surgery was complicated by a left atrial appendage laceration which required a second operation. He developed a left phrenic neuropathy.  His more recent echocardiogram showed a decline in his ejection fraction to 34% and later improvement in his ejection fraction to 45%. He continues to follow up with cardiology. He does have a history of atrial fibrillation with previous pulmonary vein isolation ablation in the past. He did have atrial flutter and required cardioversion. He continues to take metoprolol and lisinopril as well as warfarin.  He also was followed by the [facility] for dysarthria.  Also, he has followed up previously with some gait instability.  He has been taking mirtazapine for his depression which has generally been stable.  He denies recent headache, dizziness, chest pain, shortness of breath, palpitations.  He is generally doing well.  He denies bowel concerns.  Denies urinary concerns.      Patient Active Problem List   Diagnosis     Cardiac Devices Pacemaker Present     Adverse Effect Of Anticholinergics     Chronic Major Depression     Frequent, Full-bladder Emptying (Polyuria)     Constipation     Insomnia     Actinic Keratosis     Lipoma Of The Adipose Tissue     Hypertension     Coronary Artery Disease     Congestive Heart Failure     Chronic Reflux Esophagitis     Cardiomyopathy     Sick sinus syndrome with tachycardia     Pacemaker lead malfunction     Paroxysmal atrial fibrillation     Atrial flutter     Dysarthria       Current  Outpatient Prescriptions on File Prior to Visit   Medication Sig     furosemide (LASIX) 20 MG tablet Take 20 mg by mouth daily.     gabapentin (NEURONTIN) 300 MG capsule TAKE 2 TO 3 CAPSULES BY MOUTH DAILY AS DIRECTED     lisinopril (PRINIVIL,ZESTRIL) 20 MG tablet TAKE 1 TABLET BY MOUTH ONCE DAILY     metoprolol succinate (TOPROL-XL) 25 MG TAKE 2 TABLETS BY MOUTH DAILY     mirtazapine (REMERON) 15 MG tablet TK 1 T PO  QHS     mirtazapine (REMERON) 7.5 MG tablet Take 1 tablet with a 15mg tab PO qhs     valACYclovir (VALTREX) 500 MG tablet 500 mg daily.      warfarin (COUMADIN) 5 MG tablet TAKE 1/2 TO 1 TABLET BY MOUTH DAILY. ADJUST DOSE BASED ON INR RESULTS/AS DIRECTED.     No current facility-administered medications on file prior to visit.        Allergies   Allergen Reactions     Zolpidem Other (See Comments)     Hallucinations needed to be restrained in hospital     Diphenhydramine Hcl Other (See Comments)     tachycardia            A 12 point comprehensive review of systems was negative except as noted.      OBJECTIVE:     Vitals:    06/02/17 1030   BP: 128/72   Pulse: 80   Resp: 20   Temp: 97.5  F (36.4  C)       General: Alert, not acutely distressed   Head:  Atraumatic, normocephalic  Ears:  TMs normal pearly-gray  Eyes:  PERRL, extraocular movements are intact  Nose:  Septum midline  Throat:  Oral mucosa moist, oropharynx clear  Neck:  Supple without adenopathy or thyromegaly   Cardiovascular: S1-S2 with regular rate and without murmur, rub, or gallop   Lungs:  Clear to auscultation bilaterally   Abdomen:  Soft, non tender, nondistended  Extremities: Without cyanosis or edema   Neuro:  CN II-XII appear intact        Laboratory Results:    Results for orders placed or performed in visit on 05/08/17   Remote Device Check   Result Value Ref Range    Comments/Summary       Type: routine remote pacemaker transmission.   Presenting rhythm: Atrial flutter with ventricular pacing, rate 81 bpm.   Battery/lead status:  Stable.   Arrhythmias: Since 1/3/17; AT/AF burden remains 100%, ventricular rates during AT/AF well-controlled. No ventricular high rate episodes detected.   Comments: Normal pacemaker function. Pt taking Warfarin. [facility]           ASSESSMENT AND PLAN:    1. Routine general medical examination at a health care facility    Remained that he remain physically active  Discussed falls risks  Reviewed depression symptoms  PHQ-2 score is 0    2. Coronary Artery Disease  3. Chronic systolic congestive heart failure    Continue to follow-up with the [facility] with cardiology  Continue metoprolol and lisinopril    4. Chronic Reflux Esophagitis    He may take ranitidine or omeprazole as needed    5. Chronic Major Depression    Remeron.  This has been stable    6. Dysarthria    Followed by [facility]    7. Hypertension, stable    Continue metoprolol and lisinopril      Patient agrees to plan  As noted, he continues to follow-up with the [facility] for ongoing care and will have those recommendations forwarded.  Patient will follow-up sooner as needed    Timbo Auguste MD      This note has been dictated and transcribed using voice recognition software.  Any grammatical or contextual distortions are unintentional and inherent to the software.

## 2021-06-11 NOTE — TELEPHONE ENCOUNTER
Refill Approved    Rx renewed per Medication Renewal Policy. Medication was last renewed on 6/23/20.    Marilee Loza, [facility] Connection Triage/Med Refill 9/25/2020     Requested Prescriptions   Pending Prescriptions Disp Refills     mirtazapine (REMERON) 7.5 MG tablet [Pharmacy Med Name: Mirtazapine Oral Tablet 7.5 MG] 270 tablet 0     Sig: TAKE 1 TO 3 TABLETS BY MOUTH nightly AT BEDTIME       Tricyclics/Misc Antidepressant/Antianxiety Meds Refill Protocol Passed - 9/23/2020  2:27 PM        Passed - PCP or prescribing provider visit in last year     Last office visit with prescriber/PCP: 9/21/2017 Timbo Auguste MD OR same dept: Visit date not found OR same specialty: 9/13/2019 Nevaeh Paul DO  Last physical: 9/14/2020 Last MTM visit: Visit date not found   Next visit within 3 mo: Visit date not found  Next physical within 3 mo: Visit date not found  Prescriber OR PCP: Timbo Auguste MD  Last diagnosis associated with med order: 1. Insomnia  - mirtazapine (REMERON) 7.5 MG tablet [Pharmacy Med Name: Mirtazapine Oral Tablet 7.5 MG]; TAKE 1 TO 3 TABLETS BY MOUTH nightly AT BEDTIME  Dispense: 270 tablet; Refill: 0    If protocol passes may refill for 12 months if within 3 months of last provider visit (or a total of 15 months).

## 2021-06-11 NOTE — PROGRESS NOTES
Assessment and Plan:       1. Medicare annual wellness visit, subsequent    Reviewed the annual wellness visit health risk assessment form  PHQ-2 score is 0  Reviewed falls risk  Mini cog score is 2/5  Reviewed memory issues.  He and his wife have no concerns currently    Influenza vaccine was given  - Influenza,Quad,High Dose,PF 65 YR+    He and his wife continue to receive 24-hour cares at their residence    2. Encounter for immunization     - Influenza,Quad,High Dose,PF 65 YR+    3. Chronic heart failure with preserved ejection fraction (H)  Currently stable    Continue Metroprolol and furosemide  Continue to follow-up with cardiology  Recommend monitoring daily weight    - Basic Metabolic Panel  - Hepatic Profile  - HM2(CBC w/o Differential)  - LDL Cholesterol, Direct    4. Sick sinus syndrome with tachycardia (H)  5. Biventricular cardiac pacemaker in situ    Received a pacemaker.  This past year    6. Episode of recurrent major depressive disorder, unspecified depression episode severity (H)    Currently stable  Continue mirtazapine          The patient's current medical problems were reviewed.    I have had an Advance Directives discussion with the patient.  The following health maintenance schedule was reviewed with the patient and provided in printed form in the after visit summary:   Health Maintenance   Topic Date Due     DEPRESSION ACTION PLAN  07/17/1934     HEART FAILURE ACTION PLAN  07/17/1934     MEDICARE ANNUAL WELLNESS VISIT  06/14/2020     LIPID  06/14/2020     FALL RISK ASSESSMENT  06/14/2020     INFLUENZA VACCINE RULE BASED (1) 08/01/2020     BMP  09/13/2020     CBC  12/02/2020     ALT  03/13/2021     TD 18+ HE  06/06/2024     ADVANCE CARE PLANNING  03/20/2025     TSH  Completed     PNEUMOCOCCAL IMMUNIZATION 65+ LOW/MEDIUM RISK  Completed     ZOSTER VACCINES  Completed     HEPATITIS B VACCINES  Aged Out        Subjective:   Chief Complaint: Frank Robles is an 86 y.o. male here for an Annual  Wellness visit.   HPI: This is a pleasant 86-year-old male who presents to the clinic with his wife for an annual wellness visit.    He has a known history of an ischemic car myopathy with previous coronary artery bypass graft surgery.  He has had previous atrial flutter followed by cardiology previously at [facility].  He has a history of sick sinus syndrome with pacemaker placement..  Surgery was complicated by left atrial appendage laceration which required a second operation.  He developed a left phrenic neuropathy.  Atrial fibrillation was treated with previous pulmonary vein isolation ablation.  He is to be treated with Eliquis.  His most recent echocardiogram showed a preserved ejection fraction of 51% with mild mitral regurgitation.  He continues to follow-up with cardiology.  He did have his pacemaker upgraded this past year.    He has followed with the [facility] regarding dysarthria.  This has not changed.    He reports he has been doing well and has no specific concerns.  He rates his health as good.  He exercises 5-7 times per week.  His wife reports he has been experiencing some memory problems but this has not been a significant challenge.  Given that his wife is been experiencing significant health concerns they do have 24-hour care where they live.      Review of systems is negative for headache, chest pain, shortness of breath, palpitations, or bowel changes.    He reports his mood has been fine.  He has not had any falls recently.    Review of Systems:    Please see above.  The rest of the review of systems are negative for all systems.    Patient Care Team:  Timbo Auguste MD as PCP - General (Family Medicine)  Nevaeh Paul DO as Assigned PCP     Patient Active Problem List   Diagnosis     Biventricular cardiac pacemaker in situ     Adverse Effect Of Anticholinergics     Chronic Major Depression     Frequent, Full-bladder Emptying (Polyuria)     Constipation     Insomnia      Actinic Keratosis     Lipoma Of The Adipose Tissue     Hypertension     Coronary Artery Disease     Persistent Atrial Fibrillation     Chronic Reflux Esophagitis     Ischemic cardiomyopathy     Sick sinus syndrome with tachycardia (H)     Atrial flutter (H)     Dysarthria     Heart failure with preserved ejection fraction (H)     Third degree AV block (H)     Complete AV block due to AV amira ablation (H)     History of skin cancer     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     Past Medical History:   Diagnosis Date     Actinic keratosis      Acute posthemorrhagic anemia      Anemia      Anxiety disorder due to general medical condition      Atrial fibrillation (H)      Atrial flutter (H)      Biventricular cardiac pacemaker in situ     Previous right-sided implant  Medtronic [id number] Advisa DR CARBAJAL DOI: 9/8/2014 Upgrade to CRT-P: Dec 2, 2019     CAD (coronary artery disease)      Cardiomyopathy (H)      CHF (congestive heart failure) (H)      Chronic reflux esophagitis      Chronic systolic heart failure (H)     Created by [employer] [facility] Annotation: Mar 18 2013  1:29PM - Dave Segundo: diastolic  Replacement Utility updated for latest IMO load     Constipation      Decubitus ulcer      Fatigue      Fatigue      Foot pain      Hemorrhoids      HTN (hypertension)      Insomnia      Lipoma     adipose tissue     Major depression, chronic      Pacemaker lead malfunction 9/8/2014     Pleural effusion      Polyuria      Slurred speech     few months after open heart     SSS (sick sinus syndrome) (H)      Stroke (H)      Third degree AV block (H) 12/2/2019     Transient global amnesia      Vasovagal syncope       Past Surgical History:   Procedure Laterality Date     CARDIAC PACEMAKER PLACEMENT       IR EXTREMITY VENOGRAM LEFT  9/23/2019     [place] ABLATE HEART DYSRHYTHM FOCUS      Description: Catheter Ablation Atrial Fibrillation;  Recorded: 02/05/2014;  Comments: PVI Aug 2009: (PVI-RF + roof  line)     [place] CABG, VEIN, SINGLE      Description: CABG (CABG);  Proc Date: 02/07/2013;  Comments: Dr. Jatinder Simpson at [facility] ; Feb 2013: CAB X 3 (LIMA to LAD, SVG to OM, SVG to PDA); ; Reopened same day for RA bleeding     [place] REMOVAL OF TONSILS,<13 Y/O      Description: Tonsillectomy;  Recorded: 03/26/2013;     TOTAL HIP ARTHROPLASTY      bilateral      Family History   Problem Relation Age of Onset     Heart attack Father 76      Social History     Socioeconomic History     Marital status:      Spouse name: Not on file     Number of children: Not on file     Years of education: Not on file     Highest education level: Not on file   Occupational History     Not on file   Social Needs     Financial resource strain: Not on file     Food insecurity     Worry: Not on file     Inability: Not on file     Transportation needs     Medical: Not on file     Non-medical: Not on file   Tobacco Use     Smoking status: Never Smoker     Smokeless tobacco: Never Used   Substance and Sexual Activity     Alcohol use: Not Currently     Alcohol/week: 1.0 standard drinks     Types: 1 Cans of beer per week     Drug use: No     Sexual activity: Not on file   Lifestyle     Physical activity     Days per week: Not on file     Minutes per session: Not on file     Stress: Not on file   Relationships     Social connections     Talks on phone: Not on file     Gets together: Not on file     Attends Muslim service: Not on file     Active member of club or organization: Not on file     Attends meetings of clubs or organizations: Not on file     Relationship status: Not on file     Intimate partner violence     Fear of current or ex partner: Not on file     Emotionally abused: Not on file     Physically abused: Not on file     Forced sexual activity: Not on file   Other Topics Concern     Not on file   Social History Narrative     Not on file      Current Outpatient Medications   Medication Sig Dispense Refill      "diphenhydrAMINE-acetaminophen (TYLENOL PM)  mg Tab Take 1 tablet by mouth at bedtime as needed.       ELIQUIS 5 mg Tab tablet TAKE 1 TABLET (5 mg) BY MOUTH 2 times a day 180 tablet 1     gabapentin (NEURONTIN) 300 MG capsule Take 3 capsules (900 mg total) by mouth at bedtime.       gabapentin (NEURONTIN) 300 MG capsule Take 1 capsule (300 mg total) by mouth every morning.       MELATONIN ORAL Take 3 mg by mouth at bedtime. Takes 1/4 tablet       metOLazone (ZAROXOLYN) 2.5 MG tablet Take 1 tablet (2.5 mg total) by mouth as needed. Take when increse of 2-3 lb in 1-2 days. Edema to legs, shortness of breath. 4 tablet 2     metoprolol succinate (TOPROL-XL) 25 MG TAKE 2 TABLETS BY MOUTH once DAILY 180 tablet 1     mirtazapine (REMERON) 7.5 MG tablet TAKE 1 TO 3 TABLETS BY MOUTH nightly AT BEDTIME 270 tablet 0     polyethylene glycol (GLYCOLAX) 17 gram/dose powder Take 17 g by mouth 2 (two) times a day. Once you have results, reduce to once daily for 1 week, then prn 510 g 0     potassium chloride (KLOR-CON) 10 MEQ CR tablet TAKE 1 TABLET BY MOUTH 2 TIMES PER DAY  180 tablet 0     senna (SENOKOT) 8.6 mg tablet Take 1 tablet by mouth 2 (two) times a day. Use until constipation resolves then as needed thereafter 60 tablet 1     torsemide (DEMADEX) 100 MG tablet Take 0.5 tablets (50 mg total) by mouth daily. 90 tablet 3     valACYclovir (VALTREX) 500 MG tablet 500 mg daily.        No current facility-administered medications for this visit.       Objective:   Vital Signs:   Visit Vitals  /65 (Patient Site: Left Arm, Patient Position: Sitting, Cuff Size: Adult Regular)   Pulse 72   Temp 97.2  F (36.2  C) (Oral)   Ht 5' 9\" (1.753 m)   Wt 153 lb (69.4 kg)   BMI 22.59 kg/m           VisionScreening:  No exam data present     PHYSICAL EXAM  General appearance: alert, appears stated age and cooperative  Head: Normocephalic, without obvious abnormality, atraumatic  Eyes: conjunctivae/corneas clear. PERRL, EOM's intact. "   Ears: normal TM's and external ear canals both ears  Nose: Nares normal. Septum midline. Mucosa normal. No drainage or sinus tenderness.  Throat: lips, mucosa, and tongue normal; teeth and gums normal  Neck: no adenopathy, supple, symmetrical, trachea midline and thyroid not enlarged  There are no carotid bruits  Back: symmetric, no curvature. ROM normal. No CVA tenderness.  Lungs: clear to auscultation bilaterally  Heart: regular rate and rhythm, S1, S2 normal, no murmur, click, rub or gallop  Abdomen: soft, non-tender; bowel sounds normal; no masses,  no organomegaly  Extremities: extremities normal, atraumatic, no cyanosis or edema  Skin: Skin color, texture, turgor normal  Lymph nodes: Cervical nodes normal.  Neurologic: Alert and oriented X 3  Voice is hoarse  Judgment and insight appear intact      Assessment Results 9/14/2020   Activities of Daily Living No help needed   Instrumental Activities of Daily Living 2-4 - Moderate impairment   Mini Cog Total Score 2   Some recent data might be hidden     A Mini-Cog score of 0-2 suggests the possibility of dementia, score of 3-5 suggests no dementia        Identified Health Risks:     The patient reports that he has difficulty with instrumental activities of daily living.  He was provided with a list of local organizations that provide support services and advised to make a follow up appointment in 12 weeks to address this further.   Patient's advanced directive was discussed and I am comfortable with the patient's wishes.        The patient was provided with appropriate referrals to address his memory problem.

## 2021-06-12 NOTE — TELEPHONE ENCOUNTER
Per patient daughterChula, when she called in with concerns the  set-up patient to see Kellipreston Mathias tomorrow morning at 10:30.     Daughter notes that patient has been getting more short of breath with activity and O2 sats have been decreasing from baseline. At rest patient sats hover right around 93%, but decrease to 84 % with activity. Patient's work of breathing at rest reportedly not labored. Patient has also been losing weight and she is not sure how to assess for signs of heart failure. Reviewed with Chula what signs and symptoms would warrant more urgent eval overnight, she will touch base with patient's home care aide to make sure patient does not need to be evaluated sooner in an more urgent setting, otherwise he will keep currently scheduled appointment tomorrow. -ejb    ----- Message from Sherley Lugo sent at 11/9/2020  4:58 PM CST -----  General phone call:  PATIENT HAVING MORE shortness of breath, AND LOWERED O2 WITH ACTIVITY,   PLEASE CALL    Caller: DAUGHTERSIMRAN  Primary cardiologist: TRAY KRAUS KAREN  Detailed reason for call: SEE ABOVE  New or active symptoms? YES, SEE ABOVE  Best phone number: [phone]  Best time to contact: ANY TIME  Ok to leave a detailed message? YES  Device? YES    Additional Info:

## 2021-06-13 NOTE — TELEPHONE ENCOUNTER
"Chula is calling today, Bernard's daughter is calling to discuss her concerns regarding his discharge instructions and current status.  Bernard was discharged on  from [facility]  to his home with private pay HC coverage. He was going to have oxygen prescribed, but at the time of his leaving they were under the impression that he did not meet the medicare requirements for home oxygen to be covered. They elected to obtain this on their own private pay for his comfort. The parameters they recall being told were to use 1 liter per n/c to keep his pulse oximeter readings 88-96% and that too high of a oxygen reading would not be good for him, \" toxic to his heart, and suppress his initiative to breathe\".  They have seen him go from 73% on Room Air, while up walking in the apartment, so place him on the 1 liter per n/c and then it goes up to 88 to 100% area. They are concerned after being told that it should not be so high, so they attempt to monitor it frequently and marco the readings.  At one point when the oxygen level was 100% they noted his pulse rate was also elevated. When they turned it down, his pulse rate seemed to also reduce to 60-70 rate.  I advised that with his hx of Afib and pacemaker settings, that this may have simply been a coincidence.  They will be seeing his PCP tomorrow, and they are asking for the followin) HC orders: ---directed to obtain from PCP  2) asking is there a need for a remote device check prior to the PCP appt tomorrow to see where his HR /rhythm has been?  3) Asking for direction, how to obtain a RT evaluation, including Oximetry with activity, such as a 6 minute walk so that ultimately, there could be oxygen orders obtained with parameters so they are not feeling so anxious about where his oxygen saturations are as well as eye-balling the HR nearly 24/7  Ideally they would like to see that this evaluation be done to also provide Medicare coverage of the home oxygen so they would " not have to pay out of pocket.     Kelli Mendoza CNP is this something that you can assist with? sk/RN

## 2021-06-13 NOTE — TELEPHONE ENCOUNTER
----- Message from Kelli Mathias CNP sent at 12/4/2020  7:36 AM CST -----  Please call daughter Chula this morning.  BNP elevated and retaining fluid on exam.  Please have him take metolazone 2.5 mg once today.  Please follow up with him on Monday to see how he responded.  I may need to give him more doses.  Please make sure he has the on call number for this weekend.  He is declining hospitalization.

## 2021-06-13 NOTE — TELEPHONE ENCOUNTER
Pt daughter Chula is calling today with concerns regarding Bernard's wt gain of 8# in the past 7 days.  She states he has swelling noted in his ankles, feet and lower legs. He has mild shortness of breath with exertion noted. He denies dizziness, lightheadedness or chestpain.  He is currently using Torsemide 50mg daily.     Kelli Mendoza CNP what are your recommendations at this time? sk/SENTHIL

## 2021-06-13 NOTE — TELEPHONE ENCOUNTER
Primary care provider can assist with oxygen orders/questions tomorrow. He has a remote device check scheduled next week and does not need to have one before appointment tomorrow.

## 2021-06-13 NOTE — PROGRESS NOTES
Hospital Follow-up Visit:    Hospital/Nursing Home/IP Rehab Facility: [facility]  Date of Admission: 11/10/20  Date of Discharge: 11/15/20  Reason(s) for Admission: Hypoxemia, pneumonia, mild CHF exacerbation.    Post Discharge Medication Reconciliation: Reconciled by myself    Summary of hospitalization:  [facility] discharge summary reviewed and copied below    Acute hypoxic respiratory failure with dyspnea  Patient initially presented with acute hypoxia and dyspnea.  Chest x-ray with right mid and lower lung airspace opacities.  CTA on admission without PE and only with mild pulmonary congestion, but no consolidations.  Evidence of pulmonary hypertension on CTA chest as well as emphysema.  Symptomatic COVID-19 negative.  Patient had been started on ceftriaxone and azithromycin but these discontinued as no clear signs of consolidation or infection.  Aspect of dyspnea likely secondary to heart failure exacerbation but also primarily from moderate pulmonary hypertension.  Patient improved with diuresis, supplemental oxygen and was discharged to home with a prescription to obtain oxygen on its own means per the request of himself and his daughter.     Acute on chronic HFpEF and CAD  BNP mildly elevated 376, evidence of pulmonary congestion on CTA chest.  Stress test 11/13 with ejection fraction of 51% (also with known mitral and tricuspid regurgitation).  CABG x3 in 2013.    Patient was followed by cardiology during his stay, to follow-up with them in 3 to 4 weeks.  Resume home torsemide 50 mg p.o. daily     Atrial fibrillation  Status post ablation 2014.  Currently rate controlled.  Metoprolol 25 mg p.o. daily, Eliquis 5 mg p.o. twice daily     Chronic pain  Gabapentin 300 mg p.o. every morning and 900 mg p.o. every afternoon     Cognitive impairment with mood disorder  Mirtazapine 22.5 mg p.o. daily.  PT/OT recommending TCU, family opted for home therapy and statin     Normocytic  anemia  Hemoglobin 11.4 with MCV of 90.  Likely anemia of chronic disease.    Diagnostic Tests/Treatments reviewed:    Impression:       1.  No acute pulmonary embolism. Enlarged main pulmonary artery likely relates to pulmonary hypertension.   2.  Biatrial enlargement with calcifications along the pulmonary vein ostia likely related to previous A. fib ablation procedure.   3.  Chronic basilar pleural thickening, minimal fluid and subpleural opacities consistent with rounded atelectasis. There is likely a small component of interstitial lung edema of uncertain acuity.   4.  Emphysema.   5.  Symmetric mildly enlarged hilar and mediastinal lymph nodes, likely chronic secondary to #2 and #3.      Impression:     Probable emphysematous changes with chronic scarring at the lung bases and chronic blunting of the costophrenic angles as well as mild chronic elevation left hemidiaphragm. There is new subtle airspace opacity in the right mid and lower lung   suggesting right-sided pneumonia. Differential diagnosis would include asymmetric pulmonary edema. No pneumothorax. Stable cardiac enlargement. Left subclavian approach pacemaker with leads over the right atrium, right ventricle and coronary sinus. Old   leads from a right subclavian approach with leads over the right atrium and right ventricle. Postoperative change from median sternotomy and coronary artery bypass grafting.       Conclusion           The nuclear stress test is negative for inducible myocardial ischemia or infarction.     The stress electrocardiogram was non-diagnostic due to ventricular pacing.     The left ventricular ejection fraction at stress is mildly reduced at 51%. There is global hypokinesis with abnormal septal motion due to ventricular pacing.     The patient is at a low risk of future cardiac ischemic events.     A prior study was conducted on 1/10/2013. Compared to the prior study, the previously noted perfusion defects are no longer present.          Other Healthcare Providers Involved in Patient's Care:          Update since discharge: Worsened:    Drowsiness, confusion:  Patient seems to be falling asleep more during the day. sems to daughter to be more drowsy, confused, and lethargic.  His oxygenation also has been worsening since a hospitalization now requiring continuous oxygen during the day.  He has not been using oxygen at night but his home health aides were there at night note he dips below 88 percentile and is oximeter frequently at night.         Review of Systems:   Complete ROS negative except as noted in the HPI            Physical Exam:   /49   Pulse 64   Temp 97.3  F (36.3  C) (Oral)   Resp 24   Wt 149 lb 9.6 oz (67.9 kg)   SpO2 96% Comment: 1L O2  BMI 22.09 kg/m      General appearance: Alert, cooperative, no distress, appears stated age, fragile appearing,   Head: Normocephalic, atraumatic, without obvious abnormality  Eyes: Pupils equal round, reactive. Conjunctiva clear.  Nose: Nares normal, no drainage.  Throat: Lips, mucosa, tongue normal mucosa pink and moist  Neck: Supple, symmetric, trachea midline, no adenopathy.    Back: Symmetric, no CVA tenderness.  Lungs: Clear to auscultation bilaterally, no wheezing or crackles present.  Respirations unlabored, decreased breath sounds throughout  Heart: Regular rate and rhythm, normal S1 and S2, no murmur, rub or gallop.  Abdomen: Soft, nontender, nondistended.  Bowel sounds active in all 4 quadrants.  No masses or organomegaly.  Extremities: Extremities normal, atraumatic.  No cyanosis or edema.  Skin: Skin color, texture, turgor normal no rashes or lesions on limited skin exam  Neurologic:  Deficits with slurring chronically related to previous stroke.     Assessment and Plan   2. Hypoxemia  3. Ischemic cardiomyopathy  4. Pulmonary emphysema, unspecified emphysema type (H)  5. Pulmonary hypertension (H)  86-year-old gentleman with history of CHF, pulmonary hypertension,  emphysematous changes on CT, weight loss and decompensation.  Has several reasons for hypoxemia that is now becoming more persistent requiring him to do continuous oxygen.  Significant discussion with daughter today about recommendation for continuous oxygen for home health workers that are helping to take care of him during the day.  We will also put referral into respiratory therapy for another evaluation for long-term oxygen use as this was not covered by insurance before but believe it should be.  Seen by cardiology during hospitalization but never pulmonology. will place referral for them to consider treatment for pulmonary hypertension and address patient's emphysematous changes seen on CT and whether something like a spirometry could show obstructive pathology that he would benefit from things like a LABA or LAMA.  - Ambulatory referral to Respiratory Therapy  - Ambulatory referral to Pulmonology    6. Drowsiness  7. Confusion  8. Polypharmacy  9. Insomnia  Significant concern from daughter regarding drowsiness and general confusion from patient during the day that has seemed to be worsening since hospitalization.  On review of medicine patient taking both gabapentin and mirtazapine.  Concern for polypharmacy and side effects related to patient's aging with these medicines.  Thus recommended stopping morning dose of gabapentin and only giving at night.  Also recommended titration off of mirtazapine as below.  Patient originally prescribed these for sleep and nerve pain.  If these are worsening would consider adding back on.  - mirtazapine (REMERON) 7.5 MG tablet; Take 2 tablets (15 mg total) by mouth at bedtime for 7 days, THEN 1 tablet (7.5 mg total) at bedtime for 7 days.  Dispense: 21 tablet; Refill: 0  - gabapentin (NEURONTIN) 300 MG capsule; Take 3 capsules (900 mg total) by mouth at bedtime.  Dispense:  ; Refill: 0    Hamzah Jim MD

## 2021-06-13 NOTE — PROGRESS NOTES
MT Transition of Care Encounter  Assessment & Plan                                                     Post Discharge Medication Reconciliation Status: discharge medications reconciled, continue medications without change    1. Acute respiratory failure with hypoxia  Recently hospitalized. Likely secondary to heart failure exacerbation and moderate pulmonary hypertension. Improved with diuresis. No medication changes.     2. Chronic heart failure with preserved ejection fraction/CAD  Blood pressure is well controlled and meeting goal of <140/90 mm Hg per JNC-8 hypertension guidelines. Symptoms improved with diuresis and discharged on home regimen.     3. Paroxysmal atrial fibrillation  Appropriately on anticoagulation with DOAC, Eliquis. No recent s/sx bleeding. Rate control with metoprolol.     4. Depression/Cognitive impairment with mood disorder  Stable on mirtazapine.     5. Chronic pain syndrome  Adequately controlled with gabapentin.     Follow Up  PRN with MTM    Subjective & Objective                                                       Frank Robles is a 86 y.o. male called for a transitions of care visit. he was discharged from [facility] on 11/15/20 for respiratory failure. I spoke with homecare RN.     Patient consented to a telehealth visit: Yes  Chief Complaint: Hospital discharge medication review  Medication Adherence/Access: Homecare RN sets up meds once a week.     Per discharge summary:  86-year-old male with HFpEF, CAD, atrial fibrillation presented with acute hypoxic respiratory failure and dyspnea. Symptomatic COVID-19 negative.  Patient had been started on ceftriaxone and azithromycin but these discontinued as no clear signs of consolidation or infection.  Aspect of dyspnea likely secondary to heart failure exacerbation but also primarily from moderate pulmonary hypertension.  Patient improved with diuresis, supplemental oxygen and was discharged to home with a prescription to obtain  oxygen.     Acute on chronic HFpEF and CAD  BNP was mildly elevated 376, evidence of pulmonary congestion on CTA chest.  Stress test 11/13 with ejection fraction of 51% (also with known mitral and tricuspid regurgitation).  CABG x3 in 2013. Resumed home torsemide 50 mg p.o. daily. He is also on potassium chloride 10 meq two times a day. Last potassium 3.5.      Atrial fibrillation  Status post ablation 2014.  Currently rate controlled.  Medications include metoprolol XL 50 mg p.o. daily and Eliquis 5 mg p.o. twice daily. No s/sx bleeding.      Chronic pain  For chronic pain, patient takes gabapentin 300 mg p.o. every morning and 900 mg p.o. every afternoon.     Cognitive impairment with mood disorder  Patient takes mirtazapine 22.5 mg p.o. daily at bedtime along with melatonin for sleep.       PMH: reviewed in EPIC   Allergies/ADRs: reviewed in EPIC   Alcohol: not currently  Tobacco:   Social History     Tobacco Use   Smoking Status Never Smoker   Smokeless Tobacco Never Used     Recent Vitals:   BP Readings from Last 3 Encounters:   11/15/20 109/59   11/10/20 103/58   11/10/20 90/60      Wt Readings from Last 3 Encounters:   11/15/20 145 lb 8 oz (66 kg)   11/10/20 153 lb 6.4 oz (69.6 kg)   11/10/20 154 lb (69.9 kg)     Pulse Readings from Last 3 Encounters:   11/15/20 62   11/10/20 68   11/10/20 65     ----------------    The patient was given a summary of these recommendations via [employer]    I spent 15 minutes with this patient today;  . All changes were made via collaborative practice agreement with Timbo Auguste MD. A copy of the visit note was provided to the patient's provider.     Zandra Araujo, PharmD, BCACP  Medication Management (MTM) Pharmacist  [facility]       Telemedicine Visit Details    Type of service:  Telephone     Start Time: 1:15 PM  End Time (time video/phone call stopped): 1:30 PM    Originating Location (pt. Location): Home    Distant Location  (provider location):  [place] [facility]    Mode of Communication:   Telephone     Current Outpatient Medications   Medication Sig Dispense Refill     ELIQUIS 5 mg Tab tablet TAKE 1 TABLET (5 mg) BY MOUTH 2 times a day 180 tablet 0     gabapentin (NEURONTIN) 300 MG capsule Take 3 capsules (900 mg total) by mouth at bedtime.       gabapentin (NEURONTIN) 300 MG capsule Take 300 mg by mouth daily. morning       magnesium chloride (SLOW-MAG) 64 mg TbEC delayed-release tablet Take 1 tablet (64 mg total) by mouth daily.  0     melatonin 1 mg Tab tablet Take 1 mg by mouth daily with supper.        metoprolol succinate (TOPROL-XL) 25 MG TAKE 2 TABLETS BY MOUTH once DAILY 180 tablet 0     mirtazapine (REMERON) 7.5 MG tablet TAKE 1 TO 3 TABLETS BY MOUTH nightly AT BEDTIME 270 tablet 3     polyethylene glycol 3350 (MIRALAX ORAL) Take 8.5 g by mouth daily.        potassium chloride (KLOR-CON) 10 MEQ CR tablet TAKE 1 TABLET BY MOUTH 2 TIMES PER DAY  180 tablet 0     torsemide (DEMADEX) 100 MG tablet Take 50 mg by mouth daily.       valACYclovir (VALTREX) 500 MG tablet Take 500 mg by mouth daily.       No current facility-administered medications for this visit.

## 2021-06-13 NOTE — TELEPHONE ENCOUNTER
----- Message from Jenny Baptiste sent at 11/25/2020 12:32 PM CST -----  Regarding: KELLI PT / SYMPTOMS  General phone call:    Caller: Pt's dtrChula     Primary cardiologist: Kelli     Detailed reason for call: Chula states that pt has gained 8 lbs in one week and has swelling in his legs/ankles. She also said pt has not been very active today and his oxygen saturation level is anywhere between 87-94. Chula is requesting for a call back to discuss any recommendations Kelli may have.     New or active symptoms? Yes     Best phone number: [phone], pt's dtr Chula     Best time to contact: Anytime     Ok to leave a detailed message? Yes     Device? Yes     Additional Info:

## 2021-06-13 NOTE — TELEPHONE ENCOUNTER
Chula is calling back today to share that Bernard responded well to the Metolazone dose last week. He is down to 146# from 153# and the lower extremity edema is much improved.   They obtained a Hospice referral from the PCP and the nurse came out on Saturday to complete this assessment. They have decided that Bernard will sign on to the Hospice program and they are moving ahead with managing his care. He wanted to reserve the option in the future to have IV diuretics if it was deemed necessary as a supportive care. They may reach out to us in the event that this is needed.     YASEMIN to Kelli Mendoza, CNP

## 2021-06-13 NOTE — PROGRESS NOTES
"Frank Robles is a 86 y.o. male who is being evaluated via a billable video visit.      The patient has been notified of following:     \"This video visit will be conducted via a call between you and your physician/provider. We have found that certain health care needs can be provided without the need for an in-person physical exam.  This service lets us provide the care you need with a video conversation.  If a prescription is necessary we can send it directly to your pharmacy.  If lab work is needed we can place an order for that and you can then stop by our lab to have the test done at a later time.    Video visits are billed at different rates depending on your insurance coverage. Please reach out to your insurance provider with any questions.    If during the course of the call the physician/provider feels a video visit is not appropriate, you will not be charged for this service.\"    Patient has given verbal consent to a Video visit? Yes  How would you like to obtain your AVS? AVS Preference: Mail a copy.  If dropped by the video visit, the video invitation should be sent to: Send to e-mail at: [email or web address]  Will anyone else be joining your video visit? Yes: Daughter Chula. How would they like to receive their invitation? Send to e-mail at: [email or web address]        Video Start Time: 11:15 AM    Patient and daughter have 3 main questions as outlined from Kyra patient's daughter sent me before visit.  Copied below:    1) Bernard wants to know if he will always be on oxygen and why.   - Asked if he had pulmonologist appointment scheduled yet but has not been contacted about this yet.     2) POLST:  Please explain it to him. (He currently has a DNR and Advanced Directive.)  At least twice since discharge from the hospital,  he has stated very strongly that he never wants to go back to the ER or the hospital. We could schedule a separate visit this week to complete the POLST, when my brother Al in [facility] who is " "his Health Care Agent will be present.     If there is time in this visit--       3) Mirtazipine: Bernard very strongly objected to the taper-down dose because he believes that he will not be able to sleep without it, so we reverted to his original dose. He cannot remember agreeing or even discussing it with you in our post-hospital visit Nov 18, which covered many issues. I explained your thinking and suggested that he bring his concerns directly to you. (Be aware that he denies frequently \"nodding off\" in his chair during the day.) For the past 2 weeks, he has been reporting most mornings that he slept quite well during the night. This is unusual. He (and the overnight home care aides) attribute it to the oxygen therapy.      GENERAL: Healthy, alert and no distress, nasal cannula noted  EYES: Eyes grossly normal to inspection. No discharge or erythema, or obvious scleral/conjunctival abnormalities.  RESP: No audible wheeze, cough, or visible cyanosis.  No visible retractions or increased work of breathing.    NEURO: Cranial nerves grossly intact. Mentation and speech appropriate for age.  PSYCH: Mentation appears normal, affect normal/bright, judgement and insight intact, normal speech and appearance well-groomed    End time: 11:46 AM    Video-Visit Details    2. Hypoxia  3. 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 Chronic heart failure with preserved ejection fraction (H)  5. Pulmonary emphysema, unspecified emphysema type (H)  Patient having questions pertaining to his oxygen including why he is on it and how long he will need to be on it.  Discussed that I believe his need for oxygen is multifactorial from his heart failure, emphysema seen on his latest CT of his chest and pulmonary hypertension discovered on his last hospitalization.  I discussed that I do not know how long he will need to be on oxygen as it partly depends on if it is etiology is related to other factors and if the factors listed " above and these others can be treated better.  I had referred him to see a pulmonologist to help consider some of these things but still has not heard from this.  We will have nurse call him and talk with referral coordinator to figure out why this has not been scheduled yet.    1. POLST (Physician Orders for Life-Sustaining Treatment)  Patient expressing wishes consistent with a POLST that he would not want to be taken to the emergency room or hospital even if he had a life-threatening condition.  Son is his healthcare agent but was not there today.  We will schedule follow-up 40-minute video visit so we can discuss this in filiforms in detail.    6. Drowsiness  7. Insomnia, unspecified type  I recommended Bernard decrease his mirtazapine and potentially stop in an attempt to help with his symptoms of drowsiness during the day.  However he never did it this as he is concerned he will have worsening insomnia at night.  We discussed why I had recommended this and recommended he try going down to 15 mg daily from 22.5.  States he will try this and if he has worsening insomnia will go back up to original dose.    Type of service:  Video Visit    Originating Location (pt. Location): Home    Distant Location (provider location):  [facility]     Platform used for Video Visit: Beth Maier MD

## 2021-06-13 NOTE — TELEPHONE ENCOUNTER
Bernard's daughter Chula was left a VM today to advise her of the recommendations that she discuss the needs for RT evaluation, and Oxygen orders, in addition to Home Care orders with his PCP at the visit scheduled for tomorrow. sk/SENTHIL

## 2021-06-13 NOTE — PROGRESS NOTES
Assessment/Plan:      Problem List Items Addressed This Visit     Hypertension    Coronary Artery Disease    Congestive Heart Failure    Sick sinus syndrome with tachycardia    Paroxysmal atrial fibrillation - Primary        1.  Chronic Atrial fibrillation and flutter: Asymptomatic, controlled ventricular response.  Long-standing persistent atrial flutter since April 2015, now chronic.   No evidence of recurrence of atrial fibrillation since pulmonary vein isolation ablation in 2009.  He has a [id number] score of 5 for age >75, CHF, hypertension, and CAD.  He is on long-term anticoagulation with warfarin for stroke prophylaxis and his INRs have been consistently therapeutic.  We discussed options to warfarin including pros and cons of Eliquis, Pradaxa, and Xarelto.  We also discussed  left atrial appendage occlusion with the watchman device if he were to have issues on a blood thinner.  He is  potentially interested in switching to a DOAC; recommend Eliquis 5 mg twice daily.  He will look into his insurance coverage and call if he would like to switch medications.    2.  Hypertension: Blood pressure at target today.      3.  Sick sinus syndrome status post pacemaker implant: The device was interrogated and is functioning appropriately.  The battery shows 3.00 V with estimated longevity of 5 years.  Atrial and ventricular leads are stable with good sensing, impedance, and pacing thresholds.     4.  Coronary artery disease: No symptoms indicative of myocardial ischemia.    5.  Ischemic cardiomyopathy with chronic systolic heart failure: Appears well compensated with no signs of acute fluid overload on exam today.  Weight has been stable.  Previous echo showed LVEF appears stable and somewhat improved from previous despite high percentage of RV pacing.  No evidence for pacing induced cardiomyopathy, though most recent echo report is not available for review.  Will obtain this from [facility].    Follow-up in 1  year.    Subjective:      Frank Robles, a very pleasant 83 year old gentleman, comes in today accompanied by his wife for device and arrhythmia follow-up.  He originally underwent dual-chamber pacemaker implant in 1996 for sick sinus syndrome. He had chronic RA lead malfunction following CABG and evidence of insulation fracture of the RV lead. He was first diagnosed with atrial fibrillation in 2000; he underwent pulmonary vein isolation ablation in 2009. He had persistent atrial flutter since 2013, but did not undergo cardioversion due to the non-functioning atrial lead.  On 9/8/2014 he underwent removal of the right-sided pacemaker generator, capping of the RA and RV leads and implantation of a new dual-chamber pacemaker system on the left. He underwent successful cardioversion from atrial flutter after his procedure.  He had recurrence of persistent atrial flutter in April 2015 which with he was asymptomatic.  His history is also significant for coronary artery disease s/p CABG, hypertension, hyperlipidemia, cardiomyopathy, heart failure, anxiety, and depression.  LVEF was previously 34%, more recently stable at 45%. He had a repeat echo at his annual cardiology follow up at [place] [facility] in June, but is unavailable for review.  He has chronically slurred speech.      Bernard states that he continues to feel very well.  He walks at least a mile daily without difficulty.  He denies chest discomfort, palpitations, abdominal bloating/fullness or peripheral edema, shortness of breath, paroxysmal nocturnal dyspnea, orthopnea, dizziness, pre-syncope, or syncope.      Medical, surgical, family, social history, and medications were reviewed and updated as necessary.    Patient Active Problem List   Diagnosis     Cardiac Devices Pacemaker Present     Adverse Effect Of Anticholinergics     Chronic Major Depression     Frequent, Full-bladder Emptying (Polyuria)     Constipation     Insomnia     Actinic Keratosis     Lipoma Of The  Adipose Tissue     Hypertension     Coronary Artery Disease     Congestive Heart Failure     Chronic Reflux Esophagitis     Cardiomyopathy     Sick sinus syndrome with tachycardia     Pacemaker lead malfunction     Paroxysmal atrial fibrillation     Atrial flutter     Dysarthria       Past Surgical History:   Procedure Laterality Date     CARDIAC PACEMAKER PLACEMENT       KS ABLATE HEART DYSRHYTHM FOCUS      Description: Catheter Ablation Atrial Fibrillation;  Recorded: 02/05/2014;  Comments: PVI Aug 2009: (PVI-RF + roof line)     KS CABG, VEIN, SINGLE      Description: CABG (CABG);  Proc Date: 02/07/2013;  Comments: Dr. Jatinder Simpson at [facility]; ; Feb 2013: CAB X 3 (LIMA to LAD, SVG to OM, SVG to PDA); ; Reopened same day for RA bleeding     KS REMOVAL OF TONSILS,<13 Y/O      Description: Tonsillectomy;  Recorded: 03/26/2013;     TOTAL HIP ARTHROPLASTY      bilateral       Allergies   Allergen Reactions     Zolpidem Other (See Comments)     Hallucinations needed to be restrained in hospital     Diphenhydramine Hcl Other (See Comments)     tachycardia       Current Outpatient Prescriptions   Medication Sig Dispense Refill     gabapentin (NEURONTIN) 300 MG capsule TAKE 2 TO 3 CAPSULES BY MOUTH DAILY AS DIRECTED 270 capsule 6     losartan (COZAAR) 50 MG tablet Take 1 tablet (50 mg total) by mouth daily. 90 tablet 3     metoprolol succinate (TOPROL-XL) 25 MG TAKE 2 TABLETS BY MOUTH DAILY 100 tablet 1     mirtazapine (REMERON) 7.5 MG tablet TAKE 1 TO 3 TABLETS BY MOUTH AT BEDTIME 270 tablet 1     valACYclovir (VALTREX) 500 MG tablet 500 mg daily.        warfarin (COUMADIN) 5 MG tablet TAKE 1/2 TO 1 TABLET BY MOUTH DAILY. ADJUST DOSE BASED ON INR RESULTS/AS DIRECTED. 90 tablet 0     No current facility-administered medications for this visit.        Family History   Problem Relation Age of Onset     Heart attack Father 76       Social History   Substance Use Topics     Smoking status: Never Smoker     Smokeless  "tobacco: Never Used     Alcohol use 0.6 oz/week     1 Cans of beer per week     Review of Systems:   General: WNL  Eyes: WNL  Ears/Nose/Throat: WNL  Lungs: WNL  Heart: WNL  Stomach: WNL  Bladder: WNL  Muscle/Joints: WNL  Skin: WNL  Nervous System: WNL  Mental Health: WNL     Blood: WNL      Objective:      BP 96/58 (Patient Site: Right Arm, Patient Position: Sitting, Cuff Size: Adult Regular)  Pulse 88  Resp 16  Ht 5' 9.5\" (1.765 m)  Wt 166 lb (75.3 kg)  BMI 24.16 kg/m2    Physical Exam  General Appearance:  Alert, well-appearing, in no acute distress.     HEENT:  Atraumatic, normocephalic; no scleral icterus, EOM intact; the mucous membranes were pink and moist.   Chest:  Chest symmetric, spine straight.  Left subclavian pacemaker pocket with no sign of infection or tissue thinning.     Lungs:   Respirations unlabored; the lungs are clear to auscultation.   Cardiovascular:   Auscultation reveals normal first and second heart sounds with no murmurs, rubs, or gallops.  Regular rate and rhythm. No JVD.  Radial and posterior tibial pulses are intact.    Abdomen:  Soft, nontender, nondistended, bowel sounds present.     Extremities: No cyanosis, clubbing, or edema.   Musculoskeletal:  Moves all extremities.     Skin: No xanthelasma.   Neurologic: Mood and affect are appropriate. Oriented to person, place, time, and situation.       Echocardiogram done 6/20/16 at [facility]  Mild biventricular enlargement, LVEF 45%.  Abnormal ventricular septal motion due to pacing.  Moderate decrease in RV systolic function.  Estimated RVSP 29 mmHg.  Severe biatrial enlargement.  No significant valvular disease.  Compared to echocardiogram done 6/9/15, no significant changes noted.    Pacemaker Interrogation:  Pacing mode: VVIR  BPM.    Presenting rhythm: AFL- @ 60  Underlying rhythm: Atrial flutter with VR 40s-50s  Battery: 3.00 V, estimated longevity of 5 years.  V pacing 93.2%  Atrial and Ventricular leads: Stable with good " sensing, impedance, and pacing thresholds.  Arrhythmias: Persistent AF with controlled ventricular response since 4/14/15, EGM's show atrial flutter.  [place]: 100% since 4/14/15  Histograms: Good rate distribution    Lab results:  Lab Results   Component Value Date    CREATININE 1.09 09/21/2017    BUN 19 09/21/2017     (L) 09/21/2017    K 5.1 (H) 09/21/2017    CL 95 (L) 09/21/2017    CO2 21 (L) 09/21/2017     Lab Results   Component Value Date    TSH 2.07 09/21/2017     Lab Results   Component Value Date    INR 2.30 (H) 09/21/2017    INR 2.80 (H) 08/31/2017    INR 2.50 (H) 07/31/2017       Fanny Schreiber, [facility], Electrophysiology  [phone]    This note has been dictated using voice recognition software. Any grammatical, typographical, or context distortions are unintentional and inherent to the software.

## 2021-06-13 NOTE — TELEPHONE ENCOUNTER
LM for Chula to return my call to discuss Bernard's response to the Metolazone dose last week. sk/RN

## 2021-06-13 NOTE — TELEPHONE ENCOUNTER
Wellness Screening Tool  Symptom Screening:  Do you have one of the following NEW symptoms:    Fever (subjective or >100.0)?  No    A new cough?  No    Shortness of breath?  No     Chills? No     New loss of taste or smell? No     Generalized body aches? No     New persistent headache? No     New sore throat? No     Nausea, vomiting, or diarrhea?  No    Within the past 2 weeks, have you been exposed to someone with a known positive illness below:    COVID-19 (known or suspected)?  No    Chicken pox?  No    Mealses?  No    Pertussis?  No    Patient notified of visitor policy- No visitors are allowed to accompany the patient at this time. per Kelli, pt will need 1 visitor with him for appt  Pt informed to wear a mask: Yes  Pt notified if they develop any symptoms listed above, prior to their appointment, they are to call the clinic directly at [phone] for further instructions.  Yes  Patient's appointment status: Patient will be seen in clinic as scheduled on 12/2/20

## 2021-06-13 NOTE — PROGRESS NOTES
In clinic device check with follow up with Fanny Lutz CNP.  Please see link for full device report.  Patient was informed of results and next follow up during today's visit.

## 2021-06-13 NOTE — TELEPHONE ENCOUNTER
Chula was contacted with updates and recommendation for Bernard to see Kelli in clinic this week. They prefer [facility] location for appt. Wednesday appt secured. Sk/SENTHIL

## 2021-06-13 NOTE — TELEPHONE ENCOUNTER
Who is calling:  Daughter   Reason for Call:  My dads home care nurse has suggested hospice care as dad is declining quickly.  He has had several choking episodes, his CHF is the worse its ever been but declines hospital visits but he agrees with comfort care.  I would like to schedule an appointment virtually with dad to discuss choices with comfort care.   Date of last appointment with primary care: 12/2/2020  Okay to leave a detailed message: Yes

## 2021-06-13 NOTE — PROGRESS NOTES
Phone call from his daughter Chula, [phone]  Because of 8 pound weight gain his torsemide was increased from 50 mg a day to 100 mg daily for 5 days.  Weight stabilized but did not decline.  Since reducing his torsemide back  to 50 mg daily now is gaining 2 pounds  No distress  To maintain oxygen saturations above 90% is now taking 2 L nasal prong-previously 1 L  Has diagnosis of congestive heart failure with preserved ejection fraction; ejection fraction 60% by echocardiogram September 8, 2020  Plan  Take a total of 100 mg torsemide daily  We will ask Kelli Mathias  to review his medication program  Normal renal panel   Anticipate starting spironolactone 25 mg/day?

## 2021-06-13 NOTE — TELEPHONE ENCOUNTER
----- Message from Jenny Baptiste sent at 11/17/2020 10:56 AM CST -----  Regarding: ARIANNA/MATT PT - CONCERN ABOUT PULSE RATE  General phone call:    Caller: Pt's Chula marlow     Primary cardiologist: Lissette     Detailed reason for call: Chula states that pt was discharged from the hospital on 11/15 and since then, his pulse rate has been fluctuating from 120 to 60. Chula is requesting for a call back asap to discuss any recommendations Arianna might have for pt.     New or active symptoms? Yes     Best phone number: [phone]    Best time to contact: Today     Ok to leave a detailed message? Yes     Device? Yes     Additional Info:  Pt has a follow up appt with Arianna on 12/7.

## 2021-06-13 NOTE — PATIENT INSTRUCTIONS - HE
Frank Robles,    It was a pleasure to see you today at the [facility].     My recommendations after this visit include:  - You will be called with the results of your labs.    - Please call my nurse Eliz if you have any concerns: [phone]    Kelli Mendoza, CNP

## 2021-06-13 NOTE — TELEPHONE ENCOUNTER
Increase torsemide to 100 mg daily for 3 days and then return to 50 mg daily.  If symptoms worsen, he should call back.  He monitors O2 at home and if it stays in the 80s or lower, he should go to ED.    Please follow up with him on Monday.

## 2021-06-13 NOTE — TELEPHONE ENCOUNTER
Informed patients daughter that Dr. Maier put in a referral for hospice consult and gave her the number of  [phone] to see if they can get the evaluation set up.

## 2021-06-13 NOTE — TELEPHONE ENCOUNTER
Daughter Chula is calling today to alert us that she needed to call in over the weekend, see notes. He was increased back to Torsemide 100mg daily, due to persistent wt gain. She is asking to have direction on if you feel this is an acute exacerbation of his CHF, verses an advancement or progression in his CHF/pulmonary HTN status that they may need to be looking at Hospice. He remains on the supplemental oxygen.  They are planning a Zoom meeting of the family to discuss his ongoing needs and looking for some insight.    Thank you,  Eliz /SENTHIL

## 2021-06-13 NOTE — TELEPHONE ENCOUNTER
Dr. Auguste-  I forgot to see if you are okay with the wound care for his skin.   He has a skin tear on his left hand (1.6cm x0.7cm) & right lower leg (2cm x1.5cm). Requesting orders to cleanse with NS or wound cleanser (or let soapy water run over them in shower), pat dry, apply bacitracin to open area, cover with mepilex border (ok to use gauze and secure with tape until that arrives). Also has a stage 1 PU on back from hospital. Instructed to keep covered with gauze or mepilex border to provider protection/cushion until heals. Okay for caregivers to perform cares when nursing staff not present.     Please let me know if you think the above recommendations are appropriate.   Thank you!  Yu Alejo RN

## 2021-06-13 NOTE — PROGRESS NOTES
Assessment/ Plan     1. Cough  Likely a viral respiratory infection  Consider that this may be related to his ACE inhibitor    Recommend symptomatic treatment as his symptoms appear to be improving  If having ongoing symptoms recommend follow-up for chest x-ray    Addendum: Patient checked with his cardiologist at the [facility] and they recommend a trial of losartan with discontinuation of lisinopril    2. Bilateral cold feet    Check laboratory testing    Recommend increase physical activity as tolerated  - Comprehensive Metabolic Panel  - Thyroid Cascade  - Vitamin B12    Consider follow-up with the vascular clinic for further evaluation    3. Coronary artery disease    Continue metoprolol and losartan  Check laboratory testing  - LDL Cholesterol, Direct  Review notes from cardiology    4. Atrial fibrillation    Continue warfarin  - INR    25 minutes were spent with the patient and greater than 50% of the time was spent in face to face counseling and coordination of care        Subjective:       Frank Robles is a 83 y.o. male who presents to the clinic with multiple concerns.  Essentially, he has had a cough for the past 2-3 weeks.  This has generally been nonproductive.  This can happen on an intermittent basis.  He has not had a fever, chills, or obvious shortness of breath.  He typically can walk 25 minutes at a time without significant difficulty.  Also, he notes that he has had cold feet in general.  He denies any pain in his calves with walking.  He has not had an increase in lower extremity swelling.    He has a complex medical history including ischemic cardiomyopathy, previous coronary artery bypass graft surgery, and atrial flutter followed by cardiology. He has a known ischemic cardiomyopathy. He has had previous coronary artery bypass graft surgery and has had sick sinus syndrome with pacemaker placement. His previous surgery was complicated by a left atrial appendage laceration which required a  "second operation. He developed a left phrenic neuropathy.  His more recent echocardiogram showed a decline in his ejection fraction to 34% and later improvement in his ejection fraction to 45%. He continues to follow up with cardiology. He does have a history of atrial fibrillation with previous pulmonary vein isolation ablation in the past. He did have atrial flutter and required cardioversion.  He has also followed up with the [facility] for dysarthria.  This has been progressive.     The following portions of the patient's history were reviewed and updated as appropriate: allergies, current medications, past family history, past medical history, past social history, past surgical history and problem list.    Review of Systems   A 12 point comprehensive review of systems was negative except as noted.      Current Outpatient Prescriptions   Medication Sig Dispense Refill     gabapentin (NEURONTIN) 300 MG capsule TAKE 2 TO 3 CAPSULES BY MOUTH DAILY AS DIRECTED 270 capsule 6     metoprolol succinate (TOPROL-XL) 25 MG TAKE 2 TABLETS BY MOUTH DAILY 100 tablet 1     mirtazapine (REMERON) 7.5 MG tablet TAKE 1 TO 3 TABLETS BY MOUTH AT BEDTIME 270 tablet 1     valACYclovir (VALTREX) 500 MG tablet 500 mg daily.        warfarin (COUMADIN) 5 MG tablet TAKE 1/2 TO 1 TABLET BY MOUTH DAILY. ADJUST DOSE BASED ON INR RESULTS/AS DIRECTED. 90 tablet 0     losartan (COZAAR) 50 MG tablet Take 1 tablet (50 mg total) by mouth daily. 90 tablet 3     No current facility-administered medications for this visit.        Objective:      /62  Pulse 78  Temp 97.3  F (36.3  C) (Oral)   Resp 16  Ht 5' 8\" (1.727 m)  Wt 166 lb (75.3 kg)  SpO2 97%  BMI 25.24 kg/m2      General appearance: alert, appears stated age and cooperative  Head: Normocephalic, without obvious abnormality, atraumatic  Eyes: conjunctivae/corneas clear. PERRL, EOM's intact.   Ears: normal TM's and external ear canals both ears  Nose: Nares normal. Septum midline. " Mucosa normal. No drainage or sinus tenderness.  Throat: lips, mucosa, and tongue normal; teeth and gums normal  Neck: no adenopathy, supple, symmetrical, trachea midline and thyroid not enlarged, symmetric, no tenderness/mass/nodules  Lungs: clear to auscultation bilaterally  Heart: regular rate and rhythm, S1, S2 normal, no murmur, click, rub or gallop  Abdomen: soft, non-tender; bowel sounds normal; no masses,  no organomegaly  Extremities: extremities normal, atraumatic, no cyanosis or edema  Pulses: 2+ and symmetric  Skin: Skin color, texture, turgor normal. No rashes or lesions  Lymph nodes: Cervical, supraclavicular, and axillary nodes normal.  Neurologic: Alert and oriented X 3. Normal symmetric reflexes. Normal coordination and gait         No results found for this or any previous visit (from the past 168 hour(s)).       This note has been dictated using voice recognition software. Any grammatical or context distortions are unintentional and inherent to the software

## 2021-06-13 NOTE — TELEPHONE ENCOUNTER
Chula was contacted with the recommendations to increase Bernard's Torsemide to 100mg daily x 3 days then return to 50mg daily.   They may start today with an additional 50mg dose this afternoon. Then give 100mg once daily in AM Th and Fri.     Monitor oxygen levels if 80 or below and no improvement seek evaluation in Emergency Room   Nurse to call on Monday for updates. sk/RN

## 2021-06-13 NOTE — PROGRESS NOTES
HPI:   Frank Robles is a 86 y.o.  male who presents for:    Chief Complaint   Patient presents with     Shortness of Breath     Patient is SOB when moving but fine once he sits down, First noticed on saturday     Patient went to HF clinic today and noted increasing shortness of breath. Not felt to be HF exacerbation per note. Copied HPI from note as below:    He comes into clinic today with increased shortness of breath with activity.  Shortness of breath started about 4 days ago.  His oxygen has been checked a few times at home and is as low as 83% with activity.  He recovers quickly with oxygen returning into mid 90s and shortness of breath resolves.  With walking into clinic today, his oxygen was 81% when checked by medical assistant.  It was monitored throughout his appointment and remained 91-98% at rest. He denies shortness of breath at rest.  He notes some shortness of breath walking into clinic but overall, he states that he feels well.   He denies fatigue, lightheadedness, orthopnea, palpitations, chest pain and lower extremity edema    Now:  Patient tells me very similar story in that he has had shortness of breath primarily with exertion for the approximately the last 4 days.  Denies orthopnea, flulike symptoms, changes in smell or taste, lower extremity edema, chest pain, palpitations.  Possibly some fatigue but really not new.  Patient does not have a smoking history         PMHX:     Patient Active Problem List   Diagnosis     Biventricular cardiac pacemaker in situ     Adverse Effect Of Anticholinergics     Chronic Major Depression     Frequent, Full-bladder Emptying (Polyuria)     Constipation     Insomnia     Actinic Keratosis     Lipoma Of The Adipose Tissue     Hypertension     Coronary Artery Disease     Persistent Atrial Fibrillation     Chronic Reflux Esophagitis     Ischemic cardiomyopathy     Sick sinus syndrome with tachycardia (H)     Atrial flutter (H)     Dysarthria     Heart  failure with preserved ejection fraction (H)     Third degree AV block (H)     Complete AV block due to AV amira ablation (H)     History of skin cancer     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       gabapentin (NEURONTIN) 300 MG capsule Take 300 mg by mouth daily. morning       MELATONIN ORAL Take 3 mg by mouth at bedtime. Takes 1/4 tablet       metoprolol succinate (TOPROL-XL) 25 MG TAKE 2 TABLETS BY MOUTH once DAILY 180 tablet 0     mirtazapine (REMERON) 7.5 MG tablet TAKE 1 TO 3 TABLETS BY MOUTH nightly AT BEDTIME 270 tablet 3     polyethylene glycol 3350 (MIRALAX ORAL) Take by mouth. 1/2 a dose daily       potassium chloride (KLOR-CON) 10 MEQ CR tablet TAKE 1 TABLET BY MOUTH 2 TIMES PER DAY  180 tablet 0     torsemide (DEMADEX) 100 MG tablet Take 50 mg by mouth daily.       No current facility-administered medications for this visit.        Social History     Tobacco Use     Smoking status: Never Smoker     Smokeless tobacco: Never Used   Substance Use Topics     Alcohol use: Not Currently     Alcohol/week: 1.0 standard drinks     Types: 1 Cans of beer per week     Drug use: No       Social History     Social History Narrative     Not on file       Allergies   Allergen Reactions     Zolpidem Other (See Comments)     Hallucinations needed to be restrained in hospital     Beta-Blockers (Beta-Adrenergic Blocking Agts) Other (See Comments)     Fatigue if metoprolol > 12.5 mg     Diphenhydramine Hcl Other (See Comments)     tachycardia              Review of Systems:    Complete ROS is negative except as noted in the HPI         Physical Exam:   /58 (Patient Site: Left Arm, Patient Position: Sitting, Cuff Size: Adult Regular)   Pulse 68    Temp 97.2  F (36.2  C) (Oral)   Resp 20   Wt 153 lb 6.4 oz (69.6 kg)   SpO2 91%   BMI 22.65 kg/m      General appearance: Alert, cooperative, dyspneic appearing, appears stated age  Head: Normocephalic, atraumatic, without obvious abnormality  Eyes: Pupils equal round, reactive.  Conjunctiva clear.  Nose: Nares normal, no drainage.  Throat: Lips, mucosa, tongue normal mucosa pink and moist  Neck: Supple, symmetric, trachea midline, no adenopathy.  No thyroid enlargement, tenderness or nodules.    Back: Symmetric, no CVA tenderness.  Lungs: Decreased breath sounds throughout.  Patient using intercostal muscles for breathing.  Dyspneic.  Tachypnea.  No wheezing or crackles heard.  Heart: Regular rate and rhythm, murmur present  Extremities: Extremities normal, atraumatic.  No cyanosis or edema.  Skin: Skin color, texture, turgor normal no rashes or lesions on limited skin exam  Neurologic: CN II through XII intact, normal strength.    Assessment and Plan   1. Hypoxemia  Patient presents from heart failure clinic with concerns of increased shortness of breath over the last 4 days.  Not felt to be heart failure at that clinic.  He does have stable weight and no outward signs of third spacing.  However murmur on exam today that patient does not believe he had before.  Decreased breath sounds throughout and tachypneic and using intercostal muscles for breathing.  He also was hypoxic into the 70s after walking in which improved to 91 with sitting in the chair.  Given this sent him to emergency room as I feel he needs more of a thorough evaluation with blood work and chest x-ray.  Due to how long Covid test are taking wanted to get this started as soon as possible so obtain that here before sending him.  - Symptomatic COVID-19 Virus (CORONAVIRUS) PCR; Future  - Symptomatic COVID-19 Virus (CORONAVIRUS) PCR    Follow up: PRN  Options for treatment and follow-up care were reviewed with the patient and/or guardian. Frank  ERNESTO Harok and/or guardian engaged in the decision making process and verbalized understanding of the options discussed and agreed with the final plan.    Dr. Hamzah Jim MD

## 2021-06-13 NOTE — TELEPHONE ENCOUNTER
Chula the patient's daughter is calling in today to report that due to Bernard not swallowing medications well, they need to alter the forms of his potassium Rx so that it can be crushed. Also they needed additional Torsemide tablets since the dosage has increased. Their pharmacist has recommended the K-Dur instead of the KLor Con tabs, is this okay with you?    Also when do you wish to see Bernard back in clinic for f/u ? sk/SENTHIL

## 2021-06-13 NOTE — TELEPHONE ENCOUNTER
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 Requesting SN orders for 1w4, 2 prn for skin assessment, resp assessment, falls/safety in home. Let me know if you are okay with the above. Thank you!  Yu Alejo RN  [phone]

## 2021-06-13 NOTE — PROGRESS NOTES
"  Chief Complaint   Patient presents with     Check ears         HPI:   Frank Robles is a 83 y.o. male in requesting his ears be checked.  He is having no symptoms--he notes he hasn't had his ears cleaned in at least two years.        Medications:  Current Outpatient Prescriptions on File Prior to Visit   Medication Sig Dispense Refill     gabapentin (NEURONTIN) 300 MG capsule TAKE 2 TO 3 CAPSULES BY MOUTH DAILY AS DIRECTED 270 capsule 6     losartan (COZAAR) 50 MG tablet Take 1 tablet (50 mg total) by mouth daily. 90 tablet 3     metoprolol succinate (TOPROL-XL) 25 MG TAKE 2 TABLETS BY MOUTH DAILY 100 tablet 1     mirtazapine (REMERON) 7.5 MG tablet TAKE 1 TO 3 TABLETS BY MOUTH AT BEDTIME 270 tablet 1     valACYclovir (VALTREX) 500 MG tablet 500 mg daily.        warfarin (COUMADIN) 5 MG tablet TAKE 1/2 TO 1 TABLET BY MOUTH DAILY. ADJUST DOSE BASED ON INR RESULTS/AS DIRECTED. 90 tablet 0     No current facility-administered medications on file prior to visit.          Social History:  Social History   Substance Use Topics     Smoking status: Never Smoker     Smokeless tobacco: Never Used     Alcohol use 0.6 oz/week     1 Cans of beer per week         Physical Exam:   Vitals:    10/26/17 1211   BP: 124/72   Pulse: 88   Resp: 16   Temp: 98.2  F (36.8  C)   TempSrc: Oral   Weight: 166 lb (75.3 kg)   Height: 5' 9.5\" (1.765 m)       GEN:  NAD  EaRS:  Tortuous canals--nonerythematous. TM\"S pearly gray with normal landmarks.  No wax        Assessment/Plan:    1. Normal ear     2. Atrial fibrillation  INR      Ear exam normal.    INR drawn today.      The following portions of the patient's history were reviewed and updated as appropriate: allergies, current medications, past family history, past medical history, past social history, past surgical history and problem list.    Dilshad Khan MD      10/26/2017        "

## 2021-06-13 NOTE — PATIENT INSTRUCTIONS - HE
Frank Robles,    It was a pleasure to see you today at the [facility].     My recommendations after this visit include:  - Please see primary care provider today about breathing.    - Please call my nurse Eliz if you have any concerns: [phone]    Kelli Mendoza, CNP

## 2021-06-13 NOTE — TELEPHONE ENCOUNTER
Informed Ninfa from [facility] for requested orders per Dr. Auguste.  Ninfa voiced understanding.

## 2021-06-13 NOTE — PROGRESS NOTES
"Frank Robles is a 86 y.o. male who is being evaluated via a billable video visit.      The patient has been notified of following:     \"This video visit will be conducted via a call between you and your physician/provider. We have found that certain health care needs can be provided without the need for an in-person physical exam.  This service lets us provide the care you need with a video conversation.  If a prescription is necessary we can send it directly to your pharmacy.  If lab work is needed we can place an order for that and you can then stop by our lab to have the test done at a later time.    Video visits are billed at different rates depending on your insurance coverage. Please reach out to your insurance provider with any questions.    If during the course of the call the physician/provider feels a video visit is not appropriate, you will not be charged for this service.\"    Patient has given verbal consent to a Video visit? Yes  How would you like to obtain your AVS? AVS Preference: MyChart.  If dropped by the video visit, the video invitation should be sent to: Send to e-mail at: [email or web address]   Will anyone else be joining your video visit? No    Video Start Time: 9:05    Aspiration event:  history obtained via patient, Patient's care assistant, daughter.  Patient was eating cereal this morning when he \"got cereal down the wrong tube\" and started having a coughing fit.  His oximeter was put on him and his oxygen dropped down to the 40s with this.  The nurses increase his oxygen up to 3-1/2 L to get his oxygen back up to the 90s after a few minutes.  He has continued to need this higher amount of oxygen to maintain his oxygen saturation above 88%.  He has been at 2 L for the last couple of days due to increasing oxygen needs thought to be related to his heart failure as he has also gained weight.  He has a cardiology appointment at 2 PM today to discuss this more with his cardiologist.  Son and " daughter wondering if she should he should have more evaluation for this and wondering about things like a Heimlich maneuver if should be performed in the future.    POLST:  This visit was scheduled for POLST form completion and discussion.  Spent a significant amount of time doing this.    Anxiety:  Patient's care assistant notes that he has been having some fits of anxiety which he described as him breathing fast his hands trembling and talking fast.  They have been doing exercises with slow breathing as well as orientation activities that they do every day to help with this.  Patient's care sister is wondering if he should have some medicine to help with anxiety during the day.  He is already on mirtazapine 22.5 mg at night to help him sleep.    Additional provider notes:   GENERAL: Healthy, alert and no distress, nasal cannula in place  EYES: Eyes grossly normal to inspection. No discharge or erythema, or obvious scleral/conjunctival abnormalities.  RESP: No audible wheeze, cough, or visible cyanosis.  No visible retractions or increased work of breathing.    NEURO: Cranial nerves grossly intact. Mentation and speech appropriate for age.  PSYCH: Mentation appears normal, affect normal/bright, judgement and insight intact, normal speech and appearance well-groomed    Video end time:  10:09 AM      Video-Visit Details  1. Aspiration pneumonia, unspecified aspiration pneumonia type, unspecified laterality, unspecified part of lung (H)  We will empirically treat Bernard for aspiration pneumonia with Augmentin as below.  Patient pursuing more of a comfort measures approach to cares and so would not want to get sent to the ER for work-up for this or admitted for IV antibiotics.  Discussed in further detail as below.  - amoxicillin-clavulanate (AUGMENTIN) 875-125 mg per tablet; Take 1 tablet by mouth 2 (two) times a day for 7 days.  Dispense: 14 tablet; Refill: 0    2. POLST (Physician Orders for Life-Sustaining  Treatment)  3. Patient has active physician orders for life-sustaining treatment (POLST) form  Completed forms today for POLST.  Patient wishes to be DNR/DNI and only comfort measures instead of life-sustaining treatment.  There are some caveats to this which are outlined in the POLST form and completed with them.    4. Anxiety  Anxiety seems mild as of now. Would recommend Behavioral therapies with personal assistants for now. If worsening then would consider starting medications but would be wary of making him have more drowsiness during the day.     Type of service:  Video Visit    Originating Location (pt. Location): Home    Distant Location (provider location):  [facility]     Platform used for Video Visit: Beth Maier MD

## 2021-06-13 NOTE — TELEPHONE ENCOUNTER
Who is calling:  Patient's daughter   Reason for Call:  Patients daughter Chula sent a message through her father's MyChart. She was only able to address it to Dr. Auguste's team. She wanted to make sure that Dr. Maier goes over the message before his appointment today.     Okay to leave a detailed message: Yes

## 2021-06-13 NOTE — TELEPHONE ENCOUNTER
Kdur KCL supplement ordered in place of Klor Con tabs with instructions not to crush, but to dissolve in water and then mix into applesauce etc.   Reorder of Torsemide tablets per pt request. sk/RN

## 2021-06-13 NOTE — TELEPHONE ENCOUNTER
Medication Request  Medication name: mirtazapine (REMERON) 7.5 MG tablet 21 tablet 0 11/18/2020 12/2/2020 No  Sig - Route: Take 2 tablets (15 mg total) by mouth at bedtime for 7 days, THEN 1 tablet (7.5 mg total) at bedtime for 7 days. - Oral  Class: No Print  Requested Pharmacy: [facility] in [facility]  Reason for request: Th patient needs this medication in a disintegrating tablets form to difficulty with swallowing recently.   When did you use medication last?:  Has been on this before just needs this in a new form.  Patient offered appointment:  patient declined  Okay to leave a detailed message: yes

## 2021-06-13 NOTE — TELEPHONE ENCOUNTER
Chula is contacted to advise that he take the Metolazone 2.5mg x1 today as per Kelli Mendoza CNP. Advised that HF nurse will be f/u with her on Monday for status updates. She has the weekend Nurse Line numbers and parameters of when to call with progressive symptoms. Reinforced that only one dose of this Metolazone to be used. Save the remaining doses until directed to use in the future. sk/RN

## 2021-06-13 NOTE — TELEPHONE ENCOUNTER
Patient has order for home care PT/OT.  The family does not need those services.  The patient has a 24 hour PCA to assist with that.  Please discontinue order.  Family does want a new order for skilled nursing post hospital for pneumonia please  Orders being requested: Skilled nursing ok to eval and treat  Reason service is needed/diagnosis: post hospitalization  When are orders needed by: ASAP  Where to send Orders: Phone:  [phone]  Okay to leave detailed message?  Yes

## 2021-06-13 NOTE — TELEPHONE ENCOUNTER
Who is calling:  Chula  Reason for Call:  She is wanting to expedite a video visit to get her father in for Hospice. Care and she was hoping that they could use the Video visit that was done on Mon or Tuesday to get those orders in for this care. She also wanted to let you know that there is some new lab results as well.  She is really hoping to here from Dr. Maier or someone on his team today because she would like to get him in this weekend if at all possible  Date of last appointment with primary care: 11/30 and 12/1  Okay to leave a detailed message: Yes

## 2021-06-13 NOTE — TELEPHONE ENCOUNTER
Yes, that treatment plan sounds appropriate. Please let me know if there are ongoing concerns. He saw my partner Dr. Jim in clinic today.

## 2021-06-14 NOTE — TELEPHONE ENCOUNTER
Reason contacted:  Anticoagulation question  Information relayed:  Notified Ina of Dr. Hartman's message   Additional questions:  No  Further follow-up needed:  No  Okay to leave a detailed message:  No

## 2021-06-14 NOTE — TELEPHONE ENCOUNTER
Reason contacted:  Medication question  Information relayed:    Ina is calling from [facility]  Phone number: [phone]     Ina is wondering if Dr. Maier thinks patient should continue Eliquis 5 mg twice daily or if he should initiate ASA therapy in place of Eliquis?     Please advise   Additional questions:  No  Further follow-up needed:  Yes  Okay to leave a detailed message:  Yes - [phone]

## 2021-06-17 NOTE — TELEPHONE ENCOUNTER
Telephone Encounter by Eliz Lawson RN at 11/30/2020 10:54 AM     Author: Eliz Lawson RN Service: -- Author Type: Registered Nurse    Filed: 11/30/2020 10:54 AM Encounter Date: 11/30/2020 Status: Signed    : Eliz Lawson RN (Registered Nurse)        for Chula to return call to discuss plan for Bernard. Sk/RN    Kelli Mathias, Eliz Hurst RN   Caller: Unspecified (Today,  8:05 AM)             I reached out to Dr. Hartman to update him on Bernard's worsening health and am waiting to hear back.  Please have Bernard follow up with me in clinic this week. Please allow 1 family member to come with him.   Thanks

## 2021-06-17 NOTE — TELEPHONE ENCOUNTER
Date of Death: 5/12/2021 at 4:43 pm due to natural causes   County of Death: [facility]  Reporting Person (Full name, relationship, phone) [facility]       Surviving Spouse or Parent (under 18) ; Full name, date of birth, phone & address): Dominique Robles       Phone number for Roseanna [phone]

## 2021-06-17 NOTE — TELEPHONE ENCOUNTER
----- Message from Danae Castro RDCS sent at 5/13/2021  8:16 AM CDT -----  Regarding: device RN review  [facility]  Alert for VT yesterday 5pm, in progress at that time, rate to 270 bpm    Type: alert remote CRT-P transmission for VT detected and long AT/AF.  Presenting rhythm: AF or atrial lead noise with VT/VF rate 200 bpm, intermittent ventricular undersensing.  Battery/lead status: stable  Arrhythmias: since 5/5/21, AF is persistent, burden 100%. Two ventricular high rate (VHR) episodes and 8 nonsustained VHR; appears to be one continuous VT episode in progress since 5/12/21 at 4:08pm with intermittent ventricular undersensing, rate 250-270 bpm.  Anticoagulant: apixaban  Comments: normal pacemaker function. BiV pacing 85% past week. Routed to device RN. prd       Call placed to patient home to check on his well-being based on presenting rhythm of rapid VT. Spoke with patient's wife who tells me he passed away last evening. States he was placed on Hospice, passed last evening and cremation/organ donation was already in place. My condolences given. Denies any questions or concerns for me at this time.   Will update chart to reflect patient's passing.     Rosita Hunt RN

## 2021-06-17 NOTE — PROGRESS NOTES
Patient been in hospice for the past 11 days  Had a peaceful sudden death 5-  Device interrogation showed that he went to a rapid ventricular tachycardia that was present until termination of the recording  Discussed with his wife and daughter and they were comfortable with his passing especially since it was quick and peaceful  Terminal arrhythmia was rapid VT/flutter

## 2021-06-18 NOTE — LETTER
Letter by Radha Landa at      Author: Radha Landa Service: -- Author Type: --    Filed:  Encounter Date: 1/7/2019 Status: (Other)       Frank Robles  [address] [address]      January 7, 2019      Dear Mr. Robles,    RE: Remote Results    We are writing to you regarding your recent Remote Pacemaker check from home. Your transmission was received successfully. Battery status is satisfactory at this time.     Your results are within normal limits.    Your next device appointment will be a remote check on April 10, 2019.  You can choose the time of day you wish to transmit.    To schedule or reschedule, please call [phone] and press 1.    NOTE: If you would like to do an extra transmission, please call [phone] and press 3 to speak to a nurse BEFORE transmitting. This ensures that the Device Clinic staff is aware of the reason you are sending a transmission, and can follow-up with you after it has been reviewed.    We will be checking your implanted device from home (remotely) every three months unless otherwise instructed. We will need to see you in the clinic at least once a year. You may need to be seen in the clinic sooner depending on the results of your check.    Please be aware:    The follow-up schedule is like a Physician prescription.    Your remote monitor is paired to your specific implanted device.      Sincerely,    [facility]

## 2021-06-18 NOTE — PROGRESS NOTES
Assessment and Plan:       1. Medicare annual wellness visit, subsequent    Mini cog score is 3-5  Reviewed falls risk  Reviewed healthcare directives  PHQ-2 score is 0  Reviewed healthcare risk assessment form    Received the Shingrix/shingles vaccine previously and will have booster for that in the near future    2. Coronary atherosclerosis  3. Hypertension  4. Cardiac Devices Pacemaker Present  5. Ischemic cardiomyopathy  6. Atrial flutter, unspecified type    Reviewed records from the [facility]  Continue furosemide 20 mg 1-2 times a day  He was advised to increase to twice a day but has not tolerated urinary frequency  Recommend that he monitor his daily weights  Continue metoprolol 50 mg daily  Continue lisinopril 20 mg daily    He has followed up with the [facility].  Reviewed laboratory testing and recent echocardiogram  There are plans of following up with Dr. David Hartman, [facility] to establish care as they do not wish to continue to travel down to [place] to the [facility]    Reviewed warning signs recommend immediate follow-up if there are concerns with weight, increasing shortness of breath, or other concerns    7. Dysarthria    Follow-up with the [facility]    8. Pain  9. Neuropathy    Continue gabapentin  - gabapentin (NEURONTIN) 300 MG capsule; Take 1 capsule (300 mg total) by mouth 3 (three) times a day. May take 2-3 capsules by mouth daily, sotelo snot need to be 3 separate times  Dispense: 270 capsule; Refill: 3        The patient's current medical problems were reviewed.    I have had an Advance Directives discussion with the patient.  The following health maintenance schedule was reviewed with the patient and provided in printed form in the after visit summary:   Health Maintenance   Topic Date Due     DEPRESSION FOLLOW UP  07/17/1934     ZOSTER VACCINE  07/17/1994     ADVANCE DIRECTIVES DISCUSSED WITH PATIENT  04/22/2018     FALL RISK ASSESSMENT  05/18/2018     TD 18+ HE   06/06/2024     PNEUMOCOCCAL POLYSACCHARIDE VACCINE AGE 65 AND OVER  Completed     INFLUENZA VACCINE RULE BASED  Completed     PNEUMOCOCCAL CONJUGATE VACCINE FOR ADULTS (PCV13 OR PREVNAR)  Completed        Subjective:   Chief Complaint: Frank Robles is an 83 y.o. male here for an Annual Wellness visit.   HPI: This is a pleasant 83-year-old male who presents to the clinic with his wife Dedra for an annual wellness visit.    As noted, he has a complex medical history including ischemic cardiomyopathy, previous coronary artery bypass graft surgery, and atrial flutter followed by cardiology at the [facility].  He has a known ischemic cardiomyopathy.  He has had previous bypass surgery and has had sick sinus syndrome treated with pacemaker placement.  A previous surgery was complicated by a left atrial appendage laceration which required a second operation.  He developed a left phrenic neuropathy.  He did have a history of a atrial fibrillation with previous pulmonary vein isolation ablation.  He had atrial flutter which required cardioversion.      His most recent echocardiogram showed decline in his ejection fraction to 34% with improvement recently to 41%.  Laboratory testing done at the [facility] recently as well.  His cholesterol panel showed a total cholesterol 148 with an LDL of 83.  HDL was 96.  TSH was normal at 3.8, creatinine was 1.08 with a GFR of 63.  His sodium was 138 and potassium was 4.8.    He was advised to increase furosemide to 20 mg twice a day but notes that he has not tolerated the urinary frequency.  He has been monitoring his weight on a daily basis.  His metoprolol was increased to 50 mg a day.  He has taken lisinopril as well.  He also takes Eliquis.    He does have history of depression which has been stable and has had some sleep concerns.  He has been taking mirtazapine 7.5 mg, 3 at night.    It should be noted that he does follow-up with the [facility] for dysarthria as well.  This has  generally been stable.  He denies any difficulty with swallowing.    He has been compliant with gabapentin and takes 300 mg in the morning and 60 mg in the evening.  This is utilized for lower extremity pain which has been stable.  At times he can have some concerns with short-term memory but this has not been a major problem.  There have been no significant safety concerns.    Review of systems otherwise negative for headaches, recent chest pain, bowel changes, or generalized weakness.  He has healthcare risk assessment form was completed.  He does not require any assistance with activities of daily living.  He has remained physically active.        Review of Systems:    Please see above.  The rest of the review of systems are negative for all systems.    Patient Care Team:  Timbo Auguste MD as PCP - General (Family Medicine)     Patient Active Problem List   Diagnosis     Cardiac Devices Pacemaker Present     Adverse Effect Of Anticholinergics     Chronic Major Depression     Frequent, Full-bladder Emptying (Polyuria)     Constipation     Insomnia     Actinic Keratosis     Lipoma Of The Adipose Tissue     Hypertension     Coronary Artery Disease     Congestive Heart Failure     Chronic Reflux Esophagitis     Ischemic cardiomyopathy     Sick sinus syndrome with tachycardia     Pacemaker lead malfunction     Paroxysmal atrial fibrillation     Atrial flutter     Dysarthria     Past Medical History:   Diagnosis Date     Actinic keratosis      Acute posthemorrhagic anemia      Anemia      Anxiety disorder due to general medical condition      Atrial flutter      CAD (coronary artery disease)      Cardiomyopathy      CHF (congestive heart failure)      Chronic reflux esophagitis      Constipation      Decubitus ulcer      Fatigue      Fatigue      Foot pain      Hemorrhoids      HTN (hypertension)      Insomnia      Lipoma     adipose tissue     Major depression, chronic      Pacemaker lead malfunction  9/8/2014     Pleural effusion      Polyuria      Slurred speech     few months after open heart     SSS (sick sinus syndrome)      Transient global amnesia      Vasovagal syncope       Past Surgical History:   Procedure Laterality Date     CARDIAC PACEMAKER PLACEMENT       SD ABLATE HEART DYSRHYTHM FOCUS      Description: Catheter Ablation Atrial Fibrillation;  Recorded: 02/05/2014;  Comments: PVI Aug 2009: (PVI-RF + roof line)     SD CABG, VEIN, SINGLE      Description: CABG (CABG);  Proc Date: 02/07/2013;  Comments: Dr. Jatinder Simpson at [facility] ; Feb 2013: CAB X 3 (LIMA to LAD, SVG to OM, SVG to PDA); ; Reopened same day for RA bleeding     SD REMOVAL OF TONSILS,<13 Y/O      Description: Tonsillectomy;  Recorded: 03/26/2013;     TOTAL HIP ARTHROPLASTY      bilateral      Family History   Problem Relation Age of Onset     Heart attack Father 76      Social History     Social History     Marital status:      Spouse name: N/A     Number of children: N/A     Years of education: N/A     Occupational History     Not on file.     Social History Main Topics     Smoking status: Never Smoker     Smokeless tobacco: Never Used     Alcohol use 0.6 oz/week     1 Cans of beer per week     Drug use: No     Sexual activity: Not on file     Other Topics Concern     Not on file     Social History Narrative      Current Outpatient Prescriptions   Medication Sig Dispense Refill     ELIQUIS 5 mg Tab tablet TAKE 1 TABLET BY MOUTH TWICE DAILY. 180 tablet 1     furosemide (LASIX) 20 MG tablet Take 20 mg by mouth 2 (two) times a day. Patient currently takes one daily       gabapentin (NEURONTIN) 300 MG capsule Take 1 capsule (300 mg total) by mouth 3 (three) times a day. May take 2-3 capsules by mouth daily, sotelo snot need to be 3 separate times 270 capsule 3     lisinopril (PRINIVIL,ZESTRIL) 20 MG tablet TK 1 T PO  D  3     MELATONIN ORAL Take by mouth.       metoprolol succinate (TOPROL-XL) 25 MG TAKE 2 TABLETS BY MOUTH DAILY  "100 tablet 1     mirtazapine (REMERON) 7.5 MG tablet TAKE 1 TO 3 TABLETS BY MOUTH AT BEDTIME 270 tablet 1     valACYclovir (VALTREX) 500 MG tablet 500 mg daily.        No current facility-administered medications for this visit.       Objective:   Vital Signs:   Visit Vitals     /70     Pulse 72     Temp 97.4  F (36.3  C) (Oral)     Resp 16     Ht 5' 8\" (1.727 m)     Wt 167 lb (75.8 kg)     BMI 25.39 kg/m2        VisionScreening:  No exam data present     PHYSICAL EXAM  General appearance: alert, appears stated age and cooperative  Voice is hoarse which is his baseline  Head: Normocephalic, without obvious abnormality, atraumatic  Eyes: conjunctivae/corneas clear. PERRL, EOM's intact.   Ears: normal TM's and external ear canals both ears  Nose: Nares normal. Septum midline. Mucosa normal. No drainage or sinus tenderness.  Throat: lips, mucosa, and tongue normal; teeth and gums normal  Neck: no adenopathy, supple, symmetrical  Back: symmetric, no curvature. ROM normal. No CVA tenderness.  Lungs: clear to auscultation bilaterally  Heart: regular rate and rhythm, S1, S2 normal, no murmur, click, rub or gallop  Abdomen: soft, non-tender; bowel sounds   Genitourinary: Deferred by patient  Rectal: Deferred by patient  Extremities: extremities normal, atraumatic, no cyanosis or edema  Skin: Skin color, texture, turgor normal. No rashes or lesions  Lymph nodes: Cervical nodes normal.  Neurologic: Alert and oriented X 3. Normal coordination and gait      Assessment Results 6/11/2018   Activities of Daily Living No help needed   Instrumental Activities of Daily Living No help needed   Mini Cog Total Score 3   Some recent data might be hidden     A Mini-Cog score of 0-2 suggests the possibility of dementia, score of 3-5 suggests no dementia    Identified Health Risks:     Patient's advanced directive was discussed and I am comfortable with the patient's wishes.        "

## 2021-06-19 NOTE — LETTER
Letter by Radha Landa at      Author: Radha Landa Service: -- Author Type: --    Filed:  Encounter Date: 10/23/2019 Status: Signed         Frank Robles  [address]  [place] [address]      October 23, 2019      Dear Mr. Robles,    RE: Remote Results    We are writing to you regarding your recent Remote Pacemaker check from home. Your transmission was received successfully. Battery status is satisfactory at this time.     Your results are being reviewed.  You will be contacted if further information is necessary.     Your next device appointment will be a remote check on January 27, 2020.  You can choose the time of day you wish to transmit.    To schedule or reschedule, please call [phone] and press 1.    NOTE: If you would like to do an extra transmission, please call [phone] and press 3 to speak to a nurse BEFORE transmitting. This ensures that the Device Clinic staff is aware of the reason you are sending a transmission, and can follow-up with you after it has been reviewed.    We will be checking your implanted device from home (remotely) every three months unless otherwise instructed. We will need to see you in the clinic at least once a year. You may need to be seen in the clinic sooner depending on the results of your check.    Please be aware:    The follow-up schedule is like a Physician prescription.    Your remote monitor is paired to your specific implanted device.      Sincerely,    [facility]

## 2021-06-19 NOTE — LETTER
Letter by Sarah Smith RN at      Author: Sarah Smith RN Service: -- Author Type: --    Filed:  Encounter Date: 11/20/2019 Status: Signed         Frank OROZCO Travis  [address]  [place] [address]                          Dear Frank Robles,    Important Information for Your Procedure    You are having a Biventricular Pacemaker Implant Procedure:    Your procedure is scheduled for Monday 1/20/19    Please arrive at 9:00 am for registration and preporation for your procedure.   Getting Ready for Your Procedure:    You will need to contact your primary doctor Timbo Auguste MD ([phone]), and schedule a pre-operative history within 30 days of your procedure.    You will need to bring a current list of your medications with you for our admissions process the day of your procedure, please do not bring any of your own medications  with you to the hospital    The hospital cannot store your valuables, so please leave them at home    You will need to take off your jewelry prior to your procedure, we advise leaving your jewelry at home, as we cannot store your valuables    Please shower the morning of your procedure, or the night before    This procedure requires you to spend the night in the hospital overnight for observation. The hospital staff have asked that you arrange for your family/health  to be present at the hospital by 9:00 AM the following day to review your discharge instructions and transport you home from the hospital. Their goal is to have you discharged from the hospital by around 10:00AM.    Please make arrangements for transportation home, as you will not be able to drive following your procedure  The Night Prior to Your Procedure:    Please do not have anything to eat or drink after Midnight (12:00am) prior to your procedure    It is important to stay well hydrated, and consume balanced meals the day prior to your procedure  Arriving for Your  Procedure:    Please arrive at [facility], located at: [address] [place] [id number]      parking is available after 5:00am for your convenience, parking is also available in the parking ramp located off of [address] attached to the hospital    If you park in the ramp located on [address], take the elevator to level one. Enter the doors to the atrium/lobby area and check in at the main reception desk.     Please check in at the main reception desk in the lobby    You will be assisted to Cardiac Special Care on the third floor.  Going Home:    The physician and/or nurse will review any restrictions, follow-up requirements, and medication instructions prior to going home from the hospital in detail    Listed below are the restrictions that you can anticipating having after your procedure:  o For four weeks, with your device implant arm DO NOT:    Raise your arm above the height of your shoulder    Perform any vigorous arm movements    Swim, golf, wash windows, shovel snow or vacuum    Lift greater than 10-15 pounds  o You will not be able to shower for 3 days after your procedure, to reduce the risk for infection and disrupting your incision site.  o You will not be allowed to drive for 3 days after your procedure.  Clinic Contact Information:    You can contact our main clinic line at any time with questions, concerns, or if you need further information: [facility] [phone]    If you have further questions in regards to the scheduling of you procedure, please contact [facility] Schedulers at [phone]    Medication prior to your procedure:    Please take your morning medications the day of your procedure with sips of water, except any multivitamins or supplements.    Continue taking you anticoagulation medication Eliquis as prescribed, we are not going to stop your anticoagulation prior to your procedure and it is important for you to remain  on this medication.      Sincerely,  Sarah Smith RN  Please call with any questions or concerns regarding the procedure at : [phone]

## 2021-06-19 NOTE — LETTER
Letter by Karen Jean-Baptiste EPS at      Author: Karen Jean-Baptiste EPS Service: -- Author Type: --    Filed:  Encounter Date: 4/10/2019 Status: (Other)         Frank Robles  [address] [address]      April 10, 2019      Dear Mr. Robles,    RE: Remote Results    We are writing to you regarding your recent Remote Pacemaker check from home. Your transmission was received successfully. Battery status is satisfactory at this time.     Your results are being reviewed.    Your next device appointment will be a remote check on July 15th, 2019.  You can choose the time of day you wish to transmit.    To schedule or reschedule, please call [phone] and press 1.    NOTE: If you would like to do an extra transmission, please call [phone] and press 3 to speak to a nurse BEFORE transmitting. This ensures that the Device Clinic staff is aware of the reason you are sending a transmission, and can follow-up with you after it has been reviewed.    We will be checking your implanted device from home (remotely) every three months unless otherwise instructed. We will need to see you in the clinic at least once a year. You may need to be seen in the clinic sooner depending on the results of your check.    Please be aware:    The follow-up schedule is like a Physician prescription.    Your remote monitor is paired to your specific implanted device.      Sincerely,    [facility]

## 2021-06-19 NOTE — LETTER
Letter by Kat Culp RN at      Author: Kat Culp RN Service: -- Author Type: --    Filed:  Encounter Date: 10/10/2019 Status: Signed         Frank Robles  [address]  [place] [address]             October 10, 2019         Dear Mr. Robles,    Below are the results from your recent visit:    Resulted Orders   Basic Metabolic Panel   Result Value Ref Range    Sodium 135 (L) 136 - 145 mmol/L    Potassium 4.3 3.5 - 5.0 mmol/L    Chloride 95 (L) 98 - 107 mmol/L    CO2 33 (H) 22 - 31 mmol/L    Anion Gap, Calculation 7 5 - 18 mmol/L    Glucose 106 70 - 125 mg/dL    Calcium 8.9 8.5 - 10.5 mg/dL    BUN 22 8 - 28 mg/dL    Creatinine 0.88 0.70 - 1.30 mg/dL    GFR MDRD Af Amer >60 >60 mL/min/1.73m2    GFR MDRD Non Af Amer >60 >60 mL/min/1.73m2    Narrative    Fasting Glucose reference range is 70-99 mg/dL per  American Diabetes Association (ADA) guidelines.     Per Kelli NP:   Labs are stable.  Continue current medications.   Please call with questions or contact us using [facility].    Sincerely,        Electronically signed by Kat Culp RN

## 2021-06-19 NOTE — LETTER
Letter by Radha Landa at      Author: Radha Landa Service: -- Author Type: --    Filed:  Encounter Date: 7/15/2019 Status: (Other)         Frank Robles  [address]  [place] [address]      July 16, 2019      Dear Mr. Robles,    RE: Remote Results    We are writing to you regarding your recent Remote Pacemaker check from home. Your transmission was received successfully. Battery status is satisfactory at this time.     Your results are being reviewed.  You will be contacted if further information is necessary.     Your next device appointment will be a clinic visit.  Please call in September to schedule.      To schedule or reschedule, please call [phone] and press 1.    NOTE: If you would like to do an extra transmission, please call [phone] and press 3 to speak to a nurse BEFORE transmitting. This ensures that the Device Clinic staff is aware of the reason you are sending a transmission, and can follow-up with you after it has been reviewed.    We will be checking your implanted device from home (remotely) every three months unless otherwise instructed. We will need to see you in the clinic at least once a year. You may need to be seen in the clinic sooner depending on the results of your check.    Please be aware:    The follow-up schedule is like a Physician prescription.    Your remote monitor is paired to your specific implanted device.      Sincerely,    [facility]        Anesthesia Volume In Cc: 3

## 2021-06-19 NOTE — LETTER
Letter by Timbo Auguste MD at      Author: Timbo Auguste MD Service: -- Author Type: --    Filed:  Encounter Date: 6/19/2019 Status: (Other)         Frank Robles  [address]  [place] [address]             June 19, 2019         Dear Mr. Robles,    Below are the results from your recent visit:    Resulted Orders   Basic Metabolic Panel   Result Value Ref Range    Sodium 134 (L) 136 - 145 mmol/L    Potassium 4.1 3.5 - 5.0 mmol/L    Chloride 96 (L) 98 - 107 mmol/L    CO2 29 22 - 31 mmol/L    Anion Gap, Calculation 9 5 - 18 mmol/L    Glucose 76 70 - 125 mg/dL    Calcium 9.4 8.5 - 10.5 mg/dL    BUN 18 8 - 28 mg/dL    Creatinine 0.86 0.70 - 1.30 mg/dL    GFR MDRD Af Amer >60 >60 mL/min/1.73m2    GFR MDRD Non Af Amer >60 >60 mL/min/1.73m2    Narrative    Fasting Glucose reference range is 70-99 mg/dL per  American Diabetes Association (ADA) guidelines.   Hepatic Profile   Result Value Ref Range    Bilirubin, Total 0.8 0.0 - 1.0 mg/dL    Bilirubin, Direct 0.4 <=0.5 mg/dL    Protein, Total 7.4 6.0 - 8.0 g/dL    Albumin 3.2 (L) 3.5 - 5.0 g/dL    Alkaline Phosphatase 119 45 - 120 U/L    AST 36 0 - 40 U/L    ALT 23 0 - 45 U/L   Lipid Cascade   Result Value Ref Range    Cholesterol 143 <=199 mg/dL    Triglycerides 52 <=149 mg/dL    HDL Cholesterol 45 >=40 mg/dL    LDL Calculated 88 <=129 mg/dL    Patient Fasting > 8hrs? Yes    HM2(CBC w/o Differential)   Result Value Ref Range    WBC 5.5 4.0 - 11.0 thou/uL    RBC 4.02 (L) 4.40 - 6.20 mill/uL    Hemoglobin 12.6 (L) 14.0 - 18.0 g/dL    Hematocrit 37.4 (L) 40.0 - 54.0 %    MCV 93 80 - 100 fL    MCH 31.4 27.0 - 34.0 pg    MCHC 33.7 32.0 - 36.0 g/dL    RDW 11.9 11.0 - 14.5 %    Platelets 149 140 - 440 thou/uL    MPV 6.9 (L) 7.0 - 10.0 fL       Bernard,    Here is a copy of your test results which look generally good.  Your kidney profile shows essentially normal kidney function.  Your sodium level is borderline low and this will be followed.   Your hemoglobin is also a bit low but stable.  Your liver tests are generally good.      Your cholesterol tests are stable and you can review with cardiology.       Please call with questions or contact us using MyChart.    Sincerely,        Electronically signed by Timbo Auguste MD

## 2021-06-20 NOTE — LETTER
Letter by Danae Castro RDCS at      Author: Danae Castro RDCS Service: -- Author Type: --    Filed:  Encounter Date: 1/8/2020 Status: Signed         Frank Robles  [address]  [place] [address]      January 8, 2020      Dear Mr. Robles,    RE: Remote Results    We are writing to you regarding your recent Remote Pacemaker check from home. Your transmission was received successfully. Battery status is satisfactory at this time.     Your results are being reviewed.  You will be contacted if further information is necessary.     Your next device appointment will be a clinic visit on March 2, 2020 at 10:20AM at our  downtown [facility] location, [address] [place] [address].    To schedule or reschedule, please call [phone] and press 1.    NOTE: If you would like to do an extra transmission, please call [phone] and press 3 to speak to a nurse BEFORE transmitting. This ensures that the Device Clinic staff is aware of the reason you are sending a transmission, and can follow-up with you after it has been reviewed.    We will be checking your implanted device from home (remotely) every three months unless otherwise instructed. We will need to see you in the clinic at least once a year. You may need to be seen in the clinic sooner depending on the results of your check.    Please be aware:    The follow-up schedule is like a Physician prescription.    Your remote monitor is paired to your specific implanted device.      Sincerely,    [facility]

## 2021-06-20 NOTE — PROGRESS NOTES
In clinic device check.  Please see link for full device report.  Patient was informed of results and next follow up during today's visit.      Type: In clinic pacemaker check.   Presenting Rhythm: Atrial fibrillation with  63bpm  Lead/Battery status: Stable  Arrhythmias: Presumed chronic AF programmed. 1 high ventricular rate detection from 5/30/2018 9am showing 23sec of AF with RVR vs NSVT, rate starts out at 160bpm and speeds up to 190bpm then self terminates, with notable morphology change. See PDF for review of rhythm. Pt and wife think he may have been biking at the time, however asymptomatic. Last Echo EF done at [place] as of 6/2018 41%.   Anticoagulant: Eliquis.   Comments: Normal device function. No changes made. Will route to Dr. Hartman for his review. BK

## 2021-06-20 NOTE — LETTER
Letter by Kelli Mathias CNP at      Author: Kelli Mathias CNP Service: -- Author Type: --    Filed:  Encounter Date: 7/14/2020 Status: (Other)         Frank Robles  [address]  [place] [address]      July 14, 2020      Dear Mr. Robles,    RE: Remote Results    We are writing to you regarding your recent Remote Pacemaker check from home. Your transmission was received successfully. Battery status is satisfactory at this time.     Your results are being reviewed.  You will be contacted if further information is necessary.     Your next device appointment will be a remote check on 8/26/2020.  You can choose the time of day you wish to transmit.    To schedule or reschedule, please call [phone] and press 1.    NOTE: If you would like to do an extra transmission, please call [phone] and press 3 to speak to a nurse BEFORE transmitting. This ensures that the Device Clinic staff is aware of the reason you are sending a transmission, and can follow-up with you after it has been reviewed.    We will be checking your implanted device from home (remotely) every three months unless otherwise instructed. We will need to see you in the clinic at least once a year. You may need to be seen in the clinic sooner depending on the results of your check.    Please be aware:    The follow-up schedule is like a Physician prescription.    Your remote monitor is paired to your specific implanted device.      Sincerely,    [facility]

## 2021-06-20 NOTE — LETTER
Letter by Ty Greene RN at      Author: Ty Greene RN Service: -- Author Type: --    Filed:  Encounter Date: 8/26/2020 Status: (Other)         Frank Robles  [address]  [place] [address]      August 26, 2020      Dear Mr. Robles,    RE: Remote Results    We are writing to you regarding your recent Remote Pacemaker check from home. Your transmission was received successfully. Battery status is satisfactory at this time.     Your results are showing no significant changes.    Your next device appointment will be a remote check on 11/25/2020; this will occur automatically.    To schedule or reschedule, please call [phone] and press 1.    NOTE: If you would like to do an extra transmission, please call [phone] and press 3 to speak to a nurse BEFORE transmitting. This ensures that the Device Clinic staff is aware of the reason you are sending a transmission, and can follow-up with you after it has been reviewed.    We will be checking your implanted device from home (remotely) every three months unless otherwise instructed. We will need to see you in the clinic at least once a year. You may need to be seen in the clinic sooner depending on the results of your check.    Please be aware:    The follow-up schedule is like a Physician prescription.    Your remote monitor is paired to your specific implanted device.      Sincerely,    [facility]

## 2021-06-21 NOTE — LETTER
Letter by Lorena Delaney RN at      Author: Lorena Delaney RN Service: -- Author Type: --    Filed:  Encounter Date: 2/23/2021 Status: (Other)         Frank Robles  [address]  [place] [address]      February 24, 2021      Dear Mr. Robles,    RE: Remote Results    We are writing to you regarding your recent Remote Pacemaker check from home. Your transmission was received successfully. Battery status is satisfactory at this time.     Your results are within normal limits.    Your next device appointment will be a clinic visit.  Please call in April  to schedule.      To schedule or reschedule, please call [phone] and press 1.    NOTE: If you would like to do an extra transmission, please call [phone] and press 3 to speak to a nurse BEFORE transmitting. This ensures that the Device Clinic staff is aware of the reason you are sending a transmission, and can follow-up with you after it has been reviewed.    We will be checking your implanted device from home (remotely) every three months unless otherwise instructed. We will need to see you in the clinic at least once a year. You may need to be seen in the clinic sooner depending on the results of your check.    Please be aware:    The follow-up schedule is like a Physician prescription.    Your remote monitor is paired to your specific implanted device.      Sincerely,    [facility]

## 2021-06-21 NOTE — LETTER
Letter by Bhavin Tian MD at      Author: Bhavin Tian MD Service: -- Author Type: --    Filed:  Encounter Date: 12/1/2020 Status: (Other)         Frank Robles  [address]  [place] [address]    December 1, 2020    Dear Mr. Robles,    Welcome to [facility]! Your appointment information is below.   Please bring the following to your appointment:    Insurance Card, so we may scan it for our records    Drivers license or valid ID, so we may scan it for our records    Co-pay (as applicable per your insurance plan)    A current list of your medications including over the counter products such as vitamins and supplements    Your medical records including copies of X-Ray films if you are transferring your care from another clinic.  If you do not have your records, please fill out the release of information form and we will request those records.     Provider: Bhavin Tian MD  Appointment Date:  Wednesday, January 27, 2021  Arrival Time:   10:00 PFT,  11:00 dr appt.    Location: [facility]         [address]        [place], [address]    **Please allow adequate time for your commute and parking. If you are more than 10 minutes late, you may be asked to reschedule.     If you need to cancel or reschedule your appointment, please notify us at least 24 hours prior to your appointment time so we are able to make this time available for another patient.    Thank you for choosing the [facility] for your health care needs. If you have any questions, please do not hesitate to contact us at any time at   [phone]. We look forward to caring for you.     Sincerely,     [facility] staff

## 2021-06-21 NOTE — LETTER
Letter by Bindu Mccurdy RN at      Author: Bindu Mccurdy RN Service: -- Author Type: --    Filed:  Encounter Date: 11/25/2020 Status: (Other)         Frank Robles  [address]  [place] [address]      November 25, 2020      Dear Mr. Robles,    RE: Remote Results    We are writing to you regarding your recent Remote Pacemaker check from home. Your transmission was received successfully. Battery status is satisfactory at this time.     Your results are within normal limits.    Your next device appointment will be a remote check on 2/24/2021; this will occur automatically.    To schedule or reschedule, please call [phone] and press 1.    NOTE: If you would like to do an extra transmission, please call [phone] and press 3 to speak to a nurse BEFORE transmitting. This ensures that the Device Clinic staff is aware of the reason you are sending a transmission, and can follow-up with you after it has been reviewed.    We will be checking your implanted device from home (remotely) every three months unless otherwise instructed. We will need to see you in the clinic at least once a year. You may need to be seen in the clinic sooner depending on the results of your check.    Please be aware:    The follow-up schedule is like a Physician prescription.    Your remote monitor is paired to your specific implanted device.      Sincerely,    [facility]

## 2021-06-21 NOTE — LETTER
Letter by Bhavin Tian MD at      Author: Bhavin Tian MD Service: -- Author Type: --    Filed:  Encounter Date: 12/1/2020 Status: (Other)         Frank Robles  [address]  [place] [address]    December 1, 2020    Dear Mr. Robles,    Welcome to [facility]! Your appointment information is below.   Please bring the following to your appointment:    Insurance Card, so we may scan it for our records    Drivers license or valid ID, so we may scan it for our records    Co-pay (as applicable per your insurance plan)    A current list of your medications including over the counter products such as vitamins and supplements    Your medical records including copies of X-Ray films if you are transferring your care from another clinic.  If you do not have your records, please fill out the release of information form and we will request those records.     Provider: Bhavin Tian MD  Appointment Date:   Wednesday, January 27, 2021  Arrival Time:  10:00 PFT,  11:00 dr appt.    Location: [facility]         [address]        [place], [address]    **Please allow adequate time for your commute and parking. If you are more than 10 minutes late, you may be asked to reschedule.     If you need to cancel or reschedule your appointment, please notify us at least 24 hours prior to your appointment time so we are able to make this time available for another patient.    Thank you for choosing the [facility] for your health care needs. If you have any questions, please do not hesitate to contact us at any time at   [phone]. We look forward to caring for you.     Sincerely,     [facility] staff

## 2021-06-22 NOTE — TELEPHONE ENCOUNTER
Refill Approved    Rx renewed per Medication Renewal Policy. Medication was last renewed on 6/11/18.    Last office visit 6/11/18    Yon Shahid, [facility] Triage/Med Refill 1/3/2019     Requested Prescriptions   Pending Prescriptions Disp Refills     gabapentin (NEURONTIN) 300 MG capsule [Pharmacy Med Name: Gabapentin Oral Capsule 300 MG] 270 capsule 0     Sig: TAKE 1 CAPSULE BY MOUTH THREE TIMES DAILY. MAY TAKE 2-3 CAPSULES BY MOUTH DAILY, DOES NOT NEED TO BE 3 SEPERATE TIMES    Gabapentin/Levetiracetam/Tiagabine Refill Protocol  Passed - 1/2/2019 10:11 AM       Passed - PCP or prescribing provider visit in past 12 months or next 3 months    Last office visit with prescriber/PCP: 9/21/2017 Timbo Auguste MD OR same dept: Visit date not found OR same specialty: 10/26/2017 Dilshad Khan MD  Last physical: 6/11/2018 Last MTM visit: Visit date not found   Next visit within 3 mo: Visit date not found  Next physical within 3 mo: Visit date not found  Prescriber OR PCP: Timbo Auguste MD  Last diagnosis associated with med order: 1. Pain  - gabapentin (NEURONTIN) 300 MG capsule [Pharmacy Med Name: Gabapentin Oral Capsule 300 MG]; TAKE 1 CAPSULE BY MOUTH THREE TIMES DAILY. MAY TAKE 2-3 CAPSULES BY MOUTH DAILY, DOES NOT NEED TO BE 3 SEPERATE TIMES  Dispense: 270 capsule; Refill: 0    If protocol passes may refill for 12 months if within 3 months of last provider visit (or a total of 15 months).

## 2021-06-26 NOTE — PROGRESS NOTES
Progress Notes by David Hartman MD at 10/18/2018  4:00 PM     Author: David Hartman MD Service: -- Author Type: Physician    Filed: 10/18/2018  4:47 PM Encounter Date: 10/18/2018 Status: Signed    : David Hartman MD (Physician)           Click to link to [facility] HEART CARE NOTE    Thank you, Dr. Auguste, for asking us to see Frank Robles at the [facility].      Assessment/Recommendations   Patient with known coronary artery disease and reduced left ventricular systolic function as well as atrial dysrhythmias who is appropriately anticoagulated.  I have not changing his medications today but encouraged him to maintain an active lifestyle.  If he has changes in his functional capacity I would like to see him.    We will see him back in 6 months but of course would be happy to see him sooner if questions or problems arise.    Thank you for allowing us to participate in his care.         History of Present Illness    Mr. Frank Robles is a 84 y.o. male with known coronary artery disease and reduced left ventricular systolic function.  He has been feeling well since my visit with him earlier in the summer.  He denies shortness of breath which is out out of the ordinary and no chest discomfort.  He did use his battery assisted 3 we will bike this summer without difficulties.  He denies orthopnea, paroxysmal nocturnal dyspnea, peripheral edema, syncope or near syncopal episodes.  His device check did show one high rate in May and he thinks that he was just learning how to ride his new 3-wheel bike at that time.  He had a pushed up a hill.         Physical Examination Review of Systems   Vitals:    10/18/18 1604   BP: 104/60   Pulse: 84   Resp: 20     Body mass index is 25.25 kg/(m^2).  Wt Readings from Last 3 Encounters:   10/18/18 171 lb (77.6 kg)   10/04/18 169 lb (76.7 kg)   06/25/18 165 lb (74.8 kg)     General Appearance:   Alert, cooperative and  in no acute distress.   ENT/Mouth: Oral mucuos membranes pink and moist .      EYES:  No scleral icterus. No Xanthelasma.    Neck: JVP normal. No Hepatojugular reflux. Thyroid not visualized   Chest/Lungs:   Lungs are clear to auscultation, equal chest wall expansion    Cardiovascular:   S1, S2 without murmur ,clicks or rubs. Brachial, radial  pulses are intact and symetric. No carotid bruits noted   Abdomen:  Nontender. BS+. No bruits.      Extremities: No cyanosis, clubbing or edema   Skin: no xanthelasma, warm.    Psych: Appropriate affect.   Neurologic: normal gait, normal  bilateral, no tremors        General: WNL  Eyes: WNL  Ears/Nose/Throat: WNL  Lungs: WNL  Heart: WNL  Stomach: WNL  Bladder: WNL  Muscle/Joints: WNL  Skin: WNL  Nervous System: WNL  Mental Health: WNL     Blood: WNL     Medical History  Surgical History Family History Social History   Past Medical History:   Diagnosis Date   ? Actinic keratosis    ? Acute posthemorrhagic anemia    ? Anemia    ? Anxiety disorder due to general medical condition    ? Atrial flutter (H)    ? CAD (coronary artery disease)    ? Cardiomyopathy (H)    ? CHF (congestive heart failure) (H)    ? Chronic reflux esophagitis    ? Constipation    ? Decubitus ulcer    ? Fatigue    ? Fatigue    ? Foot pain    ? Hemorrhoids    ? HTN (hypertension)    ? Insomnia    ? Lipoma     adipose tissue   ? Major depression, chronic    ? 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 Pleural effusion    ? Polyuria    ? Slurred speech     few months after open heart   ? SSS (sick sinus syndrome) (H)    ? Transient global amnesia    ? Vasovagal syncope     Past Surgical History:   Procedure Laterality Date   ? CARDIAC PACEMAKER PLACEMENT     ? [place] ABLATE HEART DYSRHYTHM FOCUS      Description: Catheter Ablation Atrial Fibrillation;  Recorded: 02/05/2014;  Comments: PVI Aug 2009: (PVI-RF + roof line)   ? [place] CABG, VEIN, SINGLE      Description: CABG (CABG);  Proc Date: 02/07/2013;  Comments:   Jatinder Simpson at [facility]; ; Feb 2013: CAB X 3 (LIMA to LAD, SVG to OM, SVG to PDA); ; Reopened same day for RA bleeding   ? [place] REMOVAL OF TONSILS,<13 Y/O      Description: Tonsillectomy;  Recorded: 03/26/2013;   ? TOTAL HIP ARTHROPLASTY      bilateral    Family History   Problem Relation Age of Onset   ? Heart attack Father 76    Social History     Social History   ? Marital status:      Spouse name: N/A   ? Number of children: N/A   ? Years of education: N/A     Occupational History   ? Not on file.     Social History Main Topics   ? Smoking status: Never Smoker   ? Smokeless tobacco: Never Used   ? Alcohol use 0.6 oz/week     1 Cans of beer per week   ? Drug use: No   ? Sexual activity: Not on file     Other Topics Concern   ? Not on file     Social History Narrative          Medications  Allergies   Current Outpatient Prescriptions   Medication Sig Dispense Refill   ? ELIQUIS 5 mg Tab tablet TAKE 1 TABLET BY MOUTH TWICE DAILY 180 tablet 1   ? furosemide (LASIX) 20 MG tablet Take 20 mg by mouth 2 (two) times a day. Patient currently takes one daily     ? gabapentin (NEURONTIN) 300 MG capsule Take 1 capsule (300 mg total) by mouth 3 (three) times a day. May take 2-3 capsules by mouth daily, sotelo snot need to be 3 separate times 270 capsule 3   ? lisinopril (PRINIVIL,ZESTRIL) 20 MG tablet Take one PO Qday 90 tablet 3   ? MELATONIN ORAL Take by mouth.     ? metoprolol succinate (TOPROL-XL) 25 MG Take 2 tablets (50 mg total) by mouth daily. 90 tablet 3   ? mirtazapine (REMERON) 7.5 MG tablet TAKE 1 TO 3 TABLETS BY MOUTH AT BEDTIME 270 tablet 1   ? valACYclovir (VALTREX) 500 MG tablet 500 mg daily.        No current facility-administered medications for this visit.       Allergies   Allergen Reactions   ? Zolpidem Other (See Comments)     Hallucinations needed to be restrained in hospital   ? Diphenhydramine Hcl Other (See Comments)     tachycardia         Lab Results    Chemistry/lipid CBC Cardiac  Enzymes/BNP/TSH/INR   Lab Results   Component Value Date    CHOL 98 02/27/2013    HDL 39 (L) 02/27/2013    LDLCALC 47 02/27/2013    TRIG 62 02/27/2013    CREATININE 0.84 07/30/2018    BUN 17 07/30/2018    K 4.4 07/30/2018     (L) 07/30/2018    CL 95 (L) 07/30/2018    CO2 32 (H) 07/30/2018    Lab Results   Component Value Date    WBC 6.4 02/09/2016    HGB 13.2 (L) 02/09/2016    HCT 39.0 (L) 02/09/2016    MCV 91 02/09/2016     02/09/2016    Lab Results   Component Value Date    CKTOTAL 158 02/17/2013    CKMB 5 05/27/2013    TROPONINI 0.02 05/27/2013     (H) 06/25/2018    TSH 2.07 09/21/2017    INR 1.10 10/26/2017

## 2021-06-26 NOTE — PROGRESS NOTES
Progress Notes by David Hartman MD at 6/25/2018  8:10 AM     Author: David Hartman MD Service: -- Author Type: Physician    Filed: 6/25/2018  9:04 AM Encounter Date: 6/25/2018 Status: Signed    : David Hartman MD (Physician)           Click to link to [facility] HEART CARE NOTE    Thank you, Dr. Auguste, for asking us to see Frank Robles at the [facility].      Assessment/Recommendations   Patient with known coronary artery disease with reduction in left ventricular systolic function with recent ejection fraction of 41% and some mild pulmonary hypertension.  He also has some mitral insufficiency although this is not severe.  His diuretic was increased and he does not necessarily feel better but his peripheral edema is now absent.  He is on a low-dose of a beta-blocker.  He has permanent atrial flutter and is appropriately anticoagulated for a high chads 2 vascular score.  He follows in our device clinic.    I have not recommended any changes in his medication but I would like to check a basic metabolic panel and a B natruretic peptide today because of the increase diuretic dose recently.  We will call him with the results.    I have encouraged him to maintain an active lifestyle and to call if he has any changes in his functional capacity.    We will plan on seeing him back in 6 months, but of course would be happy to see him sooner.    I suspect his beta-blocker was not increased because of relative hypotension.  This is something we could consider in the past, particularly if his left ventricular ejection fraction decreases further.    Thank you for the opportunity be involved in his care.         History of Present Illness    Mr. Frank Robles is a 83 y.o. male with known history of coronary artery disease and reduced left ventricular systolic function.  He has a history of coronary artery bypass surgery and complications postoperatively.  He  has been quite stable and has been followed by a cardiologist at the [facility] but the travel to the [facility] to become more burdensome and they would like to reestablish care with a general cardiologist here in [place].  He did see the cardiologist in [place] earlier this month at which time his Lasix was increased from 20 mg a day to 40 mg a day.  He had some peripheral edema at that time which seems to be gone now.  He reports that his breathing is about the same as it was and neither here nor his spouse thinks it is improved much.  He has not had any blood work since then.    He continues to what ride a 3 wheeled upright bike on a regular basis in the past was a long distance bicyclist and marathon runner.  He denies orthopnea, paroxysmal nocturnal dyspnea, peripheral edema, syncope or near syncopal episodes and denies any palpitations or chest pain or tightness.  He did not have chest pain prior to his bypass surgery.    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  His spouse is with him at the time of this evaluation.    Recent LDL cholesterol in [place] was below 90.         Physical Examination Review of Systems   Vitals:    06/25/18 0821   BP: 102/56   Pulse: 72   Resp: 16     Body mass index is 25.09 kg/(m^2).  Wt Readings from Last 3 Encounters:   06/25/18 165 lb (74.8 kg)   06/11/18 167 lb (75.8 kg)   05/01/18 169 lb (76.7 kg)     General Appearance:   Alert, cooperative and in no acute distress.   ENT/Mouth: Oral mucuos membranes pink and moist .      EYES:  No scleral icterus. No Xanthelasma.    Neck: JVP normal. No Hepatojugular reflux. Thyroid not visualized   Chest/Lungs:   Lungs are clear to auscultation but slight diminished breath sounds at the left base, equal chest wall expansion    Cardiovascular:   S1, S2 without murmur ,clicks or rubs. Brachial, radial pulses are intact and symetric. No carotid bruits noted   Abdomen:  Nontender.  BS+.      Extremities: No cyanosis, clubbing or edema   Skin: no xanthelasma, warm.    Psych: Appropriate affect.   Neurologic:  Slow and somewhat deliberate but normal gait, normal  bilateral, no tremors        General: WNL  Eyes: WNL  Ears/Nose/Throat: WNL  Lungs: WNL  Heart: WNL  Stomach: WNL  Bladder: WNL  Muscle/Joints: WNL  Skin: WNL  Nervous System: WNL  Mental Health: WNL     Blood: WNL     Medical History  Surgical History Family History Social History   Past Medical History:   Diagnosis Date   ? Actinic keratosis    ? Acute posthemorrhagic anemia    ? Anemia    ? Anxiety disorder due to general medical condition    ? Atrial flutter (H)    ? CAD (coronary artery disease)    ? Cardiomyopathy (H)    ? CHF (congestive heart failure) (H)    ? Chronic reflux esophagitis    ? Constipation    ? Decubitus ulcer    ? Fatigue    ? Fatigue    ? Foot pain    ? Hemorrhoids    ? HTN (hypertension)    ? Insomnia    ? Lipoma     adipose tissue   ? Major depression, chronic    ? 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 Pleural effusion    ? Polyuria    ? Slurred speech     few months after open heart   ? SSS (sick sinus syndrome) (H)    ? Transient global amnesia    ? Vasovagal syncope     Past Surgical History:   Procedure Laterality Date   ? CARDIAC PACEMAKER PLACEMENT     ? [place] ABLATE HEART DYSRHYTHM FOCUS      Description: Catheter Ablation Atrial Fibrillation;  Recorded: 02/05/2014;  Comments: PVI Aug 2009: (PVI-RF + roof line)   ? [place] CABG, VEIN, SINGLE      Description: CABG (CABG);  Proc Date: 02/07/2013;  Comments: Dr. Jatinder Simpson at [facility] ; Feb 2013: CAB X 3 (LIMA to LAD, SVG to OM, SVG to PDA); ; Reopened same day for RA bleeding   ? [place] REMOVAL OF TONSILS,<11 Y/O      Description: Tonsillectomy;  Recorded: 03/26/2013;   ? TOTAL HIP ARTHROPLASTY      bilateral    Family History   Problem Relation Age of Onset   ? Heart attack Father 76    Social History     Social History   ? Marital status:       Spouse name: N/A   ? Number of children: N/A   ? Years of education: N/A     Occupational History   ? Not on file.     Social History Main Topics   ? Smoking status: Never Smoker   ? Smokeless tobacco: Never Used   ? Alcohol use 0.6 oz/week     1 Cans of beer per week   ? Drug use: No   ? Sexual activity: Not on file     Other Topics Concern   ? Not on file     Social History Narrative          Medications  Allergies   Current Outpatient Prescriptions   Medication Sig Dispense Refill   ? ELIQUIS 5 mg Tab tablet TAKE 1 TABLET BY MOUTH TWICE DAILY. 180 tablet 1   ? furosemide (LASIX) 20 MG tablet Take 20 mg by mouth 2 (two) times a day. Patient currently takes one daily     ? gabapentin (NEURONTIN) 300 MG capsule Take 1 capsule (300 mg total) by mouth 3 (three) times a day. May take 2-3 capsules by mouth daily, sotelo snot need to be 3 separate times 270 capsule 3   ? lisinopril (PRINIVIL,ZESTRIL) 20 MG tablet Take one PO Qday 90 tablet 3   ? MELATONIN ORAL Take by mouth.     ? metoprolol succinate (TOPROL-XL) 25 MG TAKE 2 TABLETS BY MOUTH DAILY 100 tablet 1   ? mirtazapine (REMERON) 7.5 MG tablet TAKE 1 TO 3 TABLETS BY MOUTH AT BEDTIME 270 tablet 1   ? valACYclovir (VALTREX) 500 MG tablet 500 mg daily.        No current facility-administered medications for this visit.       Allergies   Allergen Reactions   ? Zolpidem Other (See Comments)     Hallucinations needed to be restrained in hospital   ? Diphenhydramine Hcl Other (See Comments)     tachycardia         Lab Results    Chemistry/lipid CBC Cardiac Enzymes/BNP/TSH/INR   Lab Results   Component Value Date    CHOL 98 02/27/2013    HDL 39 (L) 02/27/2013    LDLCALC 47 02/27/2013    TRIG 62 02/27/2013    CREATININE 1.09 09/21/2017    BUN 19 09/21/2017    K 5.1 (H) 09/21/2017     (L) 09/21/2017    CL 95 (L) 09/21/2017    CO2 21 (L) 09/21/2017    Lab Results   Component Value Date    WBC 6.4 02/09/2016    HGB 13.2 (L) 02/09/2016    HCT 39.0 (L) 02/09/2016    MCV 91  02/09/2016     02/09/2016    Lab Results   Component Value Date    CKTOTAL 158 02/17/2013    CKMB 5 05/27/2013    TROPONINI 0.02 05/27/2013     (H) 02/26/2013    TSH 2.07 09/21/2017    INR 1.10 10/26/2017

## 2021-06-27 NOTE — PROGRESS NOTES
Progress Notes by David Hartman MD at 5/7/2019  4:10 PM     Author: David Hartman MD Service: -- Author Type: Physician    Filed: 5/7/2019  4:50 PM Encounter Date: 5/7/2019 Status: Signed    : David Hartman MD (Physician)           Click to link to [facility]     [facility] HEART CARE NOTE    Thank you, Dr. Auguste, for asking us to see Frank Robles at the [facility].      Assessment/Recommendations   Patient with known coronary artery disease and reduction left ventricular systolic function who is doing well from a functional capacity standpoint.  He is walking in the hallways at the residence on a regular basis and will plan on getting outside and using his 3 wheeled bike in the near future.  He is not having any pulmonary vascular congestion.    He is on excellent medical regimen.    We will not change any of his medications today.  He will follow-up with routine laboratory work with Dr. Medina and I will plan on seeing him back in 6 months.  Of course, we would be happy to see him sooner if questions or problems arise.    Thank you for allowing us to participate in his care.         History of Present Illness    Mr. Frank Robles is a 84 y.o. male with known coronary artery disease, reduced left ventricular systolic function who has a history of heart failure.  He has been feeling well the past 6 months.  He has not had any chest discomfort, palpitations, syncope or near syncopal episodes and denies orthopnea, paroxysmal nocturnal dyspnea or peripheral edema.  He is not had any reported rhythm disturbances on his device and is in permanent atrial fibrillation.         Physical Examination Review of Systems   Vitals:    05/07/19 1623   BP: 92/52   Pulse: 64   Resp: 16     Body mass index is 25.47 kg/m .  Wt Readings from Last 3 Encounters:   05/07/19 170 lb (77.1 kg)   10/18/18 171 lb (77.6 kg)   10/04/18 169 lb (76.7 kg)     General Appearance:   Alert,  cooperative and in no acute distress.   ENT/Mouth: Oral mucuos membranes pink and moist .      EYES:  No scleral icterus. No Xanthelasma.    Neck: JVP normal. No Hepatojugular reflux. Thyroid not visualized   Chest/Lungs:   Lungs are clear to auscultation, equal chest wall expansion    Cardiovascular:   S1, S2 without murmur ,clicks or rubs. Brachial, radial  pulses are intact and symetric. No carotid bruits noted   Abdomen:  Nontender. BS+.       Extremities: No cyanosis, clubbing and mild pretibial edema laterally   Skin: no xanthelasma, warm.    Psych: Appropriate affect.   Neurologic:  Deliberate gait, normal  bilateral, no tremors                                                  Medical History  Surgical History Family History Social History   Past Medical History:   Diagnosis Date   ? Actinic keratosis    ? Acute posthemorrhagic anemia    ? Anemia    ? Anxiety disorder due to general medical condition    ? Atrial flutter (H)    ? CAD (coronary artery disease)    ? Cardiomyopathy (H)    ? CHF (congestive heart failure) (H)    ? Chronic reflux esophagitis    ? Constipation    ? Decubitus ulcer    ? Fatigue    ? Fatigue    ? Foot pain    ? Hemorrhoids    ? HTN (hypertension)    ? Insomnia    ? Lipoma     adipose tissue   ? Major depression, chronic    ? 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 Pleural effusion    ? Polyuria    ? Slurred speech     few months after open heart   ? SSS (sick sinus syndrome) (H)    ? Transient global amnesia    ? Vasovagal syncope     Past Surgical History:   Procedure Laterality Date   ? CARDIAC PACEMAKER PLACEMENT     ? [place] ABLATE HEART DYSRHYTHM FOCUS      Description: Catheter Ablation Atrial Fibrillation;  Recorded: 02/05/2014;  Comments: PVI Aug 2009: (PVI-RF + roof line)   ? [place] CABG, VEIN, SINGLE      Description: CABG (CABG);  Proc Date: 02/07/2013;  Comments: Dr. Jatinder Simpson at [facility] ; Feb 2013: CAB X 3 (LIMA to LAD, SVG to OM, SVG to PDA); ; Reopened same day for  RA bleeding   ? [place] REMOVAL OF TONSILS,<13 Y/O      Description: Tonsillectomy;  Recorded: 03/26/2013;   ? TOTAL HIP ARTHROPLASTY      bilateral    Family History   Problem Relation Age of Onset   ? Heart attack Father 76    Social History     Socioeconomic History   ? Marital status:      Spouse name: Not on file   ? Number of children: Not on file   ? Years of education: Not on file   ? Highest education level: Not on file   Occupational History   ? Not on file   Social Needs   ? Financial resource strain: Not on file   ? Food insecurity:     Worry: Not on file     Inability: Not on file   ? Transportation needs:     Medical: Not on file     Non-medical: Not on file   Tobacco Use   ? Smoking status: Never Smoker   ? Smokeless tobacco: Never Used   Substance and Sexual Activity   ? Alcohol use: Yes     Alcohol/week: 0.6 oz     Types: 1 Cans of beer per week   ? Drug use: No   ? Sexual activity: Not on file   Lifestyle   ? Physical activity:     Days per week: Not on file     Minutes per session: Not on file   ? Stress: Not on file   Relationships   ? Social connections:     Talks on phone: Not on file     Gets together: Not on file     Attends [employer] service: Not on file     Active member of club or organization: Not on file     Attends meetings of clubs or organizations: Not on file     Relationship status: Not on file   ? Intimate partner violence:     Fear of current or ex partner: Not on file     Emotionally abused: Not on file     Physically abused: Not on file     Forced sexual activity: Not on file   Other Topics Concern   ? Not on file   Social History Narrative   ? Not on file          Medications  Allergies   Current Outpatient Medications   Medication Sig Dispense Refill   ? ELIQUIS 5 mg Tab tablet TAKE 1 TABLET BY MOUTH TWICE DAILY 180 tablet 1   ? furosemide (LASIX) 20 MG tablet Take 1 tablet (20 mg total) by mouth 2 (two) times a day at 9am and 6pm. Patient currently takes one daily 180  tablet 3   ? gabapentin (NEURONTIN) 300 MG capsule TAKE 1 CAPSULE BY MOUTH 3 TIMES DAILY. MAY TAKE 2-3 CAPSULES BY MOUTH DAILY, DOES NOT NEED TO BE 3 SEPERATE TIMES 270 capsule 0   ? lisinopril (PRINIVIL,ZESTRIL) 20 MG tablet Take one PO Qday 90 tablet 3   ? MELATONIN ORAL Take by mouth.     ? metoprolol succinate (TOPROL-XL) 25 MG Take 2 tablets (50 mg total) by mouth daily. 90 tablet 3   ? mirtazapine (REMERON) 7.5 MG tablet TAKE 1 TO 3 TABLETS BY MOUTH AT BEDTIME 270 tablet 1   ? valACYclovir (VALTREX) 500 MG tablet 500 mg daily.        No current facility-administered medications for this visit.       Allergies   Allergen Reactions   ? Zolpidem Other (See Comments)     Hallucinations needed to be restrained in hospital   ? Beta-Blockers (Beta-Adrenergic Blocking Agts) Other (See Comments)     Fatigue if metoprolol > 12.5 mg   ? Diphenhydramine Hcl Other (See Comments)     tachycardia         Lab Results    Chemistry/lipid CBC Cardiac Enzymes/BNP/TSH/INR   Lab Results   Component Value Date    CHOL 98 02/27/2013    HDL 39 (L) 02/27/2013    LDLCALC 47 02/27/2013    TRIG 62 02/27/2013    CREATININE 0.84 07/30/2018    BUN 17 07/30/2018    K 4.4 07/30/2018     (L) 07/30/2018    CL 95 (L) 07/30/2018    CO2 32 (H) 07/30/2018    Lab Results   Component Value Date    WBC 6.4 02/09/2016    HGB 13.2 (L) 02/09/2016    HCT 39.0 (L) 02/09/2016    MCV 91 02/09/2016     02/09/2016    Lab Results   Component Value Date    CKTOTAL 158 02/17/2013    CKMB 5 05/27/2013    TROPONINI 0.02 05/27/2013     (H) 06/25/2018    TSH 2.07 09/21/2017    INR 1.10 10/26/2017

## 2021-06-28 NOTE — PROGRESS NOTES
Progress Notes by David Hartman MD at 11/21/2019  1:40 PM     Author: David Hartman MD Service: -- Author Type: Physician    Filed: 11/21/2019  2:04 PM Encounter Date: 11/21/2019 Status: Signed    : David Hartman MD (Physician)               Assessment/Recommendations   Patient with known coronary artery disease, reduced left ventricular systolic function, atrial dysrhythmias and pacemaker with high amount of RV pacing.  He is scheduled for an upgrade with his pacemaker in early December.  He is scheduled to have consideration for a left ventricular lead because of the high level of pacing and reduced left ventricular systolic function.  His echocardiogram recently did show some improvement in his left ventricular ejection fraction although was judged to be similar to the prior echo.    His fluid status seems quite stable at this time without evidence of pulmonary vascular congestion.  His functional capacity is also stable.  I have not change any of his diuretics or medications today.    He will continue to follow in the heart failure clinic and I will see him back in 6 months but of course would be happy to see him sooner if questions or problems arise.  Thank you for allowing us to participate in his care.       History of Present Illness/Subjective    Mr. Frank Robles is a 85 y.o. male with known atrial flutter, fibrillation, coronary artery disease, ischemic cardiomyopathy, pacemaker implantation and set for an upgrade in his pacemaker in early December.  He has been feeling reasonably well.  He did get a little short of breath when he is out in the very cold weather the other day but this was unusual.  He denies orthopnea, paroxysmal nocturnal dyspnea, peripheral edema, syncope, near syncope or palpitations.  He has not had any chest discomfort.  Overall he feels like he is quite stable.           Physical Examination Review of Systems   Vitals:    11/21/19 1330   BP: 120/70   Pulse: 72    Resp: 16     Body mass index is 23.75 kg/m .  Wt Readings from Last 3 Encounters:   11/21/19 162 lb (73.5 kg)   10/09/19 161 lb (73 kg)   09/19/19 171 lb (77.6 kg)     General Appearance:   Alert, cooperative and in no acute distress.   ENT/Mouth: Oral mucuos membranes pink and moist .      EYES:  no scleral icterus, normal conjunctivae   Neck: JVP normal.  Positive hepatojugular reflux. Thyroid not visualized.   Chest/Lungs:   Lungs have dry crackles at the base, equal chest wall expansion.   Cardiovascular:   S1, S2 with a over 6 systolic murmur , no clicks or rubs. Brachial, radial and posterior tibial pulses are intact and symetric. No carotid bruits noted   Abdomen:  Nontender. BS+. .   Extremities: No cyanosis, clubbing and trace pretibial edema   Skin: no xanthelasma, warm.    Neurologic: normal  bilateral, no tremors.  Deliberate gait     Psychiatric: Appropriate affect.      General: WNL  Eyes: WNL  Ears/Nose/Throat: WNL  Lungs: WNL  Heart: Shortness of Breath with activity  Stomach: Constipation  Bladder: WNL  Muscle/Joints: WNL  Skin: WNL  Nervous System: WNL  Mental Health: WNL     Blood: WNL       Medical History  Surgical History Family History Social History   Past Medical History:   Diagnosis Date   ? Actinic keratosis    ? Acute posthemorrhagic anemia    ? Anemia    ? Anxiety disorder due to general medical condition    ? Atrial flutter (H)    ? CAD (coronary artery disease)    ? Cardiomyopathy (H)    ? CHF (congestive heart failure) (H)    ? Chronic reflux esophagitis    ? Chronic systolic heart failure (H)     Created by [facility] Annotation: Mar 18 2013  1:29PM - Dave Segundo: diastolic  Replacement Utility updated for latest IMO load   ? Constipation    ? Decubitus ulcer    ? Fatigue    ? Fatigue    ? Foot pain    ? Hemorrhoids    ? HTN (hypertension)    ? Insomnia    ? Lipoma     adipose tissue   ? Major depression, chronic    ? 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  Pleural effusion    ? Polyuria    ? Slurred speech     few months after open heart   ? SSS (sick sinus syndrome) (H)    ? Transient global amnesia    ? Vasovagal syncope     Past Surgical History:   Procedure Laterality Date   ? CARDIAC PACEMAKER PLACEMENT     ? IR EXTREMITY VENOGRAM LEFT  9/23/2019   ? [place] ABLATE HEART DYSRHYTHM FOCUS      Description: Catheter Ablation Atrial Fibrillation;  Recorded: 02/05/2014;  Comments: PVI Aug 2009: (PVI-RF + roof line)   ? CO CABG, VEIN, SINGLE      Description: CABG (CABG);  Proc Date: 02/07/2013;  Comments: Dr. Jatinder Simpson at [facility] ; Feb 2013: CAB X 3 (LIMA to LAD, SVG to OM, SVG to PDA); ; Reopened same day for RA bleeding   ? [place] REMOVAL OF TONSILS,<11 Y/O      Description: Tonsillectomy;  Recorded: 03/26/2013;   ? TOTAL HIP ARTHROPLASTY      bilateral    Family History   Problem Relation Age of Onset   ? Heart attack Father 76    Social History     Socioeconomic History   ? Marital status:      Spouse name: Not on file   ? Number of children: Not on file   ? Years of education: Not on file   ? Highest education level: Not on file   Occupational History   ? Not on file   Social Needs   ? Financial resource strain: Not on file   ? Food insecurity:     Worry: Not on file     Inability: Not on file   ? Transportation needs:     Medical: Not on file     Non-medical: Not on file   Tobacco Use   ? Smoking status: Never Smoker   ? Smokeless tobacco: Never Used   Substance and Sexual Activity   ? Alcohol use: Yes     Alcohol/week: 1.0 standard drinks     Types: 1 Cans of beer per week   ? Drug use: No   ? Sexual activity: Not on file   Lifestyle   ? Physical activity:     Days per week: Not on file     Minutes per session: Not on file   ? Stress: Not on file   Relationships   ? Social connections:     Talks on phone: Not on file     Gets together: Not on file     Attends [employer] service: Not on file     Active member of club or organization: Not on file     Attends  meetings of clubs or organizations: Not on file     Relationship status: Not on file   ? Intimate partner violence:     Fear of current or ex partner: Not on file     Emotionally abused: Not on file     Physically abused: Not on file     Forced sexual activity: Not on file   Other Topics Concern   ? Not on file   Social History Narrative   ? Not on file          Medications  Allergies   Current Outpatient Medications   Medication Sig Dispense Refill   ? ELIQUIS 5 mg Tab tablet TAKE 1 TABLET BY MOUTH TWICE DAILY 180 tablet 1   ? furosemide (LASIX) 20 MG tablet Take 2 tablets (40 mg total) by mouth 2 (two) times a day at 9am and 6pm. 360 tablet 3   ? gabapentin (NEURONTIN) 300 MG capsule Taking 1 am and 3 pm 270 capsule 3   ? MELATONIN ORAL Take by mouth.     ? metoprolol succinate (TOPROL-XL) 25 MG TAKE 2 TABLETS BY MOUTH DAILY 180 tablet 2   ? mirtazapine (REMERON) 7.5 MG tablet TAKE 1 TO 3 TABLETS BY MOUTH AT BEDTIME. 270 tablet 3   ? valACYclovir (VALTREX) 500 MG tablet 500 mg daily.        No current facility-administered medications for this visit.     Allergies   Allergen Reactions   ? Zolpidem Other (See Comments)     Hallucinations needed to be restrained in hospital   ? Beta-Blockers (Beta-Adrenergic Blocking Agts) Other (See Comments)     Fatigue if metoprolol > 12.5 mg   ? Diphenhydramine Hcl Other (See Comments)     tachycardia         Lab Results    Chemistry/lipid CBC Cardiac Enzymes/BNP/TSH/INR   Lab Results   Component Value Date    CHOL 143 06/14/2019    HDL 45 06/14/2019    LDLCALC 88 06/14/2019    TRIG 52 06/14/2019    CREATININE 0.88 10/09/2019    BUN 22 10/09/2019    K 4.3 10/09/2019     (L) 10/09/2019    CL 95 (L) 10/09/2019    CO2 33 (H) 10/09/2019    Lab Results   Component Value Date    WBC 5.5 06/14/2019    HGB 12.6 (L) 06/14/2019    HCT 37.4 (L) 06/14/2019    MCV 93 06/14/2019     06/14/2019    Lab Results   Component Value Date    CKTOTAL 158 02/17/2013    CKMB 5 05/27/2013     TROPONINI 0.02 05/27/2013    BNP 1,075 (H) 09/11/2019    TSH 2.07 09/21/2017    INR 1.10 10/26/2017

## 2021-06-28 NOTE — PROGRESS NOTES
Progress Notes by Kelli Mendoza CNP at 9/19/2019  9:50 AM     Author: Kelli Mendoza CNP Service: -- Author Type: Nurse Practitioner    Filed: 9/19/2019 10:14 AM Encounter Date: 9/19/2019 Status: Signed    : Kelli Mendoza CNP (Nurse Practitioner)           Click to link to [facility] HEART CARE NOTE      Assessment/Recommendations   Assessment:    1.  Heart failure with preserved ejection fraction, EF 51%: His symptoms have improved since increasing Lasix last week.  Blood pressure is low today but he is asymptomatic. We reviewed heart failure diagnosis, medications, treatment plan, low sodium diet, weight monitoring, and symptom monitoring.  He met with a heart failure nurse clinician to further discuss.    2.  Persistent atrial flutter: He continues to take Eliquis.    3.  Permanent pacemaker: Dr. Segundo adjusted settings last week to help with heart failure symptoms.  He was pacing in the RV at 97%.  Dr. Segundo would like venogram and possibly upgrade to CRT.  We discussed this today and patient is in agreement with plan.    Plan:  1.  BMP pending.  May need to lower Lasix dose  2.  Continue low-sodium diet and daily weights  3.  Venogram.  He met with EP RN to discuss       History of Present Illness    Mr. Frank Robles is a 85 y.o. male seen at [facility] heart failure clinic today for continued follow-up.  He has a history of hypertension, heart failure, and persistent atrial flutter.  Echocardiogram on September 13, 2019 showed ejection fraction of 51% and mild mitral regurgitation.    He was seen by Dr. Segundo on September 11 with acute on chronic heart failure symptoms.  He is pacing 97% in the RV.  His device settings were adjusted.  Dr. Segundo would like venogram and then upgrade to CRT if possible.  His Lasix was increased.    Bernard denies any shortness of breath with walking into clinic today.  He does note shortness of breath with  more strenuous activity.  His lower extremity edema has improved.  His weight has decreased about 5 pounds since increasing Lasix 1 week ago.  Blood pressure was low today at 92/56 and recheck of 100/54.  He denies any lightheadedness.  He denies orthopnea and chest pain.      His home weight is around 171 pounds.  He is following a low-sodium diet.    ECHO:   Results for orders placed during the hospital encounter of 09/13/19   Echo Complete [ECH10] 09/13/2019    Narrative   Normal left ventricular size and wall thickness.    Left ventricle ejection fraction is mildly decreased. The calculated   left ventricular ejection fraction is 51%.    Normal right ventricular size with reduced systolic function. A   pacemaker is present.    Mild mitral regurgitation.    When compared to the previous study dated 9/8/2014, findings are   similar.           Physical Examination Review of Systems   Vitals:    09/19/19 0956   BP: 100/54   Pulse:    Resp:      Body mass index is 25.07 kg/m .  Wt Readings from Last 3 Encounters:   09/19/19 171 lb (77.6 kg)   09/13/19 174 lb 4 oz (79 kg)   09/11/19 176 lb (79.8 kg)       General Appearance:     Alert, cooperative and in no acute distress.   ENT/Mouth: membranes moist, no oral lesions or bleeding gums.      EYES:  no scleral icterus, normal conjunctivae   Chest/Lungs:   lungs are clear to auscultation, no rales or wheezing, respirations unlabored   Cardiovascular:   Regular. Normal first and second heart sounds, 1+ edema bilateral lower extremities    Abdomen:  Soft, nontender, nondistended, bowel sounds present   Extremities: no cyanosis or clubbing   Skin: warm, dry.    Neurologic: mood and affect are appropriate, alert and oriented x3      General: Weight Gain  Eyes: WNL  Ears/Nose/Throat: WNL  Lungs: Shortness of Breath  Heart: Shortness of Breath with activity  Stomach: WNL  Bladder: Frequent Urination at Night  Muscle/Joints: Muscle Weakness  Skin: WNL  Nervous System: Loss of  Balance  Mental Health: WNL     Blood: Easy Bruising     Medical History  Surgical History Family History Social History   Past Medical History:   Diagnosis Date   ? Actinic keratosis    ? Acute posthemorrhagic anemia    ? Anemia    ? Anxiety disorder due to general medical condition    ? Atrial flutter (H)    ? CAD (coronary artery disease)    ? Cardiomyopathy (H)    ? CHF (congestive heart failure) (H)    ? Chronic reflux esophagitis    ? Chronic systolic heart failure (H)     Created by [facility] Annotation: Mar 18 2013  1:29PM - Dave Segundo: diastolic  Replacement Utility updated for latest IMO load   ? Constipation    ? Decubitus ulcer    ? Fatigue    ? Fatigue    ? Foot pain    ? Hemorrhoids    ? HTN (hypertension)    ? Insomnia    ? Lipoma     adipose tissue   ? Major depression, chronic    ? 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 Pleural effusion    ? Polyuria    ? Slurred speech     few months after open heart   ? SSS (sick sinus syndrome) (H)    ? Transient global amnesia    ? Vasovagal syncope     Past Surgical History:   Procedure Laterality Date   ? CARDIAC PACEMAKER PLACEMENT     ? [place] ABLATE HEART DYSRHYTHM FOCUS      Description: Catheter Ablation Atrial Fibrillation;  Recorded: 02/05/2014;  Comments: PVI Aug 2009: (PVI-RF + roof line)   ? [place] CABG, VEIN, SINGLE      Description: CABG (CABG);  Proc Date: 02/07/2013;  Comments: Dr. Jatinder Simpson at [facility] ; Feb 2013: CAB X 3 (LIMA to LAD, SVG to OM, SVG to PDA); ; Reopened same day for RA bleeding   ? [place] REMOVAL OF TONSILS,<13 Y/O      Description: Tonsillectomy;  Recorded: 03/26/2013;   ? TOTAL HIP ARTHROPLASTY      bilateral    Family History   Problem Relation Age of Onset   ? Heart attack Father 76    Social History     Socioeconomic History   ? Marital status:      Spouse name: Not on file   ? Number of children: Not on file   ? Years of education: Not on file   ? Highest education level: Not on file   Occupational  History   ? Not on file   Social Needs   ? Financial resource strain: Not on file   ? Food insecurity:     Worry: Not on file     Inability: Not on file   ? Transportation needs:     Medical: Not on file     Non-medical: Not on file   Tobacco Use   ? Smoking status: Never Smoker   ? Smokeless tobacco: Never Used   Substance and Sexual Activity   ? Alcohol use: Yes     Alcohol/week: 0.6 oz     Types: 1 Cans of beer per week   ? Drug use: No   ? Sexual activity: Not on file   Lifestyle   ? Physical activity:     Days per week: Not on file     Minutes per session: Not on file   ? Stress: Not on file   Relationships   ? Social connections:     Talks on phone: Not on file     Gets together: Not on file     Attends [employer]ational service: Not on file     Active member of club or organization: Not on file     Attends meetings of clubs or organizations: Not on file     Relationship status: Not on file   ? Intimate partner violence:     Fear of current or ex partner: Not on file     Emotionally abused: Not on file     Physically abused: Not on file     Forced sexual activity: Not on file   Other Topics Concern   ? Not on file   Social History Narrative   ? Not on file          Medications  Allergies   Current Outpatient Medications   Medication Sig Dispense Refill   ? ELIQUIS 5 mg Tab tablet TAKE 1 TABLET BY MOUTH TWICE DAILY 180 tablet 1   ? furosemide (LASIX) 20 MG tablet Take 2 tablets (40 mg total) by mouth 2 (two) times a day at 9am and 6pm. 360 tablet 3   ? gabapentin (NEURONTIN) 300 MG capsule TAKE 1 CAPSULE BY MOUTH 3 TIMES PER DAY.  MAY TAKE 2 TO 3 CAPSULES BY MOUTH DAILY, DOES NOT NEED TO BE 3 SEPERATE TIMES.  (Patient taking differently: TAKE 1 CAPSULE BY MOUTH 3 TIMES PER DAY.  MAY TAKE 2 TO 4 CAPSULES BY MOUTH DAILY, DOES NOT NEED TO BE 3 SEPERATE TIMES.) 270 capsule 3   ? MELATONIN ORAL Take by mouth.     ? metoprolol succinate (TOPROL-XL) 25 MG TAKE 2 TABLETS BY MOUTH DAILY 180 tablet 2   ? mirtazapine (REMERON)  7.5 MG tablet TAKE 1 TO 3 TABLETS BY MOUTH AT BEDTIME. 270 tablet 3   ? valACYclovir (VALTREX) 500 MG tablet 500 mg daily.        No current facility-administered medications for this visit.       Allergies   Allergen Reactions   ? Zolpidem Other (See Comments)     Hallucinations needed to be restrained in hospital   ? Beta-Blockers (Beta-Adrenergic Blocking Agts) Other (See Comments)     Fatigue if metoprolol > 12.5 mg   ? Diphenhydramine Hcl Other (See Comments)     tachycardia         Lab Results    Chemistry CBC BNP   Lab Results   Component Value Date    CREATININE 0.89 09/11/2019    BUN 24 09/11/2019     (L) 09/11/2019    K 5.0 09/11/2019    CL 92 (L) 09/11/2019    CO2 29 09/11/2019     Creatinine (mg/dL)   Date Value   09/11/2019 0.89   06/14/2019 0.86   07/30/2018 0.84   06/25/2018 0.85    Lab Results   Component Value Date    WBC 5.5 06/14/2019    HGB 12.6 (L) 06/14/2019    HCT 37.4 (L) 06/14/2019    MCV 93 06/14/2019     06/14/2019    Lab Results   Component Value Date    BNP 1,075 (H) 09/11/2019     BNP (pg/mL)   Date Value   09/11/2019 1,075 (H)   06/25/2018 196 (H)   02/26/2013 726 (H)            Kelli Mendoza, [facility] Heart [facility]   Heart Failure Clinic

## 2021-06-28 NOTE — PROGRESS NOTES
Progress Notes by Homa Johnson CNP at 12/9/2019  9:10 AM     Author: Homa Johnson CNP Service: -- Author Type: Nurse Practitioner    Filed: 12/9/2019  9:04 AM Encounter Date: 12/9/2019 Status: Signed    : Homa Johnson CNP (Nurse Practitioner)             Assessment/Recommendations   Assessment:    1.  Heart failure with preserved ejection fraction, NYHA class II: Compensated.  He states his symptoms have been stable.  His weight is stable.  He had an upgrade of his pacemaker to a CRT-P December 2, 2019.  His BIV pacing is 99% today.    2.  Hypertension: Controlled.  Blood pressure 110/60    3.  Persistent atrial fibrillation: Rate controlled on device check.  He continues Eliquis for anticoagulation.  He is on Toprol for rate control.    Plan:  1.   Continue current medications  2.  Continue daily weights and low-sodium diet  3.  Encouraged regular exercise    Frank Robles will follow up with Dr. aHrtman in May and in the heart failure clinic in 2 months.     History of Present Illness/Subjective    Mr. Frank Robles is a 85 y.o. male seen at [facility] heart failure clinic today for continued follow-up.  His wife and son accompany him today.  He follows up for heart failure with preserved ejection fraction.  His last echocardiogram was done September 2019 which showed an ejection fraction of 51% with mild mitral regurgitation.  He has a past medical history significant for hypertension, coronary artery disease, persistent atrial fibrillation he had a pacemaker placed September 2014 with an upgrade to CRT P December 2, 2019.    Today, he comes in with mild fatigue and dyspnea on exertion.  He denies orthopnea or PND.  He has trace lower extremity edema.  He denies lightheadedness, shortness of breath, orthopnea, PND, palpitations, chest pain and abdominal fullness/bloating.      He is monitoring home weights which are stable.  He is following a low sodium diet.       ECHOCARDIOGRAM: 9/13/2019  Summary       Normal left ventricular size and wall thickness.    Left ventricle ejection fraction is mildly decreased. The calculated left ventricular ejection fraction is 51%.    Normal right ventricular size with reduced systolic function. A pacemaker is present.    Mild mitral regurgitation.    When compared to the previous study dated 9/8/2014, findings are similar.          Physical Examination Review of Systems   Vitals:    12/09/19 0749   BP: 110/60   Pulse: 84   Resp: 16   Temp: 98.2  F (36.8  C)     Body mass index is 23.48 kg/m .  Wt Readings from Last 3 Encounters:   12/09/19 159 lb (72.1 kg)   12/03/19 150 lb 9.6 oz (68.3 kg)   11/21/19 162 lb (73.5 kg)       General Appearance:   no distress, normal body habitus   ENT/Mouth: membranes moist, no oral lesions or bleeding gums.      EYES:  no scleral icterus, normal conjunctivae   Neck: no carotid bruits or thyromegaly   Chest/Lungs:   lungs are clear to auscultation, no rales or wheezing, equal chest wall expansion    Cardiovascular:   Regular. Normal first and second heart sounds with no murmurs, rubs, or gallops; Jugular venous pressure normal, trace edema bilaterally    Abdomen:  no organomegaly, masses, bruits, or tenderness; bowel sounds are present   Extremities: no cyanosis or clubbing   Skin: no xanthelasma, warm. Incision clean, dry and intact   Neurologic: normal  bilateral, no tremors     Psychiatric: alert and oriented x3    General: WNL  Eyes: WNL  Ears/Nose/Throat: WNL  Lungs: WNL  Heart: WNL  Stomach: WNL  Bladder: WNL  Muscle/Joints: WNL  Skin: WNL  Nervous System: WNL  Mental Health: WNL     Blood: WNL     Medical History  Surgical History Family History Social History   Past Medical History:   Diagnosis Date   ? Actinic keratosis    ? Acute posthemorrhagic anemia    ? Anemia    ? Anxiety disorder due to general medical condition    ? Atrial fibrillation (H)    ? Atrial flutter (H)    ? Biventricular  cardiac pacemaker in situ     Previous right-sided implant  Medtronic [id number] Advisa DR CARBAJAL DOI: 9/8/2014 Upgrade to CRT-P: Dec 2, 2019   ? CAD (coronary artery disease)    ? Cardiomyopathy (H)    ? CHF (congestive heart failure) (H)    ? Chronic reflux esophagitis    ? Chronic systolic heart failure (H)     Created by [facility] Annotation: Mar 18 2013  1:29PM - aDve Segundo: diastolic  Replacement Utility updated for latest IMO load   ? Constipation    ? Decubitus ulcer    ? Fatigue    ? Fatigue    ? Foot pain    ? Hemorrhoids    ? HTN (hypertension)    ? Insomnia    ? Lipoma     adipose tissue   ? Major depression, chronic    ? 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 Pleural effusion    ? Polyuria    ? Slurred speech     few months after open heart   ? SSS (sick sinus syndrome) (H)    ? Stroke (H)    ? Third degree AV block (H) 12/2/2019   ? Transient global amnesia    ? Vasovagal syncope     Past Surgical History:   Procedure Laterality Date   ? CARDIAC PACEMAKER PLACEMENT     ? IR EXTREMITY VENOGRAM LEFT  9/23/2019   ? MS ABLATE HEART DYSRHYTHM FOCUS      Description: Catheter Ablation Atrial Fibrillation;  Recorded: 02/05/2014;  Comments: PVI Aug 2009: (PVI-RF + roof line)   ? MS CABG, VEIN, SINGLE      Description: CABG (CABG);  Proc Date: 02/07/2013;  Comments: Dr. Jatinder Simpson at [facility] ; Feb 2013: CAB X 3 (LIMA to LAD, SVG to OM, SVG to PDA); ; Reopened same day for RA bleeding   ? MS REMOVAL OF TONSILS,<13 Y/O      Description: Tonsillectomy;  Recorded: 03/26/2013;   ? TOTAL HIP ARTHROPLASTY      bilateral    Family History   Problem Relation Age of Onset   ? Heart attack Father 76    Social History     Socioeconomic History   ? Marital status:      Spouse name: Not on file   ? Number of children: Not on file   ? Years of education: Not on file   ? Highest education level: Not on file   Occupational History   ? Not on file   Social Needs   ? Financial resource strain: Not on file    ? Food insecurity:     Worry: Not on file     Inability: Not on file   ? Transportation needs:     Medical: Not on file     Non-medical: Not on file   Tobacco Use   ? Smoking status: Never Smoker   ? Smokeless tobacco: Never Used   Substance and Sexual Activity   ? Alcohol use: Not Currently     Alcohol/week: 1.0 standard drinks     Types: 1 Cans of beer per week   ? Drug use: No   ? Sexual activity: Not on file   Lifestyle   ? Physical activity:     Days per week: Not on file     Minutes per session: Not on file   ? Stress: Not on file   Relationships   ? Social connections:     Talks on phone: Not on file     Gets together: Not on file     Attends Restorationism service: Not on file     Active member of club or organization: Not on file     Attends meetings of clubs or organizations: Not on file     Relationship status: Not on file   ? Intimate partner violence:     Fear of current or ex partner: Not on file     Emotionally abused: Not on file     Physically abused: Not on file     Forced sexual activity: Not on file   Other Topics Concern   ? Not on file   Social History Narrative   ? Not on file          Medications  Allergies   Current Outpatient Medications   Medication Sig Dispense Refill   ? ELIQUIS 5 mg Tab tablet TAKE 1 TABLET BY MOUTH TWICE DAILY 180 tablet 1   ? furosemide (LASIX) 20 MG tablet Take 2 tablets (40 mg total) by mouth 2 (two) times a day at 9am and 6pm. 360 tablet 3   ? gabapentin (NEURONTIN) 300 MG capsule Take 3 capsules (900 mg total) by mouth at bedtime.     ? gabapentin (NEURONTIN) 300 MG capsule Take 1 capsule (300 mg total) by mouth every morning.     ? MELATONIN ORAL Take 3 mg by mouth at bedtime. Takes 1/4 tablet     ? metoprolol succinate (TOPROL-XL) 25 MG TAKE 2 TABLETS BY MOUTH DAILY 180 tablet 2   ? mirtazapine (REMERON) 7.5 MG tablet TAKE 1 TO 3 TABLETS BY MOUTH AT BEDTIME. 270 tablet 3   ? valACYclovir (VALTREX) 500 MG tablet 500 mg daily.        No current facility-administered  medications for this visit.     Allergies   Allergen Reactions   ? Zolpidem Other (See Comments)     Hallucinations needed to be restrained in hospital   ? Beta-Blockers (Beta-Adrenergic Blocking Agts) Other (See Comments)     Fatigue if metoprolol > 12.5 mg   ? Diphenhydramine Hcl Other (See Comments)     tachycardia         Lab Results    Chemistry/lipid CBC Cardiac Enzymes/BNP/TSH/INR   Lab Results   Component Value Date    CHOL 143 06/14/2019    HDL 45 06/14/2019    LDLCALC 88 06/14/2019    TRIG 52 06/14/2019    CREATININE 0.95 12/02/2019    BUN 20 12/02/2019    K 3.9 12/02/2019     (L) 12/02/2019    CL 95 (L) 12/02/2019    CO2 33 (H) 12/02/2019    Lab Results   Component Value Date    WBC 5.3 12/02/2019    HGB 12.3 (L) 12/02/2019    HCT 37.4 (L) 12/02/2019    MCV 92 12/02/2019     12/02/2019    Lab Results   Component Value Date    CKTOTAL 158 02/17/2013    CKMB 5 05/27/2013    TROPONINI 0.02 05/27/2013    BNP 1,075 (H) 09/11/2019    TSH 2.07 09/21/2017    INR 1.10 10/26/2017             This note has been dictated using voice recognition software. Any grammatical, typographical, or context distortions are unintentional and inherent to the software

## 2021-06-28 NOTE — PROGRESS NOTES
Progress Notes by Padmini Hendrickson MD at 3/5/2020  8:50 AM     Author: Padmini Hendrickson MD Service: -- Author Type: Physician    Filed: 3/5/2020  9:35 AM Encounter Date: 3/5/2020 Status: Signed    : Padmini Hendrickson MD (Physician)           HEART CARE NOTE    Thank you, Dr. Auguste, for asking the [facility] Heart Care team to see Frank Robles to evaluate acute decompensated heart failure    Assessment/Recommendations     1. HFpEF  Assessment / Plan    Hypervolemic on physical exam --> instructed to take metolazone 2.5 mg one time daily x2 days with his loop diuretic. Also instructed to have repeat lab work on Monday.    Will switch to torsemide 50 mg one time daily in hopes of better absorption/bioavailability    Heart Care clinic will follow-up on Monday. Wife should be contacted first. If she is unavailable please try Son (Al) @ [phone]      2. HTN  Assessment / Plan    Well controlled on current regimen       3. Atrial fibrillation  Assessment / Plan    On apixaban and metoprolol succinate      4. SSS  Assessment / Plan    S/p CRT-P      History of Present Illness/Subjective    Mr. Frank Robles is a 85 y.o. male with a PMHx significant for of hypertension, heart failure with preserved ejection fraction, and persistent atrial flutter/fib.  Echocardiogram on September 13, 2019 showed ejection fraction of 51% and mild mitral regurgitation.  He had an upgrade to CRT-P on December 2, 2019 due to high RV pacing.    Today, Mr. Robles denies any symptoms of orthopnea or PND, but does endorse LE edema and progressive dyspnea on exertion x 1-2 weeks. Patient and wife both report decreased appetite/PO intake over the last few weeks while patient continues to gain weight. He denies chest pain/anginal equivalents, palpitations, lightheadedness, presyncope/syncope, nausea.     ECG: Personally reviewed. Paced rhythm    ECHO (personnaly Reviewed):    Normal left ventricular  size and wall thickness.    Left ventricle ejection fraction is mildly decreased. The calculated left ventricular ejection fraction is 51%.    Normal right ventricular size with reduced systolic function. A pacemaker is present.    Mild mitral regurgitation.    When compared to the previous study dated 9/8/2014, findings are similar.        Physical Examination Review of Systems     Vitals:    03/05/20 0854   BP: 118/76   Pulse: 76   Resp: 16     Body mass index is 25.55 kg/m .  Wt Readings from Last 3 Encounters:   03/05/20 173 lb (78.5 kg)   03/02/20 172 lb (78 kg)   02/17/20 171 lb (77.6 kg)     General Appearance:   Alert, cooperative, no distress, appears stated age   Head/ENT: Normocephalic, without obvious abnormality. Membranes moist.      EYES:  No scleral icterus   Neck: Supple, symmetrical, trachea midline, no adenopathy, thyroid: not enlarged, symmetric, no carotid bruit; + JVD to the angle of the jaw   Chest/Lungs:   lungs are clear to auscultation, respirations unlabored. No tenderness or deformity   Cardiovascular:   Regular rhythm, S1, S2 normal, no murmur, rub or gallop.   Abdomen:  Soft, non-tender, bowel sounds active all four quadrants,  no masses, no organomegaly   Extremities: no cyanosis or clubbing. 1-2+ LE pitting edema   Skin: Skin color, texture, turgor normal, no rashes or lesions.    Neurologic: Alert and oriented x 3, moving all four extremities.    Psychiatric: Normal affect.      A complete 10 systems ROS was reviewed  And is negative except what is listed in the HPI.        Medical History  Surgical History Family History Social History   Past Medical History:   Diagnosis Date   ? Actinic keratosis    ? Acute posthemorrhagic anemia    ? Anemia    ? Anxiety disorder due to general medical condition    ? Atrial fibrillation (H)    ? Atrial flutter (H)    ? Biventricular cardiac pacemaker in situ     Previous right-sided implant  Medtronic [id number] Advisa DR CARBAJAL DOI: 9/8/2014 Upgrade to  CRT-P: Dec 2, 2019   ? CAD (coronary artery disease)    ? Cardiomyopathy (H)    ? CHF (congestive heart failure) (H)    ? Chronic reflux esophagitis    ? Chronic systolic heart failure (H)     Created by [facility] Annotation: Mar 18 2013  1:29PM - Dave Segundo: diastolic  Replacement Utility updated for latest IMO load   ? Constipation    ? Decubitus ulcer    ? Fatigue    ? Fatigue    ? Foot pain    ? Hemorrhoids    ? HTN (hypertension)    ? Insomnia    ? Lipoma     adipose tissue   ? Major depression, chronic    ? 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 Pleural effusion    ? Polyuria    ? Slurred speech     few months after open heart   ? SSS (sick sinus syndrome) (H)    ? Stroke (H)    ? Third degree AV block (H) 12/2/2019   ? Transient global amnesia    ? Vasovagal syncope     Past Surgical History:   Procedure Laterality Date   ? CARDIAC PACEMAKER PLACEMENT     ? IR EXTREMITY VENOGRAM LEFT  9/23/2019   ? WA ABLATE HEART DYSRHYTHM FOCUS      Description: Catheter Ablation Atrial Fibrillation;  Recorded: 02/05/2014;  Comments: PVI Aug 2009: (PVI-RF + roof line)   ? WA CABG, VEIN, SINGLE      Description: CABG (CABG);  Proc Date: 02/07/2013;  Comments: Dr. Jatinder Simpson at [facility] ; Feb 2013: CAB X 3 (LIMA to LAD, SVG to OM, SVG to PDA); ; Reopened same day for RA bleeding   ? WA REMOVAL OF TONSILS,<13 Y/O      Description: Tonsillectomy;  Recorded: 03/26/2013;   ? TOTAL HIP ARTHROPLASTY      bilateral    no family history of premature coronary artery disease Social History     Socioeconomic History   ? Marital status:      Spouse name: Not on file   ? Number of children: Not on file   ? Years of education: Not on file   ? Highest education level: Not on file   Occupational History   ? Not on file   Social Needs   ? Financial resource strain: Not on file   ? Food insecurity:     Worry: Not on file     Inability: Not on file   ? Transportation needs:     Medical: Not on file     Non-medical:  Not on file   Tobacco Use   ? Smoking status: Never Smoker   ? Smokeless tobacco: Never Used   Substance and Sexual Activity   ? Alcohol use: Not Currently     Alcohol/week: 1.0 standard drinks     Types: 1 Cans of beer per week   ? Drug use: No   ? Sexual activity: Not on file   Lifestyle   ? Physical activity:     Days per week: Not on file     Minutes per session: Not on file   ? Stress: Not on file   Relationships   ? Social connections:     Talks on phone: Not on file     Gets together: Not on file     Attends Worship service: Not on file     Active member of club or organization: Not on file     Attends meetings of clubs or organizations: Not on file     Relationship status: Not on file   ? Intimate partner violence:     Fear of current or ex partner: Not on file     Emotionally abused: Not on file     Physically abused: Not on file     Forced sexual activity: Not on file   Other Topics Concern   ? Not on file   Social History Narrative   ? Not on file           Lab Results    Chemistry/lipid CBC Cardiac Enzymes/BNP/TSH/INR   Lab Results   Component Value Date    CHOL 143 06/14/2019    HDL 45 06/14/2019    LDLCALC 88 06/14/2019    TRIG 52 06/14/2019    CREATININE 0.95 12/02/2019    BUN 20 12/02/2019    K 3.9 12/02/2019     (L) 12/02/2019    CL 95 (L) 12/02/2019    CO2 33 (H) 12/02/2019    Lab Results   Component Value Date    WBC 5.3 12/02/2019    HGB 12.3 (L) 12/02/2019    HCT 37.4 (L) 12/02/2019    MCV 92 12/02/2019     12/02/2019    Lab Results   Component Value Date    CKTOTAL 158 02/17/2013    CKMB 5 05/27/2013    TROPONINI 0.02 05/27/2013    BNP 1,075 (H) 09/11/2019    TSH 2.07 09/21/2017    INR 1.10 10/26/2017     Lab Results   Component Value Date    CKTOTAL 158 02/17/2013    CKMB 5 05/27/2013    TROPONINI 0.02 05/27/2013          Weight:    Wt Readings from Last 3 Encounters:   03/05/20 173 lb (78.5 kg)   03/02/20 172 lb (78 kg)   02/17/20 171 lb (77.6 kg)       Allergies  Allergies    Allergen Reactions   ? Zolpidem Other (See Comments)     Hallucinations needed to be restrained in hospital   ? Beta-Blockers (Beta-Adrenergic Blocking Agts) Other (See Comments)     Fatigue if metoprolol > 12.5 mg   ? Diphenhydramine Hcl Other (See Comments)     tachycardia         Surgical History  Past Surgical History:   Procedure Laterality Date   ? CARDIAC PACEMAKER PLACEMENT     ? IR EXTREMITY VENOGRAM LEFT  9/23/2019   ? ME ABLATE HEART DYSRHYTHM FOCUS      Description: Catheter Ablation Atrial Fibrillation;  Recorded: 02/05/2014;  Comments: PVI Aug 2009: (PVI-RF + roof line)   ? ME CABG, VEIN, SINGLE      Description: CABG (CABG);  Proc Date: 02/07/2013;  Comments: Dr. Jatinder Simpson at [facility]; ; Feb 2013: CAB X 3 (LIMA to LAD, SVG to OM, SVG to PDA); ; Reopened same day for RA bleeding   ? ME REMOVAL OF TONSILS,<11 Y/O      Description: Tonsillectomy;  Recorded: 03/26/2013;   ? TOTAL HIP ARTHROPLASTY      bilateral       Social History  Tobacco:   Social History     Tobacco Use   Smoking Status Never Smoker   Smokeless Tobacco Never Used    Alcohol:   Social History     Substance and Sexual Activity   Alcohol Use Not Currently   ? Alcohol/week: 1.0 standard drinks   ? Types: 1 Cans of beer per week    Illicit Drugs:   Social History     Substance and Sexual Activity   Drug Use No       Family History  Family History   Problem Relation Age of Onset   ? Heart attack Father 76          Mason Hendrickson on 3/5/2020      cc: Timbo Auguste MD,

## 2021-06-28 NOTE — PROGRESS NOTES
Progress Notes by Kelli Mendoza CNP at 2/17/2020  2:10 PM     Author: Kelli Mendoza CNP Service: -- Author Type: Nurse Practitioner    Filed: 2/17/2020  2:33 PM Encounter Date: 2/17/2020 Status: Signed    : Kelli Mendoza CNP (Nurse Practitioner)             Assessment/Recommendations   Assessment:    1.  Heart failure with preserved ejection fraction, EF 51%: Shortness of breath with walking upstairs and lower extremity edema are at baseline.  Although he has gained 6 pounds over the past few months, there are no symptoms of acute fluid retention.    2.  Persistent atrial flutter: He continues to take Eliquis.  Device check in January did show atrial flutter but he was asymptomatic.    3.  CRT-P: Upgraded on December 2, 2019 due to high RV pacing.  He will have a device check on March 2.    Plan:  1.  Continue current medications  2.  Continue low-sodium diet and daily weights    Bernard will follow up with Dr. Hartman in May and in the heart failure clinic in 6 months.       History of Present Illness/Subjective    Mr. Frank Robles is a 85 y.o. male seen at [facility] heart failure clinic today for continued follow-up.  He has a history of hypertension, heart failure with preserved ejection fraction, and persistent atrial flutter.  Echocardiogram on September 13, 2019 showed ejection fraction of 51% and mild mitral regurgitation.  He had an upgrade to CRT-P on December 2, 2019 due to high RV pacing.    He continues to do well with no symptoms of acute heart failure.  He has gained about 6 pounds in the past few months which he correlates to inactivity in the winter.  He has chronic shortness of breath with walking upstairs but denies any worsening.  He denies orthopnea.  He has chronic lower extremity edema which is at baseline.  He denies lightheadedness and chest pain.        ECHO:   Results for orders placed during the hospital encounter of 09/13/19   Echo Complete  [ECH10] 09/13/2019    Narrative   Normal left ventricular size and wall thickness.    Left ventricle ejection fraction is mildly decreased. The calculated   left ventricular ejection fraction is 51%.    Normal right ventricular size with reduced systolic function. A   pacemaker is present.    Mild mitral regurgitation.    When compared to the previous study dated 9/8/2014, findings are   similar.           Physical Examination Review of Systems   Vitals:    02/17/20 1408   BP: 124/70   Pulse: 84   Resp: 16     Body mass index is 25.25 kg/m .  Wt Readings from Last 3 Encounters:   02/17/20 171 lb (77.6 kg)   12/09/19 159 lb (72.1 kg)   12/03/19 150 lb 9.6 oz (68.3 kg)       General Appearance:     Alert, cooperative and in no acute distress.   ENT/Mouth: membranes moist, no oral lesions or bleeding gums.      EYES:  no scleral icterus, normal conjunctivae   Chest/Lungs:   lungs are clear to auscultation, no rales or wheezing, respirations unlabored   Cardiovascular:   Regular. Normal first and second heart sounds, trace edema bilateral lower extremities    Abdomen:  Soft, nontender, nondistended, bowel sounds present   Extremities: no cyanosis or clubbing   Skin: warm, dry.    Neurologic: mood and affect are appropriate, alert and oriented x3      General: WNL  Eyes: WNL  Ears/Nose/Throat: WNL  Lungs: WNL  Heart: Shortness of Breath with activity  Stomach: WNL  Bladder: WNL  Muscle/Joints: WNL  Skin: WNL  Nervous System: WNL  Mental Health: WNL     Blood: WNL     Medical History  Surgical History Family History Social History   Past Medical History:   Diagnosis Date   ? Actinic keratosis    ? Acute posthemorrhagic anemia    ? Anemia    ? Anxiety disorder due to general medical condition    ? Atrial fibrillation (H)    ? Atrial flutter (H)    ? Biventricular cardiac pacemaker in situ     Previous right-sided implant  Medtronic [id number] Advisa DR CARBAJAL DOI: 9/8/2014 Upgrade to CRT-P: Dec 2, 2019   ? CAD (coronary artery  disease)    ? Cardiomyopathy (H)    ? CHF (congestive heart failure) (H)    ? Chronic reflux esophagitis    ? Chronic systolic heart failure (H)     Created by [facility] Annotation: Mar 18 2013  1:29PM - Dave Segundo: diastolic  Replacement Utility updated for latest IMO load   ? Constipation    ? Decubitus ulcer    ? Fatigue    ? Fatigue    ? Foot pain    ? Hemorrhoids    ? HTN (hypertension)    ? Insomnia    ? Lipoma     adipose tissue   ? Major depression, chronic    ? 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 Pleural effusion    ? Polyuria    ? Slurred speech     few months after open heart   ? SSS (sick sinus syndrome) (H)    ? Stroke (H)    ? Third degree AV block (H) 12/2/2019   ? Transient global amnesia    ? Vasovagal syncope     Past Surgical History:   Procedure Laterality Date   ? CARDIAC PACEMAKER PLACEMENT     ? IR EXTREMITY VENOGRAM LEFT  9/23/2019   ? MO ABLATE HEART DYSRHYTHM FOCUS      Description: Catheter Ablation Atrial Fibrillation;  Recorded: 02/05/2014;  Comments: PVI Aug 2009: (PVI-RF + roof line)   ? MO CABG, VEIN, SINGLE      Description: CABG (CABG);  Proc Date: 02/07/2013;  Comments: Dr. Jatinder Simpson at [facility] ; Feb 2013: CAB X 3 (LIMA to LAD, SVG to OM, SVG to PDA); ; Reopened same day for RA bleeding   ? MO REMOVAL OF TONSILS,<11 Y/O      Description: Tonsillectomy;  Recorded: 03/26/2013;   ? TOTAL HIP ARTHROPLASTY      bilateral    Family History   Problem Relation Age of Onset   ? Heart attack Father 76    Social History     Socioeconomic History   ? Marital status:      Spouse name: Not on file   ? Number of children: Not on file   ? Years of education: Not on file   ? Highest education level: Not on file   Occupational History   ? Not on file   Social Needs   ? Financial resource strain: Not on file   ? Food insecurity:     Worry: Not on file     Inability: Not on file   ? Transportation needs:     Medical: Not on file     Non-medical: Not on file   Tobacco  Use   ? Smoking status: Never Smoker   ? Smokeless tobacco: Never Used   Substance and Sexual Activity   ? Alcohol use: Not Currently     Alcohol/week: 1.0 standard drinks     Types: 1 Cans of beer per week   ? Drug use: No   ? Sexual activity: Not on file   Lifestyle   ? Physical activity:     Days per week: Not on file     Minutes per session: Not on file   ? Stress: Not on file   Relationships   ? Social connections:     Talks on phone: Not on file     Gets together: Not on file     Attends Anabaptism service: Not on file     Active member of club or organization: Not on file     Attends meetings of clubs or organizations: Not on file     Relationship status: Not on file   ? Intimate partner violence:     Fear of current or ex partner: Not on file     Emotionally abused: Not on file     Physically abused: Not on file     Forced sexual activity: Not on file   Other Topics Concern   ? Not on file   Social History Narrative   ? Not on file          Medications  Allergies   Current Outpatient Medications   Medication Sig Dispense Refill   ? ELIQUIS 5 mg Tab tablet TAKE 1 TABLET BY MOUTH TWICE DAILY 180 tablet 1   ? furosemide (LASIX) 20 MG tablet Take 2 tablets (40 mg total) by mouth 2 (two) times a day at 9am and 6pm. 360 tablet 3   ? gabapentin (NEURONTIN) 300 MG capsule Take 3 capsules (900 mg total) by mouth at bedtime.     ? gabapentin (NEURONTIN) 300 MG capsule Take 1 capsule (300 mg total) by mouth every morning.     ? MELATONIN ORAL Take 3 mg by mouth at bedtime. Takes 1/4 tablet     ? metoprolol succinate (TOPROL-XL) 25 MG TAKE 2 TABLETS BY MOUTH DAILY 180 tablet 2   ? mirtazapine (REMERON) 7.5 MG tablet TAKE 1 TO 3 TABLETS BY MOUTH AT BEDTIME. 270 tablet 3   ? valACYclovir (VALTREX) 500 MG tablet 500 mg daily.        No current facility-administered medications for this visit.     Allergies   Allergen Reactions   ? Zolpidem Other (See Comments)     Hallucinations needed to be restrained in hospital   ?  Beta-Blockers (Beta-Adrenergic Blocking Agts) Other (See Comments)     Fatigue if metoprolol > 12.5 mg   ? Diphenhydramine Hcl Other (See Comments)     tachycardia         Lab Results    Chemistry/lipid CBC Cardiac Enzymes/BNP/TSH/INR   Lab Results   Component Value Date    CHOL 143 06/14/2019    HDL 45 06/14/2019    LDLCALC 88 06/14/2019    TRIG 52 06/14/2019    CREATININE 0.95 12/02/2019    BUN 20 12/02/2019    K 3.9 12/02/2019     (L) 12/02/2019    CL 95 (L) 12/02/2019    CO2 33 (H) 12/02/2019    Lab Results   Component Value Date    WBC 5.3 12/02/2019    HGB 12.3 (L) 12/02/2019    HCT 37.4 (L) 12/02/2019    MCV 92 12/02/2019     12/02/2019    Lab Results   Component Value Date    CKTOTAL 158 02/17/2013    CKMB 5 05/27/2013    TROPONINI 0.02 05/27/2013    BNP 1,075 (H) 09/11/2019    TSH 2.07 09/21/2017    INR 1.10 10/26/2017

## 2021-06-28 NOTE — PROGRESS NOTES
Progress Notes by Kelli Mendoza CNP at 10/9/2019  9:10 AM     Author: Kelli Mendoza CNP Service: -- Author Type: Nurse Practitioner    Filed: 10/9/2019  9:41 AM Encounter Date: 10/9/2019 Status: Signed    : Kelli Mendoza CNP (Nurse Practitioner)             Assessment/Recommendations   Assessment:    1.  Heart failure with preserved ejection fraction, EF 51%: He no longer has shortness of breath and only has trace edema since increasing Lasix last month.  His weight has decreased 10 pounds in the past 3 weeks.  He states that his home weights have now stabilized.    2.  Persistent atrial flutter: He continues to take Eliquis.    3.  Permanent pacemaker: He had venogram on September 23 and Dr. Segundo has recommended upgrade to CRT-P.  Bernard is in agreement with this plan and would like to schedule.    Plan:  1.  BMP pending.  If renal function is stable, continue current diuretic dose.  If renal function is abnormal, I will decrease Lasix.  2.  Continue low-sodium diet and daily weights    Frank Robles will follow up in the heart failure clinic following CRT P upgrade.       History of Present Illness/Subjective    Mr. Frank Robles is a 85 y.o. male seen at [facility] heart failure clinic today for continued follow-up.  He has a history of hypertension, heart failure with preserved ejection fraction, and persistent atrial flutter.  Echocardiogram on September 13, 2019 showed ejection fraction of 51% and mild mitral regurgitation.    His Lasix was increased in early September due to acute on chronic heart failure symptoms.  Dr. Segundo also adjusted his pacemaker settings.  He had a venogram on September 23 and Dr. Segundo is planning to upgrade his device to a CRT P.  Since increasing diuretic, he no longer has shortness of breath with activity and his edema has improved.  His weight has decreased 10 pounds in the past 3 weeks.  He is unsure what his weight is at home but  states that it has stabilized now.  He denies lightheadedness, shortness of breath, dyspnea on exertion, orthopnea and chest pain.        ECHO:   Results for orders placed during the hospital encounter of 09/13/19   Echo Complete [ECH10] 09/13/2019    Narrative   Normal left ventricular size and wall thickness.    Left ventricle ejection fraction is mildly decreased. The calculated   left ventricular ejection fraction is 51%.    Normal right ventricular size with reduced systolic function. A   pacemaker is present.    Mild mitral regurgitation.    When compared to the previous study dated 9/8/2014, findings are   similar.           Physical Examination Review of Systems   Vitals:    10/09/19 0907   BP: 104/52   Pulse: 68   Resp: 14   SpO2: 92%     Body mass index is 23.6 kg/m .  Wt Readings from Last 3 Encounters:   10/09/19 161 lb (73 kg)   09/19/19 171 lb (77.6 kg)   09/13/19 174 lb 4 oz (79 kg)       General Appearance:     Alert, cooperative and in no acute distress.   ENT/Mouth: membranes moist, no oral lesions or bleeding gums.      EYES:  no scleral icterus, normal conjunctivae   Chest/Lungs:   lungs are clear to auscultation, no rales or wheezing, respirations unlabored   Cardiovascular:   Regular. Normal first and second heart sounds, trace edema bilateral lower extremities    Abdomen:  Soft, nontender, nondistended, bowel sounds present   Extremities: no cyanosis or clubbing   Skin: warm, dry.    Neurologic: mood and affect are appropriate, alert and oriented x3      General: WNL  Eyes: WNL  Ears/Nose/Throat: WNL  Lungs: WNL  Heart: Shortness of Breath with activity  Stomach: Constipation  Bladder: Frequent Urination at Night  Muscle/Joints: WNL  Skin: WNL  Nervous System: WNL  Mental Health: WNL     Blood: WNL     Medical History  Surgical History Family History Social History   Past Medical History:   Diagnosis Date   ? Actinic keratosis    ? Acute posthemorrhagic anemia    ? Anemia    ? Anxiety disorder  due to general medical condition    ? Atrial flutter (H)    ? CAD (coronary artery disease)    ? Cardiomyopathy (H)    ? CHF (congestive heart failure) (H)    ? Chronic reflux esophagitis    ? Chronic systolic heart failure (H)     Created by [facility] Annotation: Mar 18 2013  1:29PM - Dave Segundo: diastolic  Replacement Utility updated for latest IMO load   ? Constipation    ? Decubitus ulcer    ? Fatigue    ? Fatigue    ? Foot pain    ? Hemorrhoids    ? HTN (hypertension)    ? Insomnia    ? Lipoma     adipose tissue   ? Major depression, chronic    ? 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 Pleural effusion    ? Polyuria    ? Slurred speech     few months after open heart   ? SSS (sick sinus syndrome) (H)    ? Transient global amnesia    ? Vasovagal syncope     Past Surgical History:   Procedure Laterality Date   ? CARDIAC PACEMAKER PLACEMENT     ? IR EXTREMITY VENOGRAM LEFT  9/23/2019   ? VT ABLATE HEART DYSRHYTHM FOCUS      Description: Catheter Ablation Atrial Fibrillation;  Recorded: 02/05/2014;  Comments: PVI Aug 2009: (PVI-RF + roof line)   ? VT CABG, VEIN, SINGLE      Description: CABG (CABG);  Proc Date: 02/07/2013;  Comments: Dr. Jatinder Simpson at [facility] ; Feb 2013: CAB X 3 (LIMA to LAD, SVG to OM, SVG to PDA); ; Reopened same day for RA bleeding   ? VT REMOVAL OF TONSILS,<13 Y/O      Description: Tonsillectomy;  Recorded: 03/26/2013;   ? TOTAL HIP ARTHROPLASTY      bilateral    Family History   Problem Relation Age of Onset   ? Heart attack Father 76    Social History     Socioeconomic History   ? Marital status:      Spouse name: Not on file   ? Number of children: Not on file   ? Years of education: Not on file   ? Highest education level: Not on file   Occupational History   ? Not on file   Social Needs   ? Financial resource strain: Not on file   ? Food insecurity:     Worry: Not on file     Inability: Not on file   ? Transportation needs:     Medical: Not on file      Non-medical: Not on file   Tobacco Use   ? Smoking status: Never Smoker   ? Smokeless tobacco: Never Used   Substance and Sexual Activity   ? Alcohol use: Yes     Alcohol/week: 1.0 standard drinks     Types: 1 Cans of beer per week   ? Drug use: No   ? Sexual activity: Not on file   Lifestyle   ? Physical activity:     Days per week: Not on file     Minutes per session: Not on file   ? Stress: Not on file   Relationships   ? Social connections:     Talks on phone: Not on file     Gets together: Not on file     Attends Yarsani service: Not on file     Active member of club or organization: Not on file     Attends meetings of clubs or organizations: Not on file     Relationship status: Not on file   ? Intimate partner violence:     Fear of current or ex partner: Not on file     Emotionally abused: Not on file     Physically abused: Not on file     Forced sexual activity: Not on file   Other Topics Concern   ? Not on file   Social History Narrative   ? Not on file          Medications  Allergies   Current Outpatient Medications   Medication Sig Dispense Refill   ? ELIQUIS 5 mg Tab tablet TAKE 1 TABLET BY MOUTH TWICE DAILY 180 tablet 1   ? furosemide (LASIX) 20 MG tablet Take 2 tablets (40 mg total) by mouth 2 (two) times a day at 9am and 6pm. 360 tablet 3   ? gabapentin (NEURONTIN) 300 MG capsule TAKE 1 CAPSULE BY MOUTH 3 TIMES PER DAY.  MAY TAKE 2 TO 3 CAPSULES BY MOUTH DAILY, DOES NOT NEED TO BE 3 SEPERATE TIMES.  (Patient taking differently: Taking 1 am and 3 pm      ) 270 capsule 3   ? MELATONIN ORAL Take by mouth.     ? metoprolol succinate (TOPROL-XL) 25 MG TAKE 2 TABLETS BY MOUTH DAILY 180 tablet 2   ? mirtazapine (REMERON) 7.5 MG tablet TAKE 1 TO 3 TABLETS BY MOUTH AT BEDTIME. 270 tablet 3   ? valACYclovir (VALTREX) 500 MG tablet 500 mg daily.        No current facility-administered medications for this visit.     Allergies   Allergen Reactions   ? Zolpidem Other (See Comments)     Hallucinations needed to be  restrained in hospital   ? Beta-Blockers (Beta-Adrenergic Blocking Agts) Other (See Comments)     Fatigue if metoprolol > 12.5 mg   ? Diphenhydramine Hcl Other (See Comments)     tachycardia         Lab Results    Chemistry/lipid CBC Cardiac Enzymes/BNP/TSH/INR   Lab Results   Component Value Date    CHOL 143 06/14/2019    HDL 45 06/14/2019    LDLCALC 88 06/14/2019    TRIG 52 06/14/2019    CREATININE 0.80 09/19/2019    BUN 19 09/19/2019    K 4.1 09/19/2019     (L) 09/19/2019    CL 93 (L) 09/19/2019    CO2 32 (H) 09/19/2019    Lab Results   Component Value Date    WBC 5.5 06/14/2019    HGB 12.6 (L) 06/14/2019    HCT 37.4 (L) 06/14/2019    MCV 93 06/14/2019     06/14/2019    Lab Results   Component Value Date    CKTOTAL 158 02/17/2013    CKMB 5 05/27/2013    TROPONINI 0.02 05/27/2013    BNP 1,075 (H) 09/11/2019    TSH 2.07 09/21/2017    INR 1.10 10/26/2017

## 2021-06-29 NOTE — PROGRESS NOTES
Progress Notes by Kelli Mathias CNP at 11/10/2020 10:30 AM     Author: Kelli Mathias CNP Service: -- Author Type: Nurse Practitioner    Filed: 11/10/2020 11:45 AM Encounter Date: 11/10/2020 Status: Signed    : Kelli Mathias CNP (Nurse Practitioner)             Assessment/Recommendations   Assessment:    1.  Heart failure with preserved ejection fraction: He has no symptoms of acute fluid retention.      2.  Shortness of breath: Shortness of breath started about 4 days ago.  His oxygen has been checked a few times at home and is as low as 83% with activity.  He recovers quickly with oxygen returning into mid 90s and shortness of breath resolves.  With walking into clinic today, his oxygen was 81% when checked by medical assistant.  It was monitored throughout his appointment and remained 91-98% at rest. He denied shortness of breath at rest.        Plan:  1.  No symptoms of acute heart failure  2.  I am concerned about his oxygen decreasing with activity.  Since he recovers quickly and only notes mild shortness of breath with activity, I do not think he needs emergency care at this time.  I have set up an appointment with primary care provider today for further evaluation.  He was encouraged to limit activity at this time.    His daughter is with him today at the visit and is in agreement with this plan. I recommended evaluation in ED if shortness of breath does not resolve with rest or if oxygen saturation remains in 80s at rest.      History of Present Illness/Subjective    Mr. Frank Robles is a 86 y.o. male seen at [facility] today for follow up.  He has a history of heart failure with preserved ejection fraction, hypertension, persistent atrial flutter, and CRT-P.  Echocardiogram on September 23, 2020 showed EF of 51%, mild mitral regurgitation, and moderate tricuspid regurgitation.     He comes into clinic today with increased shortness of breath  with activity.  Shortness of breath started about 4 days ago.  His oxygen has been checked a few times at home and is as low as 83% with activity.  He recovers quickly with oxygen returning into mid 90s and shortness of breath resolves.  With walking into clinic today, his oxygen was 81% when checked by medical assistant.  It was monitored throughout his appointment and remained 91-98% at rest. He denies shortness of breath at rest.  He notes some shortness of breath walking into clinic but overall, he states that he feels well.   He denies fatigue, lightheadedness, orthopnea, palpitations, chest pain and lower extremity edema.      His home weight has been stable between 148-149 pounds for the past 2 months.       ECHO:   Results for orders placed during the hospital encounter of 09/23/20   Echo Complete [ECH10] 09/23/2020    Narrative   Left ventricle ejection fraction is mildly decreased. The calculated   left ventricular ejection fraction is 51%.    Normal left ventricular cavity size. Mild concentric hypertrophy.    The right ventricle is moderately dilated. The systolic function is   moderately reduced.    Severe biatrial enlargement.    Mild mitral regurgitation.    Moderate tricuspid valve regurgitation.    Moderate pulmonary hypertension present. The estimated systolic   pulmonary artery pressure is 57 mm Hg.    When compared to the previous study dated 9/13/2019, the findings are   similar.           Physical Examination Review of Systems   Vitals:    11/10/20 1108   BP:    Pulse: 65   Resp:    SpO2: 96%     Body mass index is 22.74 kg/m .  Wt Readings from Last 3 Encounters:   11/10/20 154 lb (69.9 kg)   09/24/20 155 lb 3.2 oz (70.4 kg)   09/23/20 153 lb (69.4 kg)       General Appearance:     Alert, cooperative and in no acute distress.   ENT/Mouth: membranes moist, no oral lesions or bleeding gums.      EYES:  no scleral icterus, normal conjunctivae   Chest/Lungs:   lungs are clear to auscultation, no  crackles   Cardiovascular:   Regular. Normal first and second heart sounds, no edema bilateral lower extremities    Abdomen:  Soft, nontender, nondistended, bowel sounds present   Extremities: no cyanosis or clubbing   Skin: warm, dry.    Neurologic: mood and affect are appropriate, alert and oriented x3      General: Weight Loss  Eyes: WNL  Ears/Nose/Throat: WNL  Lungs: Shortness of Breath  Heart: Shortness of Breath with activity  Stomach: Constipation  Bladder: Frequent Urination at Night  Muscle/Joints: Muscle Weakness  Skin: WNL  Nervous System: Daytime Sleepiness, Loss of Balance  Mental Health: Confusion     Blood: Easy Bruising     Medical History  Surgical History Family History Social History   Past Medical History:   Diagnosis Date   ? Actinic keratosis    ? Acute posthemorrhagic anemia    ? Anemia    ? Anxiety disorder due to general medical condition    ? Atrial fibrillation (H)    ? Atrial flutter (H)    ? Biventricular cardiac pacemaker in situ     Previous right-sided implant  Medtronic [id number] Advisa DR CARBAJAL DOI: 9/8/2014 Upgrade to CRT-P: Dec 2, 2019   ? CAD (coronary artery disease)    ? Cardiomyopathy (H)    ? CHF (congestive heart failure) (H)    ? Chronic reflux esophagitis    ? Chronic systolic heart failure (H)     Created by [facility] Annotation: Mar 18 2013  1:29PM - Dave Segundo: diastolic  Replacement Utility updated for latest IMO load   ? Constipation    ? Decubitus ulcer    ? Fatigue    ? Fatigue    ? Foot pain    ? Hemorrhoids    ? HTN (hypertension)    ? Insomnia    ? Lipoma     adipose tissue   ? Major depression, chronic    ? 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 Pleural effusion    ? Polyuria    ? Slurred speech     few months after open heart   ? SSS (sick sinus syndrome) (H)    ? Stroke (H)    ? Third degree AV block (H) 12/2/2019   ? Transient global amnesia    ? Vasovagal syncope     Past Surgical History:   Procedure Laterality Date   ? CARDIAC PACEMAKER  PLACEMENT     ? IR EXTREMITY VENOGRAM LEFT  9/23/2019   ? IA ABLATE HEART DYSRHYTHM FOCUS      Description: Catheter Ablation Atrial Fibrillation;  Recorded: 02/05/2014;  Comments: PVI Aug 2009: (PVI-RF + roof line)   ? IA CABG, VEIN, SINGLE      Description: CABG (CABG);  Proc Date: 02/07/2013;  Comments: Dr. Jatinder Simpson at [facility] ; Feb 2013: CAB X 3 (LIMA to LAD, SVG to OM, SVG to PDA); ; Reopened same day for RA bleeding   ? IA REMOVAL OF TONSILS,<11 Y/O      Description: Tonsillectomy;  Recorded: 03/26/2013;   ? TOTAL HIP ARTHROPLASTY      bilateral    Family History   Problem Relation Age of Onset   ? Heart attack Father 76    Social History     Socioeconomic History   ? Marital status:      Spouse name: Not on file   ? Number of children: Not on file   ? Years of education: Not on file   ? Highest education level: Not on file   Occupational History   ? Not on file   Social Needs   ? Financial resource strain: Not on file   ? Food insecurity     Worry: Not on file     Inability: Not on file   ? Transportation needs     Medical: Not on file     Non-medical: Not on file   Tobacco Use   ? Smoking status: Never Smoker   ? Smokeless tobacco: Never Used   Substance and Sexual Activity   ? Alcohol use: Not Currently     Alcohol/week: 1.0 standard drinks     Types: 1 Cans of beer per week   ? Drug use: No   ? Sexual activity: Not on file   Lifestyle   ? Physical activity     Days per week: Not on file     Minutes per session: Not on file   ? Stress: Not on file   Relationships   ? Social connections     Talks on phone: Not on file     Gets together: Not on file     Attends Anglican service: Not on file     Active member of club or organization: Not on file     Attends meetings of clubs or organizations: Not on file     Relationship status: Not on file   ? Intimate partner violence     Fear of current or ex partner: Not on file     Emotionally abused: Not on file     Physically abused: Not on file      Forced sexual activity: Not on file   Other Topics Concern   ? Not on file   Social History Narrative   ? Not on file          Medications  Allergies   Current Outpatient Medications   Medication Sig Dispense Refill   ? ELIQUIS 5 mg Tab tablet TAKE 1 TABLET (5 mg) BY MOUTH 2 times a day 180 tablet 0   ? gabapentin (NEURONTIN) 300 MG capsule Take 3 capsules (900 mg total) by mouth at bedtime.     ? gabapentin (NEURONTIN) 300 MG capsule Take 300 mg by mouth daily. morning     ? MELATONIN ORAL Take 3 mg by mouth at bedtime. Takes 1/4 tablet     ? metoprolol succinate (TOPROL-XL) 25 MG TAKE 2 TABLETS BY MOUTH once DAILY 180 tablet 0   ? mirtazapine (REMERON) 7.5 MG tablet TAKE 1 TO 3 TABLETS BY MOUTH nightly AT BEDTIME 270 tablet 3   ? polyethylene glycol 3350 (MIRALAX ORAL) Take by mouth. 1/2 a dose daily     ? potassium chloride (KLOR-CON) 10 MEQ CR tablet TAKE 1 TABLET BY MOUTH 2 TIMES PER DAY  180 tablet 0   ? torsemide (DEMADEX) 100 MG tablet Take 50 mg by mouth daily.       No current facility-administered medications for this visit.     Allergies   Allergen Reactions   ? Zolpidem Other (See Comments)     Hallucinations needed to be restrained in hospital   ? Beta-Blockers (Beta-Adrenergic Blocking Agts) Other (See Comments)     Fatigue if metoprolol > 12.5 mg   ? Diphenhydramine Hcl Other (See Comments)     tachycardia         Lab Results    Chemistry/lipid CBC Cardiac Enzymes/BNP/TSH/INR   Lab Results   Component Value Date    CHOL 143 06/14/2019    HDL 45 06/14/2019    LDLCALC 88 06/14/2019    TRIG 52 06/14/2019    CREATININE 0.88 09/14/2020    BUN 38 (H) 09/14/2020    K 4.2 09/14/2020     09/14/2020    CL 95 (L) 09/14/2020    CO2 33 (H) 09/14/2020    Lab Results   Component Value Date    WBC 6.0 09/14/2020    HGB 12.3 (L) 09/14/2020    HCT 39.0 (L) 09/14/2020    MCV 90 09/14/2020     09/14/2020    Lab Results   Component Value Date    CKTOTAL 158 02/17/2013    CKMB 5 05/27/2013    TROPONINI 0.02  05/27/2013    BNP 1,075 (H) 09/11/2019    TSH 2.07 09/21/2017    INR 1.10 10/26/2017

## 2021-06-29 NOTE — PROGRESS NOTES
Progress Notes by David Hartman MD at 9/24/2020  2:40 PM     Author: David Hartman MD Service: -- Author Type: Physician    Filed: 9/24/2020  3:11 PM Encounter Date: 9/24/2020 Status: Signed    : David Hartman MD (Physician)               Assessment/Recommendations   Patient with known coronary artery disease, atrial dysrhythmias, heart failure with preserved ejection fraction, right ventricular reduction and function and biventricular pacing done on an upgraded at the end of 2019.  He is doing well from a functional capacity standpoint.  He continues to get brief runs of ventricular dysrhythmias on his device checks but he is asymptomatic.  He continues to walk on a regular basis and I have encouraged him to continue this.    I contemplated increasing his beta-blocker but because he is doing well I did not want to risk giving him side effects from increased dosing of his medications.  He is in agreement.    We will have him seen in follow-up as part of our heart failure clinic in about 3 months.  I will plan on seeing him in 1 year.    Thank you for allowing us to participate in his care.       History of Present Illness/Subjective    Mr. Frank Robles is a 86 y.o. male with known coronary artery disease, heart failure with preserved ejection fraction with ejection fraction measured yesterday at 51%, history of pacemaker implantation with upgrade to a biventricular pacing in December 2019.    The patient is feeling well.  He denies shortness of breath, chest discomfort, orthopnea, paroxysmal nocturnal dyspnea, peripheral edema, syncope or near syncopal episodes.  He walks in long hallways at his complex and in good weather walks outside.  He feels like his functional capacity is stable.    His device check shows short runs of ventricular dysrhythmias but with normal left ventricular ejection fraction our electrophysiology colleagues have not recommended any specific therapy.    Sep 23, 2020    CARDIAC TESTING [[id number]]    Patient Information     Patient Name   Frank Robles  MRN   [id number]  Sex   Male   1   1934 (86 y.o.)    Indications     Dx: Paroxysmal ventricular tachycardia (H) [I47.2 (ICD-10-CM)]    Summary       Left ventricle ejection fraction is mildly decreased. The calculated left ventricular ejection fraction is 51%.    Normal left ventricular cavity size. Mild concentric hypertrophy.    The right ventricle is moderately dilated. The systolic function is moderately reduced.    Severe biatrial enlargement.    Mild mitral regurgitation.    Moderate tricuspid valve regurgitation.    Moderate pulmonary hypertension present. The estimated systolic pulmonary artery pressure is 57 mm Hg.    When compared to the previous study dated 2019, the findings are similar.            Physical Examination Review of Systems   Vitals:    20 1435   BP: 110/60   Pulse: 65   SpO2: 94%     Body mass index is 22.92 kg/m .  Wt Readings from Last 3 Encounters:   20 155 lb 3.2 oz (70.4 kg)   20 153 lb (69.4 kg)   20 153 lb (69.4 kg)     General Appearance:   Alert, cooperative and in no acute distress.   ENT/Mouth: Oral mucuos membranes pink and moist .      EYES:  no scleral icterus, normal conjunctivae   Neck: JVP normal. No Hepatojugular reflux. Thyroid not visualized.   Chest/Lungs:   Lungs are clear to auscultation, equal chest wall expansion.   Cardiovascular:   S1, S2 with 2/6 systolic murmur , no clicks or rubs. Brachial, radial  pulses are intact and symetric. No carotid bruits noted   Abdomen:  Nontender. BS+. No bruits.   Extremities: No cyanosis, clubbing or edema   Skin: no xanthelasma, warm.    Neurologic: normal arm movement bilateral, no tremors     Psychiatric: Appropriate affect.      General: WNL  Eyes: WNL  Ears/Nose/Throat: WNL  Lungs: WNL  Heart: WNL  Stomach: WNL  Bladder: WNL  Muscle/Joints: WNL  Skin: WNL  Nervous System: WNL  Mental Health: WNL      Blood: WNL       Medical History  Surgical History Family History Social History   Past Medical History:   Diagnosis Date   ? Actinic keratosis    ? Acute posthemorrhagic anemia    ? Anemia    ? Anxiety disorder due to general medical condition    ? Atrial fibrillation (H)    ? Atrial flutter (H)    ? Biventricular cardiac pacemaker in situ     Previous right-sided implant  Medtronic [id number] Advisa DR CARBAJAL DOI: 9/8/2014 Upgrade to CRT-P: Dec 2, 2019   ? CAD (coronary artery disease)    ? Cardiomyopathy (H)    ? CHF (congestive heart failure) (H)    ? Chronic reflux esophagitis    ? Chronic systolic heart failure (H)     Created by [facility] Annotation: Mar 18 2013  1:29PM - Dave Segundo: diastolic  Replacement Utility updated for latest IMO load   ? Constipation    ? Decubitus ulcer    ? Fatigue    ? Fatigue    ? Foot pain    ? Hemorrhoids    ? HTN (hypertension)    ? Insomnia    ? Lipoma     adipose tissue   ? Major depression, chronic    ? 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 Pleural effusion    ? Polyuria    ? Slurred speech     few months after open heart   ? SSS (sick sinus syndrome) (H)    ? Stroke (H)    ? Third degree AV block (H) 12/2/2019   ? Transient global amnesia    ? Vasovagal syncope     Past Surgical History:   Procedure Laterality Date   ? CARDIAC PACEMAKER PLACEMENT     ? IR EXTREMITY VENOGRAM LEFT  9/23/2019   ? CO ABLATE HEART DYSRHYTHM FOCUS      Description: Catheter Ablation Atrial Fibrillation;  Recorded: 02/05/2014;  Comments: PVI Aug 2009: (PVI-RF + roof line)   ? CO CABG, VEIN, SINGLE      Description: CABG (CABG);  Proc Date: 02/07/2013;  Comments: Dr. Jatinder Simpson at [facility] ; Feb 2013: CAB X 3 (LIMA to LAD, SVG to OM, SVG to PDA); ; Reopened same day for RA bleeding   ? CO REMOVAL OF TONSILS,<13 Y/O      Description: Tonsillectomy;  Recorded: 03/26/2013;   ? TOTAL HIP ARTHROPLASTY      bilateral    Family History   Problem Relation Age of Onset   ? Heart attack  Father 76    Social History     Socioeconomic History   ? Marital status:      Spouse name: Not on file   ? Number of children: Not on file   ? Years of education: Not on file   ? Highest education level: Not on file   Occupational History   ? Not on file   Social Needs   ? Financial resource strain: Not on file   ? Food insecurity     Worry: Not on file     Inability: Not on file   ? Transportation needs     Medical: Not on file     Non-medical: Not on file   Tobacco Use   ? Smoking status: Never Smoker   ? Smokeless tobacco: Never Used   Substance and Sexual Activity   ? Alcohol use: Not Currently     Alcohol/week: 1.0 standard drinks     Types: 1 Cans of beer per week   ? Drug use: No   ? Sexual activity: Not on file   Lifestyle   ? Physical activity     Days per week: Not on file     Minutes per session: Not on file   ? Stress: Not on file   Relationships   ? Social connections     Talks on phone: Not on file     Gets together: Not on file     Attends Shinto service: Not on file     Active member of club or organization: Not on file     Attends meetings of clubs or organizations: Not on file     Relationship status: Not on file   ? Intimate partner violence     Fear of current or ex partner: Not on file     Emotionally abused: Not on file     Physically abused: Not on file     Forced sexual activity: Not on file   Other Topics Concern   ? Not on file   Social History Narrative   ? Not on file          Medications  Allergies   Current Outpatient Medications   Medication Sig Dispense Refill   ? ELIQUIS 5 mg Tab tablet TAKE 1 TABLET (5 mg) BY MOUTH 2 times a day 180 tablet 0   ? furosemide (LASIX) 20 MG tablet Take 20 mg by mouth 2 (two) times a day.     ? gabapentin (NEURONTIN) 300 MG capsule Take 3 capsules (900 mg total) by mouth at bedtime.     ? MELATONIN ORAL Take 3 mg by mouth at bedtime. Takes 1/4 tablet     ? metoprolol succinate (TOPROL-XL) 25 MG TAKE 2 TABLETS BY MOUTH once DAILY 180 tablet 0    ? mirtazapine (REMERON) 7.5 MG tablet TAKE 1 TO 3 TABLETS BY MOUTH nightly AT BEDTIME 270 tablet 0   ? polyethylene glycol 3350 (MIRALAX ORAL) Take by mouth. 1/2 a dose daily     ? potassium chloride (KLOR-CON) 10 MEQ CR tablet TAKE 1 TABLET BY MOUTH 2 TIMES PER DAY  180 tablet 0     No current facility-administered medications for this visit.     Allergies   Allergen Reactions   ? Zolpidem Other (See Comments)     Hallucinations needed to be restrained in hospital   ? Beta-Blockers (Beta-Adrenergic Blocking Agts) Other (See Comments)     Fatigue if metoprolol > 12.5 mg   ? Diphenhydramine Hcl Other (See Comments)     tachycardia         Lab Results    Chemistry/lipid CBC Cardiac Enzymes/BNP/TSH/INR   Lab Results   Component Value Date    CHOL 143 06/14/2019    HDL 45 06/14/2019    LDLCALC 88 06/14/2019    TRIG 52 06/14/2019    CREATININE 0.88 09/14/2020    BUN 38 (H) 09/14/2020    K 4.2 09/14/2020     09/14/2020    CL 95 (L) 09/14/2020    CO2 33 (H) 09/14/2020    Lab Results   Component Value Date    WBC 6.0 09/14/2020    HGB 12.3 (L) 09/14/2020    HCT 39.0 (L) 09/14/2020    MCV 90 09/14/2020     09/14/2020    Lab Results   Component Value Date    CKTOTAL 158 02/17/2013    CKMB 5 05/27/2013    TROPONINI 0.02 05/27/2013    BNP 1,075 (H) 09/11/2019    TSH 2.07 09/21/2017    INR 1.10 10/26/2017

## 2021-06-30 NOTE — PROGRESS NOTES
Progress Notes by Kelli Mathias CNP at 12/2/2020  2:10 PM     Author: Kelli Mathias CNP Service: -- Author Type: Nurse Practitioner    Filed: 12/2/2020  3:43 PM Encounter Date: 12/2/2020 Status: Signed    : Kelli Mathias CNP (Nurse Practitioner)             Assessment/Recommendations   Assessment:    1. Heart failure with preserved ejection fraction:  His weight has increased about 10-12 pounds over the past 2 weeks. His weight has not decreased with increasing torsemide dose. He has shortness of breath with activity. Oxygen requirements are up from 1L to now 2L at rest and 3L with activity. I recommended talking with Dr. Maier about oxygen orders.  He was previously told to go to the ED if he needed more than 4L.  Bernard completed his POLST and is now declining any hospitalization. I recommended not exceeding 5L.     2. Possible emphysema: He has an appointment with pulmonology on January 27.  His daughter will try to move this appointment up.    Plan:  1.  BMP and BNP pending  2. If renal function is stable, he may benefit from a dose of metolazone.   3. I will update Dr. Hartman.          History of Present Illness/Subjective    Mr. Frank Robles is a 86 y.o. male seen at [facility] today for continued follow-up.  He has a history of heart failure with preserved ejection fraction, atrial fibrillation with ablation in 2014, pulmonary hypertension, emphysema, and CRT-P.  He was hospitalized November 10 to November 15, 2020 with hypoxia related to mild heart failure exacerbation and pulmonary hypertension.  He started home oxygen.  Echocardiogram on September 23, 2020 showed ejection fraction of 51%.  Stress test on November 13, 2020 was negative for ischemia or infarction.    Bernard called into clinic last week with weight gain.  Torsemide was increased for 3 days with improvement.  However, when he went back to previous dose, he regained fluid.   Torsemide was increased back to 100 mg daily 3 days ago.  His weight has stayed the same with no loss.  Home weight is now about 156 pounds.    His daughter reports an episode of choking this morning.  His oxygen dropped to 30% briefly and 911 was called.  His oxygen returned to 90s within a few minutes.  She spoke with his primary care provider about this episode this morning.  He was prescribed an antibiotic.     Bernard continues to have shortness of breath with activity.  He is now using 2 L of oxygen at all times and occasionally will increase to 3 L with activity.  His weight remains elevated.  He has minimal lower extremity edema. He is now wearing light compression stockings.  He denies lightheadedness, chest pain and abdominal fullness/bloating.        ECHO:   Results for orders placed during the hospital encounter of 09/23/20   Echo Complete [ECH10] 09/23/2020    Narrative   Left ventricle ejection fraction is mildly decreased. The calculated   left ventricular ejection fraction is 51%.    Normal left ventricular cavity size. Mild concentric hypertrophy.    The right ventricle is moderately dilated. The systolic function is   moderately reduced.    Severe biatrial enlargement.    Mild mitral regurgitation.    Moderate tricuspid valve regurgitation.    Moderate pulmonary hypertension present. The estimated systolic   pulmonary artery pressure is 57 mm Hg.    When compared to the previous study dated 9/13/2019, the findings are   similar.           Physical Examination Review of Systems   Vitals:    12/02/20 1425   BP: 110/58   Pulse: 74   Resp: 16     Body mass index is 23.18 kg/m .  Wt Readings from Last 3 Encounters:   12/02/20 157 lb (71.2 kg)   11/18/20 149 lb 9.6 oz (67.9 kg)   11/15/20 145 lb 8 oz (66 kg)       General Appearance:     Alert, cooperative and in no acute distress.   ENT/Mouth: membranes moist, no oral lesions or bleeding gums.      EYES:  no scleral icterus, normal conjunctivae    Chest/Lungs:   Crackles in left lower lobe, shallow breathing   Cardiovascular:   Regular. Normal first and second heart sounds, trace edema bilateral lower extremities    Abdomen:  Soft, nontender, nondistended, bowel sounds present   Extremities: no cyanosis or clubbing   Skin: warm, dry.    Neurologic: mood and affect are appropriate, alert and oriented x3                                                Medical History  Surgical History Family History Social History   Past Medical History:   Diagnosis Date   ? Actinic keratosis    ? Acute posthemorrhagic anemia    ? Anemia    ? Anxiety disorder due to general medical condition    ? Atrial fibrillation (H)    ? Atrial flutter (H)    ? Biventricular cardiac pacemaker in situ     Previous right-sided implant  Medtronic [id number] Advisa DR CARBAJAL DOI: 9/8/2014 Upgrade to CRT-P: Dec 2, 2019   ? CAD (coronary artery disease)    ? Cardiomyopathy (H)    ? CHF (congestive heart failure) (H)    ? Chronic reflux esophagitis    ? Chronic systolic heart failure (H)     Created by [facility] Annotation: Mar 18 2013  1:29PM - Dave Segundo: diastolic  Replacement Utility updated for latest IMO load   ? Constipation    ? Decubitus ulcer    ? Fatigue    ? Fatigue    ? Foot pain    ? Hemorrhoids    ? HTN (hypertension)    ? Insomnia    ? Lipoma     adipose tissue   ? Major depression, chronic    ? 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 Pleural effusion    ? Polyuria    ? Slurred speech     few months after open heart   ? SSS (sick sinus syndrome) (H)    ? Stroke (H)    ? Third degree AV block (H) 12/2/2019   ? Transient global amnesia    ? Vasovagal syncope     Past Surgical History:   Procedure Laterality Date   ? CARDIAC PACEMAKER PLACEMENT     ? IR EXTREMITY VENOGRAM LEFT  9/23/2019   ? MD ABLATE HEART DYSRHYTHM FOCUS      Description: Catheter Ablation Atrial Fibrillation;  Recorded: 02/05/2014;  Comments: PVI Aug 2009: (PVI-RF + roof line)   ? MD CABG, VEIN, SINGLE       Description: CABG (CABG);  Proc Date: 02/07/2013;  Comments: Dr. Jatinder Simpson at [facility] ; Feb 2013: CAB X 3 (LIMA to LAD, SVG to OM, SVG to PDA); ; Reopened same day for RA bleeding   ? MD REMOVAL OF TONSILS,<13 Y/O      Description: Tonsillectomy;  Recorded: 03/26/2013;   ? TOTAL HIP ARTHROPLASTY      bilateral    Family History   Problem Relation Age of Onset   ? Heart attack Father 76    Social History     Socioeconomic History   ? Marital status:      Spouse name: Not on file   ? Number of children: Not on file   ? Years of education: Not on file   ? Highest education level: Not on file   Occupational History   ? Not on file   Social Needs   ? Financial resource strain: Not on file   ? Food insecurity     Worry: Not on file     Inability: Not on file   ? Transportation needs     Medical: Not on file     Non-medical: Not on file   Tobacco Use   ? Smoking status: Never Smoker   ? Smokeless tobacco: Never Used   Substance and Sexual Activity   ? Alcohol use: Not Currently     Alcohol/week: 1.0 standard drinks     Types: 1 Cans of beer per week   ? Drug use: No   ? Sexual activity: Not on file   Lifestyle   ? Physical activity     Days per week: Not on file     Minutes per session: Not on file   ? Stress: Not on file   Relationships   ? Social connections     Talks on phone: Not on file     Gets together: Not on file     Attends [employer] service: Not on file     Active member of club or organization: Not on file     Attends meetings of clubs or organizations: Not on file     Relationship status: Not on file   ? Intimate partner violence     Fear of current or ex partner: Not on file     Emotionally abused: Not on file     Physically abused: Not on file     Forced sexual activity: Not on file   Other Topics Concern   ? Not on file   Social History Narrative   ? Not on file          Medications  Allergies   Current Outpatient Medications   Medication Sig Dispense Refill   ? amoxicillin-clavulanate  (AUGMENTIN) 875-125 mg per tablet Take 1 tablet by mouth 2 (two) times a day for 7 days. 14 tablet 0   ? ELIQUIS 5 mg Tab tablet TAKE 1 TABLET (5 mg) BY MOUTH 2 times a day 180 tablet 0   ? gabapentin (NEURONTIN) 300 MG capsule Take 3 capsules (900 mg total) by mouth at bedtime.  0   ? magnesium chloride (SLOW-MAG) 64 mg TbEC delayed-release tablet Take 1 tablet (64 mg total) by mouth daily.  0   ? melatonin 1 mg Tab tablet Take 1 mg by mouth daily with supper.      ? metoprolol succinate (TOPROL-XL) 25 MG TAKE 2 TABLETS BY MOUTH once DAILY 180 tablet 0   ? mirtazapine (REMERON) 7.5 MG tablet Take 2 tablets (15 mg total) by mouth at bedtime for 7 days, THEN 1 tablet (7.5 mg total) at bedtime for 7 days. 21 tablet 0   ? polyethylene glycol 3350 (MIRALAX ORAL) Take 8.5 g by mouth daily.      ? potassium chloride (KLOR-CON) 10 MEQ CR tablet TAKE 1 TABLET BY MOUTH 2 TIMES PER DAY  180 tablet 1   ? torsemide (DEMADEX) 100 MG tablet Take 100 mg by mouth daily.      ? valACYclovir (VALTREX) 500 MG tablet Take 500 mg by mouth daily.       No current facility-administered medications for this visit.     Allergies   Allergen Reactions   ? Zolpidem Other (See Comments)     Hallucinations needed to be restrained in hospital   ? Beta-Blockers (Beta-Adrenergic Blocking Agts) Other (See Comments)     Fatigue if metoprolol > 12.5 mg   ? Diphenhydramine Hcl Other (See Comments)     tachycardia         Lab Results    Chemistry/lipid CBC Cardiac Enzymes/BNP/TSH/INR   Lab Results   Component Value Date    CHOL 143 06/14/2019    HDL 45 06/14/2019    LDLCALC 88 06/14/2019    TRIG 52 06/14/2019    CREATININE 0.95 11/14/2020    BUN 23 11/14/2020    K 3.5 11/15/2020     11/14/2020    CL 98 11/14/2020    CO2 34 (H) 11/14/2020    Lab Results   Component Value Date    WBC 5.7 11/14/2020    HGB 11.4 (L) 11/14/2020    HCT 36.9 (L) 11/14/2020    MCV 90 11/14/2020     11/14/2020    Lab Results   Component Value Date    CKTOTAL 70  11/10/2020    CKMB 5 05/27/2013    TROPONINI 0.04 11/11/2020     (H) 11/10/2020    TSH 2.07 09/21/2017    INR 1.49 (H) 11/12/2020

## 2021-07-03 NOTE — ADDENDUM NOTE
Addendum Note by Marline Mckeon MD at 4/21/2020  3:39 PM     Author: Marline Mckeon MD Service: -- Author Type: Physician    Filed: 4/21/2020  3:39 PM Encounter Date: 4/21/2020 Status: Signed    : Marline Mckeon MD (Physician)    Addended by: MARLINE MCKEON on: 4/21/2020 03:39 PM        Modules accepted: Orders

## 2021-07-03 NOTE — ADDENDUM NOTE
Addendum Note by Eliz Chambers RN at 12/4/2020  2:37 PM     Author: Eliz Chambers RN Service: -- Author Type: Registered Nurse    Filed: 12/4/2020  2:37 PM Encounter Date: 12/4/2020 Status: Signed    : Eliz Chambers RN (Registered Nurse)    Addended by: ELIZ CHAMBERS on: 12/4/2020 02:37 PM        Modules accepted: Orders

## 2021-07-14 PROBLEM — I50.9 HEART FAILURE (H): Status: RESOLVED | Noted: 2019-10-10 | Resolved: 2019-12-09
